# Patient Record
Sex: FEMALE | Race: WHITE
[De-identification: names, ages, dates, MRNs, and addresses within clinical notes are randomized per-mention and may not be internally consistent; named-entity substitution may affect disease eponyms.]

---

## 2018-06-18 LAB
BUN BLD-MCNC: 13 MG/DL (ref 6–20)
GLUCOSE SERPLBLD-MCNC: 112 MG/DL (ref 65–120)
HCT VFR BLD CALC: 34.1 % (ref 36–45)
LYMPHOCYTES # SPEC AUTO: 1.1 K/UL (ref 0.7–4.9)
MCH RBC QN AUTO: 29.6 PG (ref 27–35)
MCV RBC: 87.8 FL (ref 80–100)
PMV BLD: 7.5 FL (ref 7.6–11.3)
POTASSIUM SERPL-SCNC: 3.8 MEQ/L (ref 3.6–5)
RBC # BLD: 3.89 M/UL (ref 3.86–4.86)

## 2018-06-18 NOTE — EKG
Test Date:    2018-06-18               Test Time:    11:52:44

Technician:   KENAN                                     

                                                     

MEASUREMENT RESULTS:                                       

Intervals:                                           

Rate:         85                                     

NM:           140                                    

QRSD:         82                                     

QT:           386                                    

QTc:          459                                    

Axis:                                                

P:            -7                                     

NM:           140                                    

QRS:          99                                     

T:            49                                     

                                                     

INTERPRETIVE STATEMENTS:                                       

                                                     

Normal sinus rhythm

Rightward axis

Anterior infarct, age undetermined

Abnormal ECG

No previous ECG available for comparison



Electronically Signed On 06-18-18 12:06:56 CDT by Clement Mayo

## 2018-06-19 ENCOUNTER — HOSPITAL ENCOUNTER (OUTPATIENT)
Dept: HOSPITAL 97 - OR | Age: 83
Discharge: HOME | End: 2018-06-19
Attending: ORTHOPAEDIC SURGERY
Payer: COMMERCIAL

## 2018-06-19 VITALS — TEMPERATURE: 97.2 F | DIASTOLIC BLOOD PRESSURE: 57 MMHG | SYSTOLIC BLOOD PRESSURE: 133 MMHG

## 2018-06-19 VITALS — OXYGEN SATURATION: 100 %

## 2018-06-19 DIAGNOSIS — I10: ICD-10-CM

## 2018-06-19 DIAGNOSIS — J44.9: ICD-10-CM

## 2018-06-19 DIAGNOSIS — E07.9: ICD-10-CM

## 2018-06-19 DIAGNOSIS — Z87.891: ICD-10-CM

## 2018-06-19 DIAGNOSIS — Z88.6: ICD-10-CM

## 2018-06-19 DIAGNOSIS — I25.10: ICD-10-CM

## 2018-06-19 DIAGNOSIS — K21.9: ICD-10-CM

## 2018-06-19 DIAGNOSIS — S52.502A: Primary | ICD-10-CM

## 2018-06-19 DIAGNOSIS — Z85.118: ICD-10-CM

## 2018-06-19 DIAGNOSIS — J45.909: ICD-10-CM

## 2018-06-19 PROCEDURE — 85025 COMPLETE CBC W/AUTO DIFF WBC: CPT

## 2018-06-19 PROCEDURE — 80048 BASIC METABOLIC PNL TOTAL CA: CPT

## 2018-06-19 PROCEDURE — 0PSJ04Z REPOSITION LEFT RADIUS WITH INTERNAL FIXATION DEVICE, OPEN APPROACH: ICD-10-PCS

## 2018-06-19 PROCEDURE — 93005 ELECTROCARDIOGRAM TRACING: CPT

## 2018-06-19 PROCEDURE — 25607 OPTX DST RD XARTC FX/EPI SEP: CPT

## 2018-06-19 PROCEDURE — 36415 COLL VENOUS BLD VENIPUNCTURE: CPT

## 2018-06-19 PROCEDURE — 73100 X-RAY EXAM OF WRIST: CPT

## 2018-06-19 RX ADMIN — MORPHINE SULFATE ONE MG: 4 INJECTION, SOLUTION INTRAMUSCULAR; INTRAVENOUS at 09:18

## 2018-06-19 RX ADMIN — MORPHINE SULFATE ONE MG: 4 INJECTION, SOLUTION INTRAMUSCULAR; INTRAVENOUS at 09:31

## 2018-06-19 RX ADMIN — MORPHINE SULFATE ONE MG: 4 INJECTION, SOLUTION INTRAMUSCULAR; INTRAVENOUS at 09:38

## 2018-06-19 RX ADMIN — MORPHINE SULFATE ONE MG: 4 INJECTION, SOLUTION INTRAMUSCULAR; INTRAVENOUS at 09:24

## 2018-06-19 NOTE — RAD REPORT
EXAM DESCRIPTION:  RAD - Wrist Left 2 View - 6/19/2018 10:11 am

 

FINDINGS:  Left wrist fluoroscopy performed. Six images were submitted for evaluation. Total dose 2.8
2 mGy. Fluoro time 3.1 minutes

 

Multiple portable C-arm views were obtained during fluoroscopic assisted placement of fracture fixati
on hardware. No suspicious or unexpected findings.

## 2018-06-19 NOTE — P.BOP
Preoperative diagnosis: left distal radius fracture highly displaced


Postoperative diagnosis: same


Primary procedure: left distal radius orif


Estimated blood loss: 10 ccs


Anesthesia: General


Transferred to: Recovery Room


Condition: Good

## 2018-06-19 NOTE — OP
Date of Procedure:  06/19/2018



Surgeon:  Jose Raul Jay MD



Preoperative Diagnosis:  Left distal radius, which is comminuted and highly displaced and also 3 week
s old.



Postoperative Diagnosis:  Left distal radius, which is comminuted and highly displaced and also 3 wee
ks old.



Procedure:  Left distal radius open reduction and internal fixation using Acumed 2 volar plating syst
em.



Estimated Blood Loss:  10 cc.



Complications:  There were no complications.



Specimens:  No pathology specimen sent.



Indication For Operation:  Ms. Wagner is an 83-year-old female, who unfortunately has an ipsilateral p
roximal humerus fracture, which is nondisplaced, who presented to my office around 3 weeks after init
ially being injured.  She has been placed in a sugar-tong splint.  She had a reduction; however, x-ra
ys demonstrated the reduction did not hold it at all, her distal radius is completely dissociated fro
m her radial shaft.  All risks, benefits, and alternatives to operative intervention were discussed w
ith the patient and family.  They know that her results may not be ideal; however, goal will be to re
establish the hand onto the forearm.  They state they understand everything as presented and wished t
o proceed.



Description Of Procedure:  The patient was taken to the operating room and placed in supine position.
  General anesthesia was obtained by the staff.  Following this, she was gently positioned because of
 her shoulder and a well-padded tourniquet was placed on superior left arm.  Her left upper extremity
 was then prepped and draped in usual sterile fashion for the procedure.  C-arm was brought in and at
tempts were made to pursue any sort of closed reduction maneuver.  This does not cause any movement o
f the fracture fragment and any sort of a positive direction, it is completely dissociated essentiall
y banded and extremely short.  The appearance of the wrist itself is highly really deviated and short
.  We cannot establish neutral position of the wrist in this position.  Following this, the incision 
line was marked out for a volar approach of Nazario.  The arm was then elevated, but not exsanguinated.
  The tourniquet was raised.  A standard volar approach of Nazario was then taken down carefully throug
h skin and soft tissues.  Meticulous hemostasis was being maintained using bipolar electrocautery.  T
his leads to the flexor carpi radialis, which was retracted radialward.  The sheath of the flexor car
pi radialis was then retracted ulnarward along with the median nerve and flexor pollicis longus.  The
 proximal portion of the radius was then easily encountered.  It has been smoothed down with callus f
ormation.  The pronator quadratus was then removed from the shaft.  A combination of x-ray and carefu
l dissection was then used to establish the distal portion.  The distal portion was then brought up. 
 It still was in extreme radial deviation and with dorsal angulation, there was also radial translati
on.  Some dissection was made in the region of the radioulnar joint to help ulnarly translate this.  
Attempts were made to reduce this and hold in place while plating it; however, this was not going to 
be possible.  Therefore, the decision was made to get it as reduced as possible and then apply the di
stal portion of the plate first.  This was done in a non-usual way as the radius could not be reduced
 while applying the plate.  Following this and after all pegs were applied, the radius was then broug
ht out to length and ulnarly deviated and ulnarly only translated as much as possible.  There still d
oes appear to be some more ulnar translation that could be affected, but it is placed as well and red
uced as possible and the toggle screw is then placed to hold it.  After this has been rotated as much
 as possible on the probable screw, the other 2 screws were applied.  At the conclusion of the case, 
it should be stated that the plate does look a little bit radial.  The radius itself is translated a 
bit radially; however, I feel it is doing very well for the problems that we are faced with, but I th
ink this should give her a functional wrist in a much better result than she would have otherwise.  T
he wound was then irrigated and the skin was closed using interrupted nylon sutures.  The patient was
 then placed in a well-padded sterile 

dressing as well as a sugar-tong splint, awakened, and taken to recovery room in good condition.  The
re were no complications.





/CRISTAL

DD:  06/19/2018 09:14:59Voice ID:  [id number]

DT:  06/19/2018 16:48:20Report ID:  [id number]

## 2018-07-02 ENCOUNTER — HOSPITAL ENCOUNTER (INPATIENT)
Dept: HOSPITAL 97 - ER | Age: 83
LOS: 3 days | Discharge: NURSING, REHAB OR LONG-TERM CARE | DRG: 470 | End: 2018-07-05
Attending: FAMILY MEDICINE | Admitting: HOSPITALIST
Payer: COMMERCIAL

## 2018-07-02 VITALS — BODY MASS INDEX: 22.4 KG/M2

## 2018-07-02 DIAGNOSIS — Z92.21: ICD-10-CM

## 2018-07-02 DIAGNOSIS — Z90.2: ICD-10-CM

## 2018-07-02 DIAGNOSIS — S72.002A: Primary | ICD-10-CM
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
## 2018-07-02 DIAGNOSIS — Z85.118: ICD-10-CM

## 2018-07-02 DIAGNOSIS — Z88.5: ICD-10-CM

## 2018-07-02 DIAGNOSIS — I10: ICD-10-CM

## 2018-07-02 DIAGNOSIS — E03.9: ICD-10-CM

## 2018-07-02 DIAGNOSIS — Z87.891: ICD-10-CM

## 2018-07-02 DIAGNOSIS — J44.9: ICD-10-CM

## 2018-07-02 DIAGNOSIS — I48.2: ICD-10-CM

## 2018-07-02 DIAGNOSIS — E78.00: ICD-10-CM

## 2018-07-02 DIAGNOSIS — Z88.1: ICD-10-CM

## 2018-07-02 LAB
ALBUMIN SERPL BCP-MCNC: 2.5 G/DL (ref 3.4–5)
ALP SERPL-CCNC: 94 U/L (ref 45–117)
ALT SERPL W P-5'-P-CCNC: 14 U/L (ref 12–78)
AST SERPL W P-5'-P-CCNC: 20 U/L (ref 15–37)
BUN BLD-MCNC: 14 MG/DL (ref 7–18)
GLUCOSE SERPLBLD-MCNC: 90 MG/DL (ref 74–106)
HCT VFR BLD CALC: 31.8 % (ref 36–45)
LYMPHOCYTES # SPEC AUTO: 1.4 K/UL (ref 0.7–4.9)
MCH RBC QN AUTO: 29.3 PG (ref 27–35)
MCV RBC: 88.4 FL (ref 80–100)
PMV BLD: 7.4 FL (ref 7.6–11.3)
POTASSIUM SERPL-SCNC: 3.9 MMOL/L (ref 3.5–5.1)
RBC # BLD: 3.6 M/UL (ref 3.86–4.86)

## 2018-07-02 PROCEDURE — 85025 COMPLETE CBC W/AUTO DIFF WBC: CPT

## 2018-07-02 PROCEDURE — 99285 EMERGENCY DEPT VISIT HI MDM: CPT

## 2018-07-02 PROCEDURE — 36415 COLL VENOUS BLD VENIPUNCTURE: CPT

## 2018-07-02 PROCEDURE — 83735 ASSAY OF MAGNESIUM: CPT

## 2018-07-02 PROCEDURE — 80053 COMPREHEN METABOLIC PANEL: CPT

## 2018-07-02 PROCEDURE — 72192 CT PELVIS W/O DYE: CPT

## 2018-07-02 PROCEDURE — 81015 MICROSCOPIC EXAM OF URINE: CPT

## 2018-07-02 PROCEDURE — 93005 ELECTROCARDIOGRAM TRACING: CPT

## 2018-07-02 PROCEDURE — 81003 URINALYSIS AUTO W/O SCOPE: CPT

## 2018-07-02 PROCEDURE — 88305 TISSUE EXAM BY PATHOLOGIST: CPT

## 2018-07-02 PROCEDURE — 97163 PT EVAL HIGH COMPLEX 45 MIN: CPT

## 2018-07-02 PROCEDURE — 84100 ASSAY OF PHOSPHORUS: CPT

## 2018-07-02 PROCEDURE — 80048 BASIC METABOLIC PNL TOTAL CA: CPT

## 2018-07-02 PROCEDURE — 88311 DECALCIFY TISSUE: CPT

## 2018-07-02 PROCEDURE — 88304 TISSUE EXAM BY PATHOLOGIST: CPT

## 2018-07-02 NOTE — ER
Nurse's Notes                                                                                     

 [facility]                                                                

Name: Penny Wagner                                                                              

Age: 83 yrs                                                                                       

Sex: Female                                                                                       

: 1935                                                                                   

MRN: [id number]                                                                                   

Arrival Date: 2018                                                                          

Time: 19:30                                                                                       

Account#: [id number]                                                                            

Bed 17                                                                                            

Private MD:                                                                                       

Diagnosis: Displaced fracture of base of neck of left femur                                       

                                                                                                  

Presentation:                                                                                     

                                                                                             

19:35 Presenting complaint: Patient states: "I fell on  and I had to have surgery   bs1 

      on my left arm/wrist, my left hip has always been hurting me but I noticed it more the      

      past couple of days, [facility] took an X-ray of my hip and       

      found a hairline fracture to my left hip.".                                                 

19:35 Transition of care: [facility]. Onset of symptoms was . Risk Assessment: Do you want to hurt yourself or someone else? Patient            

      reports no desire to harm self or others. Initial Sepsis Screen: Does the patient meet      

      any 2 criteria? No. Patient's initial sepsis screen is negative. Does the patient have      

      a suspected source of infection? No. Patient's initial sepsis screen is negative. Care      

      prior to arrival: None.                                                                     

19:35 Method Of Arrival: Wheelchair                                                           bs1 

19:35 Acuity: LISA 3                                                                           bs1 

                                                                                                  

Historical:                                                                                       

- Allergies:                                                                                      

19:54 Codeine;                                                                                bs1 

19:54 Keflex;                                                                                 bs1 

- Home Meds:                                                                                      

19:54 amiodarone 200 mg Oral tab 1 tab once daily [Active]; bisoprolol fumarate 10 mg oral    bs1 

      tab 1 tab once daily [Active]; montelukast 10 mg oral tab 1 tab once daily [Active];        

      isosorbide mononitrate 30 mg Oral Tb24 1 tab once daily [Active]; levothyroxine 100 mcg     

      tab 1 tab once daily [Active]; doxepin 10 mg Oral cap 1 cap [Active]; sertraline 100 mg     

      oral tab 1 tab once daily [Active]; voltaren gel 1% daily top [Active]; Protonix 40 mg      

      Oral TbEC 1 tab once daily [Active]; Lidoderm 5 % Topical ptmd [Active]; nifedipine 30      

      mg Oral tr24 1 tab once daily [Active]; Norco  mg Oral tab 1 tab every 4-6 hours      

      [Active];                                                                                   

- PMHx:                                                                                           

20:04 High Cholesterol; Hypertension; COPD; Hypothyroidism; lung cancer;                      bs1 

- PSHx:                                                                                           

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 

                                                                                                  

- Immunization history:: Adult Immunizations up to date.                                          

- Social history:: Smoking status: Patient/guardian denies using tobacco.                         

- Ebola Screening: : Patient negative for fever greater than or equal to 101.5 degrees            

  Fahrenheit, and additional compatible Ebola Virus Disease symptoms Patient denies               

  exposure to infectious person.                                                                  

                                                                                                  

                                                                                                  

Screenin:05 Abuse screen: Denies threats or abuse. Denies injuries from another. Nutritional        bs1 

      screening: No deficits noted. Tuberculosis screening: No symptoms or risk factors           

      identified. Fall Risk None identified.                                                      

                                                                                                  

Assessment:                                                                                       

19:45 General: Appears in no apparent distress. uncomfortable, slender, Behavior is calm,     bs1 

      cooperative, appropriate for age. Pain: Complains of pain in left hip Pain does not         

      radiate. Neuro: Level of Consciousness is awake, alert, obeys commands, Oriented to         

      person, place, time, situation, Appropriate for age. Cardiovascular: Denies chest pain,     

      shortness of breath, Heart tones S1 S2 present Capillary refill < 3 seconds Patient's       

      skin is warm and dry. Respiratory: Airway is patent Trachea midline Respiratory effort      

      is even, unlabored, Breath sounds are clear bilaterally. GI: No signs and/or symptoms       

      were reported involving the gastrointestinal system. : No signs and/or symptoms were      

      reported regarding the genitourinary system. EENT: No signs and/or symptoms were            

      reported regarding the EENT system. Derm: Skin is intact, is fragile, Skin is pink,         

      warm \T\ dry. normal. Musculoskeletal: Circulation, motion, and sensation intact.           

      Capillary refill < 3 seconds, Range of motion: limited in left leg/hip Reports pain in      

      left hip neurovascular checks wnl bilaterally.                                              

21:00 Reassessment: Dr Hutton gave verbal order to give norco 5-325 po x1 if pain c/o pain.   bs1 

      Order received.                                                                             

22:00 Reassessment: Patient appears in no apparent distress at this time. No changes from     bs1 

      previously documented assessment. Patient is alert, oriented x 3, equal unlabored           

      respirations, skin warm/dry/pink.                                                           

23:00 Reassessment: Patient and/or family updated on plan of care and expected duration. Pain bs1 

      level reassessed. Patient is alert, oriented x 3, equal unlabored respirations, skin        

      warm/dry/pink. Pending room assignment.                                                     

23:45 Reassessment: Patient and/or family updated on plan of care and expected duration. Pain bs1 

      level reassessed. Patient is alert, oriented x 3, equal unlabored respirations, skin        

      warm/dry/pink.                                                                              

                                                                                                  

Vital Signs:                                                                                      

19:35  / 65; Pulse 63; Resp 16; Temp 98.1(O); Pulse Ox 93% on R/A; Weight 58.97 kg;     bs1 

      Height 5 ft. 3 in. (160.02 cm); Pain 8/10;                                                  

20:30  / 64; Pulse 64; Resp 16; Pulse Ox 94% on R/A; Pain 7/10;                         bs1 

21:30  / 61; Pulse 61; Resp 16 S; Pulse Ox 95% on R/A;                                  bs1 

22:30  / 67; Pulse 67; Resp 16; Pulse Ox 95% on R/A;                                    bs1 

23:20  / 78; Pulse 63; Resp 16; Temp 98(O); Pulse Ox 95% on R/A; Pain 5/10;             bs1 

19:35 Body Mass Index 23.03 (58.97 kg, 160.02 cm)                                             bs1 

                                                                                                  

ED Course:                                                                                        

19:30 Patient arrived in ED.                                                                  am2 

19:33 Jorge Luis Hutton MD is Attending Physician.                                            tw4 

19:33 Laura Marrero, RN is Primary Nurse.                                                 bs1 

19:48 Triage completed.                                                                       bs1 

20:05 Patient has correct armband on for positive identification. Bed in low position. Call   bs1 

      light in reach. Side rails up X 1. Pulse ox on. NIBP on.                                    

20:23 Patient moved to CT.                                                                    nj  

20:25 CT completed. Patient tolerated procedure well. Patient moved back from CT.             nj  

20:26 CT Pelvis wo Cont In Process Unspecified.                                               EDMS

20:34 Initial lab(s) drawn, by me, sent to lab. Inserted saline lock: 22 gauge in right       ks6 

      antecubital area, using aseptic technique. Blood collected.                                 

21:00 Arm band placed on right wrist.                                                         bs1 

21:42 Victor Manuel La MD is Hospitalizing Provider.                                           tw4 

23:54 No provider procedures requiring assistance completed. Patient admitted, IV remains in  bs1 

      place. intact.                                                                              

                                                                                                  

Administered Medications:                                                                         

22:55 Drug: Norco 5 mg-325 mg 1 tabs Route: PO;                                               bs1 

23:59 Follow up: Response: No adverse reaction                                                bs1 

                                                                                                  

                                                                                                  

Outcome:                                                                                          

21:44 Decision to Hospitalize by Provider.                                                    tw4 

23:55 Admitted to Tele accompanied by tech, via stretcher, room 410, with chart, Report       bs1 

      called to  BART Sesay                                                                        

23:55 Condition: stable                                                                           

23:55 Instructed on the need for admit, Demonstrated understanding of instructions.               

23:59 Patient left the ED.                                                                    bs1 

                                                                                                  

Signatures:                                                                                       

Dispatcher [employer]                           EDMS                                                 

Tomy Rosales Amanda                               amJessica Strong                            2                                                  

Laura Marrero RN                   RN   bs1                                                  

Jorge Luis Hutton MD MD   tw4                                                  

Bob Chatman                              ks6                                                  

                                                                                                  

Corrections: (The following items were deleted from the chart)                                    

20:03 20:01 Patient moved to [facility]                                                           2 

                                                                                                  

**************************************************************************************************

## 2018-07-02 NOTE — RAD REPORT
EXAM DESCRIPTION:  CT - Pelvis Wo Cont - 7/2/2018 8:26 pm

 

CLINICAL HISTORY:   Left hip pain status post fall several days ago.

 

COMPARISON:  None.

 

TECHNIQUE:  Computed axial tomography of the pelvis was obtained coronal reconstruction was performed


 

All CT scans are performed using dose optimization technique as appropriate and may include automated
 exposure control or mA/KV adjustment according to patient size.

 

FINDINGS:  An impacted mildly to moderately displaced fracture involves the the junction of the left 
femoral head/neck extending into the left femoral neck.

 

No dislocation is seen.

 

IMPRESSION:  An impacted mildly to moderately displaced fracture involves the the junction of the lef
t femoral head/neck extending into the left femoral neck.

## 2018-07-02 NOTE — EDPHYS
Physician Documentation                                                                           

 [facility]                                                                

Name: Penny Wagner                                                                              

Age: 83 yrs                                                                                       

Sex: Female                                                                                       

: 1935                                                                                   

MRN: [id number]                                                                                   

Arrival Date: 2018                                                                          

Time: 19:30                                                                                       

Account#: [id number]                                                                            

Bed 17                                                                                            

Private MD:                                                                                       

ED Physician Jorge Luis Hutton                                                                     

HPI:                                                                                              

                                                                                             

21:44 This 83 yrs old  Female presents to ER via Wheelchair with complaints of Hip   tw4 

      Pain.                                                                                       

21:44 The patient or guardian reports decreased range of motion, an injury, pain. that        tw4 

      occurred at an unknown site, sustained from a fall, The patient is not able to              

      ambulate. The patient is able to bear partial body weight. the patient was discovered       

      an unknown amount of time after the incident. The complaints affect the left upper          

      thigh. Onset: The symptoms/episode began/occurred at an unknown time. Modifying             

      factors: The symptoms are alleviated by nothing, the symptoms are aggravated by             

      nothing. Associated signs and symptoms: Loss of consciousness: the patient experienced      

      no loss of consciousness. The patient has not experienced similar symptoms in the past.     

                                                                                                  

Historical:                                                                                       

- Allergies:                                                                                      

19:54 Codeine;                                                                                bs1 

19:54 Keflex;                                                                                 bs1 

- Home Meds:                                                                                      

19:54 amiodarone 200 mg Oral tab 1 tab once daily [Active]; bisoprolol fumarate 10 mg oral    bs1 

      tab 1 tab once daily [Active]; montelukast 10 mg oral tab 1 tab once daily [Active];        

      isosorbide mononitrate 30 mg Oral Tb24 1 tab once daily [Active]; levothyroxine 100 mcg     

      tab 1 tab once daily [Active]; doxepin 10 mg Oral cap 1 cap [Active]; sertraline 100 mg     

      oral tab 1 tab once daily [Active]; voltaren gel 1% daily top [Active]; Protonix 40 mg      

      Oral TbEC 1 tab once daily [Active]; Lidoderm 5 % Topical ptmd [Active]; nifedipine 30      

      mg Oral tr24 1 tab once daily [Active]; Norco  mg Oral tab 1 tab every 4-6 hours      

      [Active];                                                                                   

- PMHx:                                                                                           

20:04 High Cholesterol; Hypertension; COPD; Hypothyroidism; lung cancer;                      bs1 

- PSHx:                                                                                           

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 

                                                                                                  

- Immunization history:: Adult Immunizations up to date.                                          

- Social history:: Smoking status: Patient/guardian denies using tobacco.                         

- Ebola Screening: : Patient negative for fever greater than or equal to 101.5 degrees            

  Fahrenheit, and additional compatible Ebola Virus Disease symptoms Patient denies               

  exposure to infectious person.                                                                  

                                                                                                  

                                                                                                  

ROS:                                                                                              

21:44 Constitutional: Negative for fever, chills, and weight loss, Cardiovascular: Negative   tw4 

      for chest pain, palpitations, and edema, Respiratory: Negative for shortness of breath,     

      cough, wheezing, and pleuritic chest pain, Abdomen/GI: Negative for abdominal pain,         

      nausea, vomiting, diarrhea, and constipation.                                               

21:44 Skin: Negative for injury, rash, and discoloration, Neuro: Negative for headache,           

      weakness, numbness, tingling, and seizure.                                                  

21:44 MS/extremity: Positive for injury or acute deformity, decreased range of motion,            

      deformity, tenderness, Negative for abrasion, bite, contusion, puncture, rash, swelling.    

                                                                                                  

Exam:                                                                                             

21:44 Constitutional:  This is a well developed, well nourished patient who is awake, alert,  tw4 

      and in no acute distress. Head/Face:  Normocephalic, atraumatic. Chest/axilla:  Normal      

      chest wall appearance and motion.  Nontender with no deformity.  No lesions are             

      appreciated. Cardiovascular:  Regular rate and rhythm with a normal S1 and S2.  No          

      gallops, murmurs, or rubs.  Normal PMI, no JVD.  No pulse deficits. Respiratory:  Lungs     

      have equal breath sounds bilaterally, clear to auscultation and percussion.  No rales,      

      rhonchi or wheezes noted.  No increased work of breathing, no retractions or nasal          

      flaring. Abdomen/GI:  Soft, non-tender, with normal bowel sounds.  No distension or         

      tympany.  No guarding or rebound.  No evidence of tenderness throughout.                    

21:44 Musculoskeletal/extremity: Extremities: noted in the left upper thigh: decreased ROM,       

      deformity, pain, ROM: limited active range of motion due to pain, in the left upper         

      thigh, limited passive range of motion due to pain, in the left upper thigh,                

      Circulation is intact in all extremities. Sensation intact. Compartment Syndrome exam       

      of affected extremity: is normal. Joints: the  left hip displays limited range of           

      motion, painful range of motion, swelling, tenderness, Weight bearing: is unable to         

      bear weight.                                                                                

                                                                                                  

Vital Signs:                                                                                      

19:35  / 65; Pulse 63; Resp 16; Temp 98.1(O); Pulse Ox 93% on R/A; Weight 58.97 kg;     bs1 

      Height 5 ft. 3 in. (160.02 cm); Pain 8/10;                                                  

20:30  / 64; Pulse 64; Resp 16; Pulse Ox 94% on R/A; Pain 7/10;                         bs1 

21:30  / 61; Pulse 61; Resp 16 S; Pulse Ox 95% on R/A;                                  bs1 

22:30  / 67; Pulse 67; Resp 16; Pulse Ox 95% on R/A;                                    bs1 

23:20  / 78; Pulse 63; Resp 16; Temp 98(O); Pulse Ox 95% on R/A; Pain 5/10;             bs1 

19:35 Body Mass Index 23.03 (58.97 kg, 160.02 cm)                                             bs1 

                                                                                                  

MDM:                                                                                              

19:43 Patient medically screened.                                                             tw4 

21:44 Differential diagnosis: hip fracture, intertrochanteric fracture, femoral neck          tw4 

      fracture, femoral shaft fracture. Data reviewed: vital signs, nurses notes. Counseling:     

      I had a detailed discussion with the patient and/or guardian regarding: the historical      

      points, exam findings, and any diagnostic results supporting the discharge/admit            

      diagnosis, lab results, radiology results. Physician consultation: Victor Manuel La MD was     

      called at 21:40, was contacted at 21:40, regarding admission, to the medical/surgical       

      unit. patient's condition, need to evaluate the patient as soon as possible, and will       

      see patient in inpatient room. Admission orders: after a detailed discussion of the         

      patient's condition and case, the admit orders are written by me. Other consultation:       

      Ortho Dr De Leontion \T\ 2030 will see pt in the am.                                           

                                                                                                  

                                                                                             

19:45 Order name: CBC with Diff                                                               tw4 

                                                                                             

19:45 Order name: CMP                                                                         tw4 

                                                                                             

19:45 Order name: CT Pelvis wo Cont; Complete Time: 20:55                                     tw4 

                                                                                             

20:56 Interpretation: Abnormal.                                                               tw4 

                                                                                             

19:46 Order name: CBC with Automated Diff; Complete Time: 20:55                               EDMS

                                                                                             

20:55 Interpretation: Normal except: RBC 3.60; HCT 31.8; HGB 10.6; RDW 17.3; MPV 7.4;         tw4 

      EOSINOPHIL % 6.5.                                                                           

                                                                                             

19:46 Order name: Comprehensive Metabolic Panel; Complete Time: 20:55                         EDMS

                                                                                             

20:56 Interpretation: Normal except: ; GFR 48; ALB 2.5; GLOB 3.9; A/G 0.6.              tw4 

                                                                                                  

Administered Medications:                                                                         

22:55 Drug: Norco 5 mg-325 mg 1 tabs Route: PO;                                               bs1 

23:59 Follow up: Response: No adverse reaction                                                bs1 

                                                                                                  

                                                                                                  

Disposition:                                                                                      

18 21:44 Hospitalization ordered by Victor Manuel La for Inpatient Admission. Preliminary     

  diagnosis is Displaced fracture of base of neck of left femur.                                  

- Bed requested for Telemetry/MedSurg (Inpatient).                                                

- Status is Inpatient Admission.                                                              bs1 

- Condition is Stable.                                                                            

- Problem is new.                                                                                 

- Symptoms are unchanged.                                                                         

UTI on Admission? No                                                                              

                                                                                                  

                                                                                                  

                                                                                                  

Signatures:                                                                                       

Dispatcher [employer]                           EDMS                                                 

Nicolasa Stanton mt, Brittany, RN RN   bs1                                                  

Jorge Luis Hutton MD MD   tw4                                                  

                                                                                                  

Corrections: (The following items were deleted from the chart)                                    

20:55 20:55 Normal except: ; GFR 48. tw4                                                tw4 

20:56 20:55 Normal except: ; GFR 48; ALB 2.5. tw4                                       tw4 

20:56 20:55 Normal except: ; GFR 48; ALB 2.5; GLOB 3.9. tw4                             tw4 

22:52 21:44 Hospitalization Ordered by Victor Manuel La MD for Inpatient Admission. Preliminary  mt  

      diagnosis is Displaced fracture of base of neck of left femur. Bed requested for            

      Telemetry/MedSurg (Inpatient). Status is Inpatient Admission. Condition is Stable.          

      Problem is new. Symptoms are unchanged. UTI on Admission? No. tw4                           

23:59 22:52 2018 21:44 Hospitalization Ordered by Victor Manuel La MD for Inpatient        bs1 

      Admission. Preliminary diagnosis is Displaced fracture of base of neck of left femur.       

      Bed requested for Telemetry/MedSurg (Inpatient). Status is Inpatient Admission.             

      Condition is Stable. Problem is new. Symptoms are unchanged. UTI on Admission? No. mt       

                                                                                                  

**************************************************************************************************

## 2018-07-03 LAB
ALBUMIN SERPL BCP-MCNC: 2.4 G/DL (ref 3.4–5)
ALP SERPL-CCNC: 86 U/L (ref 45–117)
ALT SERPL W P-5'-P-CCNC: 12 U/L (ref 12–78)
AST SERPL W P-5'-P-CCNC: 18 U/L (ref 15–37)
BUN BLD-MCNC: 10 MG/DL (ref 7–18)
GLUCOSE SERPLBLD-MCNC: 83 MG/DL (ref 74–106)
HCT VFR BLD CALC: 33.9 % (ref 36–45)
LYMPHOCYTES # SPEC AUTO: 1.6 K/UL (ref 0.7–4.9)
MAGNESIUM SERPL-MCNC: 1.8 MG/DL (ref 1.8–2.4)
MCH RBC QN AUTO: 29.4 PG (ref 27–35)
MCV RBC: 88.5 FL (ref 80–100)
PMV BLD: 7.6 FL (ref 7.6–11.3)
POTASSIUM SERPL-SCNC: 3.6 MMOL/L (ref 3.5–5.1)
RBC # BLD: 3.83 M/UL (ref 3.86–4.86)
UA COMPLETE W REFLEX CULTURE PNL UR: (no result)
UA DIPSTICK W REFLEX MICRO PNL UR: (no result)

## 2018-07-03 RX ADMIN — SERTRALINE HYDROCHLORIDE SCH MG: 100 TABLET ORAL at 09:16

## 2018-07-03 RX ADMIN — Medication SCH: at 09:00

## 2018-07-03 RX ADMIN — Medication SCH: at 20:19

## 2018-07-03 RX ADMIN — MORPHINE SULFATE PRN MG: 2 INJECTION, SOLUTION INTRAMUSCULAR; INTRAVENOUS at 20:16

## 2018-07-03 RX ADMIN — AMIODARONE HYDROCHLORIDE SCH MG: 200 TABLET ORAL at 09:16

## 2018-07-03 RX ADMIN — SODIUM CHLORIDE SCH MLS: 0.9 INJECTION, SOLUTION INTRAVENOUS at 12:50

## 2018-07-03 RX ADMIN — SODIUM CHLORIDE SCH MLS: 0.9 INJECTION, SOLUTION INTRAVENOUS at 00:26

## 2018-07-03 RX ADMIN — MORPHINE SULFATE PRN MG: 2 INJECTION, SOLUTION INTRAMUSCULAR; INTRAVENOUS at 09:26

## 2018-07-03 RX ADMIN — MORPHINE SULFATE PRN MG: 2 INJECTION, SOLUTION INTRAMUSCULAR; INTRAVENOUS at 13:18

## 2018-07-03 RX ADMIN — ACETAMINOPHEN PRN MG: 500 TABLET, FILM COATED ORAL at 16:20

## 2018-07-03 RX ADMIN — NIFEDIPINE SCH MG: 30 TABLET, FILM COATED, EXTENDED RELEASE ORAL at 20:17

## 2018-07-03 RX ADMIN — ACETAMINOPHEN PRN MG: 500 TABLET, FILM COATED ORAL at 01:35

## 2018-07-03 NOTE — P.PN
Subjective


Date of Service: 07/03/18


Chief Complaint: Left hip pain/left hip fracture


Pt seen and examined today. Chart reviewed. Case DW with ortho. Pt is scheduled 

for procedure elder AM. Yong john C/o overnight and this AM. 





Review of Systems


General: As per HPI





Physical Examination





- Vital Signs


Temperature: 98.5 F


Blood Pressure: 153/68


Pulse: 67


Respirations: 16


Pulse Ox (%): 93





- Physical Exam


General: Alert, In no apparent distress


HEENT: Atraumatic, PERRLA, EOMI


Neck: Supple, JVD not distended


Respiratory: Clear to auscultation bilaterally, Normal air movement


Cardiovascular: Regular rate/rhythm, Normal S1 S2


Gastrointestinal: Normal bowel sounds, No tenderness


Musculoskeletal: No tenderness, Other (left shoulder in the sling. Mild 

tenderness to touch on the hip area. )


Integumentary: No rashes


Neurological: Normal speech, Normal tone, Normal affect


Lymphatics: No axilla or inguinal lymphadenopathy





- Studies


Laboratory Data (last 24 hrs)





07/02/18 20:00: Sodium 134 L, Potassium 3.9, BUN 14, Creatinine 1.10, Glucose 90

, Total Bilirubin 0.2, AST 20, ALT 14, Alkaline Phosphatase 94


07/02/18 20:00: WBC 6.5, Hgb 10.6 L, Hct 31.8 L, Plt Count 358





Medications List Reviewed: Yes





Assessment & Plan





- Problems (Diagnosis)


(1) Fracture of femoral neck, left


Onset Date: 07/03/18   Current Visit: Yes   Status: Acute   


Plan: 


Displaced fracture of the femoral neck. 


-Ortho consulted. Reccs appreciated. 


-OR elder AM 


-IV fluids and pain medication for now. 





Qualifiers: 


   Encounter type: initial encounter   Fracture type: closed   Qualified Code(s)

: S72.002A - Fracture of unspecified part of neck of left femur, initial 

encounter for closed fracture   





(2) Atrial fibrillation


Current Visit: Yes   Status: Acute   


Plan: 


Chronic stable for now 





Qualifiers: 


   Atrial fibrillation type: chronic   Qualified Code(s): I48.2 - Chronic 

atrial fibrillation   


Discharge Plan: Nursing Home


Plan to discharge in: 48 Hours





- Code Status/Comfort Care


Code Status Assessed: Yes


Critical Care: No

## 2018-07-03 NOTE — EKG
Test Date:    2018-07-03               Test Time:    08:24:10

Technician:   ANANT                                     

                                                     

MEASUREMENT RESULTS:                                       

Intervals:                                           

Rate:         68                                     

NC:           152                                    

QRSD:         90                                     

QT:           438                                    

QTc:          465                                    

Axis:                                                

P:            6                                      

NC:           152                                    

QRS:          67                                     

T:            41                                     

                                                     

INTERPRETIVE STATEMENTS:                                       

                                                     

Sinus rhythm with premature atrial complexes

Minimal voltage criteria for LVH, may be normal variant

Anterior infarct, age undetermined

Abnormal ECG

Compared to ECG 06/18/2018 11:52:44

Atrial premature complex(es) now present

Left ventricular hypertrophy now present

Right-axis deviation no longer present

Myocardial infarct finding still present



Electronically Signed On 07-03-18 15:33:53 CDT by Frantz Trejo

## 2018-07-03 NOTE — RAD REPORT
EXAM DESCRIPTION:  RAD - Hip Left 2 View - 7/3/2018 11:26 am

 

CLINICAL HISTORY:  r/o fx

Trauma, fall

 

COMPARISON:  Pelvis Wo Cont dated 7/2/2018

 

FINDINGS:  Mildly impacted subcapital fracture of the proximal left femur is seen. No dislocation is 
seen.

## 2018-07-03 NOTE — RAD REPORT
EXAM DESCRIPTION:  RAD - Wrist Left 2 View - 7/3/2018 11:25 am

 

CLINICAL HISTORY:  r/o fx

Pain

 

COMPARISON:  Wrist Left 2 View dated 6/19/2018

 

FINDINGS:  Hardware plate with multiple screws is seen within the distal radius. Distal radial fractu
re lucency persists. The bone detail is limited by cast material.  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

## 2018-07-03 NOTE — RAD REPORT
EXAM DESCRIPTION:  RAD - Shoulder  Left 2 View - 7/3/2018 11:26 am

 

CLINICAL HISTORY:  r/o fx

Fall, trauma, pain

 

COMPARISON:  No comparisons

 

FINDINGS:  Comminuted and impacted fracture proximal left humerus is seen with callus formation evide
nt. AC joint and glenohumeral joint arthritic changes are noted. No dislocation is seen.

## 2018-07-03 NOTE — P.HP
Certification for Inpatient


Patient admitted to: Inpatient


With expected LOS: >2 Midnights


Patient will require the following post-hospital care: Rehabilitation


Practitioner: I am a practitioner with admitting privileges, knowledge of 

patient current condition, hospital course, and medical plan of care.


Services: Services provided to patient in accordance with Admission 

requirements found in Title 42 Section 412.3 of the Code of Federal Regulations





Patient History


Date of Service: 07/02/18


Reason for admission: Left hip pain/left hip fracture


History of Present Illness: 


Patient is an 83-year-old female who came to the hospital with pain in the left 

hip.  Patient's pain started over a week ago.  Patient had apparently fallen on 

June 22nd, and she required surgery on her left arm and left wrist.  Patient 

states she has been having pain in her left hip since falling.  She had been 

transferred to [facility] facility in [place], [place].  It was there were 

they took an x-ray of her left hip been found of hairline fracture.  The center 

out to our facility to get further evaluated.  In the emergency room, a CT scan 

revealed a femoral neck fracture of the left hip.  Orthopedic was notified and 

patient was admitted to the hospital for further evaluation.





Patient does cardiac and lung disease. Patient apparently had a lobectomy for 

lung cancer.  Also, patient is currently on amiodarone for atrial fibrillation.

  Patient's heart rate is controlled.  Patient denies any chest pain or recent 

shortness of breath episodes.  Patient has been per to spreading with therapy 

fairly well.  Patient has not been asking for any pain medication today.  

Patient is low risk for cardiopulmonary complications in the perioperative 

period.  However, benefits outweigh the risk.


Allergies





codeine Allergy (Unknown, Verified 07/03/18 01:08)


 Nausea/Vomiting


cephalexin [From Keflex] Allergy (Verified 07/03/18 01:08)


 Unknown





Home Medications: 








Amiodarone HCl [Cordarone Tab] 200 mg PO DAILY 06/18/18 


Bisoprolol Fumarate [Zebeta] 5 mg PO DAILY 06/18/18 


Doxepin HCl [Sinequan] 10 mg PO DAILY 06/18/18 


Isosorbide Mononitrate [Isosorbide Mononitrate ER] 30 mg PO DAILY 06/18/18 


Levothyroxine Sodium [Synthroid] 100 mcg PO DAILY 06/18/18 


Montelukast Sodium [Singulair] 10 mg PO DAILY 06/18/18 


Sertraline [Zoloft] 100 mg PO DAILY 06/18/18 


Diclofenac Sodium [Voltaren] 2 gm TOP QID 07/03/18 


Hydrocodone 10/APAP 325 [Norco 10/325*] 1 tab PO DAILY 07/03/18 


Lidocaine 5% Patch [Lidoderm 5% Patch*] 1 patch TD DAILY 07/03/18 


Nifedipine [Nifedipine ER] 30 mg PO BEDTIME 07/03/18 


Pantoprazole [Protonix Tab*] 40 mg PO DAILY 07/03/18 








- Past Medical/Surgical History


Has patient received pneumonia vaccine in the past: Yes


Diabetic: No


-: High Cholesterol


-: HTN


-: COPD


-: Hypothyroidism


-: Lung Cancer


-: Atrial fibrillation


-: Partial lung removed


-: Chemo


-: Hysterectomy


-: Appy


-: Left Wrist surgery





- Family History


  ** Father


Medical History: Heart disease





- Social History


Smoking Status: Former smoker


Alcohol use: No


CD- Drugs: No


Caffeine use: Yes


Place of Residence: Home





Review of Systems


10-point ROS is otherwise unremarkable





Physical Examination





- Vital Signs


Temperature: 98.2 F


Blood Pressure: 146/90


Pulse: 66


Respirations: 18


Pulse Ox (%): 93





- Physical Exam


General: Alert, In no apparent distress, Oriented x3


HEENT: Atraumatic, PERRLA, Mucous membr. moist/pink, EOMI, Sclerae nonicteric


Neck: Supple, 2+ carotid pulse no bruit, No LAD, Without JVD or thyroid 

abnormality


Respiratory: Clear to auscultation bilaterally, Normal air movement


Cardiovascular: Regular rate/rhythm, Normal S1 S2, No murmurs


Gastrointestinal: Normal bowel sounds, Soft and benign, Non-distended, No 

tenderness


Musculoskeletal: No clubbing, No swelling, No tenderness


Integumentary: No rashes


Neurological: Normal gait, Normal speech, Normal strength at 5/5 x4 extr, 

Normal tone, Sensation intact, Cranial nerves 3-12 intact, Normal affect


Lymphatics: No axilla or inguinal lymphadenopathy





- Studies


Laboratory Data (last 24 hrs)





07/02/18 20:00: Sodium 134 L, Potassium 3.9, BUN 14, Creatinine 1.10, Glucose 90

, Total Bilirubin 0.2, AST 20, ALT 14, Alkaline Phosphatase 94


07/02/18 20:00: WBC 6.5, Hgb 10.6 L, Hct 31.8 L, Plt Count 358








Assessment & Plan





- Problems (Diagnosis)


(1) Fracture of femoral neck, left


Onset Date: 07/03/18   Current Visit: Yes   Status: Acute   





(2) History of lobectomy of lung


Current Visit: Yes   Status: Acute   





(3) Atrial fibrillation


Current Visit: Yes   Status: Acute   





- Plan





Plan:


1. Repeat EKG


2. Orthopedic consultation


3. Pain control


4. Resume cardiac meds


5. GI and DVT prophylaxis


Discharge Plan: Home


Plan to discharge in: Greater than 2 days





- Advance Directives


Does patient have a Living Will: No


Does patient have a Durable POA for Healthcare: No





- Code Status/Comfort Care


Code Status Assessed: Yes


Code Status: Full Code


Critical Care: No


Time Spent Managing PTS Care (In Minutes): 50

## 2018-07-04 LAB
BUN BLD-MCNC: 6 MG/DL (ref 7–18)
GLUCOSE SERPLBLD-MCNC: 88 MG/DL (ref 74–106)
MAGNESIUM SERPL-MCNC: 1.9 MG/DL (ref 1.8–2.4)
POTASSIUM SERPL-SCNC: 3.7 MMOL/L (ref 3.5–5.1)

## 2018-07-04 PROCEDURE — 0SRS0JZ REPLACEMENT OF LEFT HIP JOINT, FEMORAL SURFACE WITH SYNTHETIC SUBSTITUTE, OPEN APPROACH: ICD-10-PCS

## 2018-07-04 RX ADMIN — SODIUM CHLORIDE SCH MLS: 0.9 INJECTION, SOLUTION INTRAVENOUS at 21:26

## 2018-07-04 RX ADMIN — SODIUM CHLORIDE SCH MLS: 9 INJECTION, SOLUTION INTRAVENOUS at 18:08

## 2018-07-04 RX ADMIN — NIFEDIPINE SCH MG: 30 TABLET, FILM COATED, EXTENDED RELEASE ORAL at 21:25

## 2018-07-04 RX ADMIN — MEPERIDINE HYDROCHLORIDE ONE MG: 25 INJECTION, SOLUTION INTRAMUSCULAR; INTRAVENOUS; SUBCUTANEOUS at 11:02

## 2018-07-04 RX ADMIN — SODIUM CHLORIDE SCH MLS: 0.9 INJECTION, SOLUTION INTRAVENOUS at 01:23

## 2018-07-04 RX ADMIN — DOXEPIN HYDROCHLORIDE SCH: 10 CAPSULE ORAL at 09:00

## 2018-07-04 RX ADMIN — SODIUM CHLORIDE SCH: 0.9 INJECTION, SOLUTION INTRAVENOUS at 15:00

## 2018-07-04 RX ADMIN — MEPERIDINE HYDROCHLORIDE ONE MG: 25 INJECTION, SOLUTION INTRAMUSCULAR; INTRAVENOUS; SUBCUTANEOUS at 11:40

## 2018-07-04 RX ADMIN — MEPERIDINE HYDROCHLORIDE ONE MG: 25 INJECTION, SOLUTION INTRAMUSCULAR; INTRAVENOUS; SUBCUTANEOUS at 11:07

## 2018-07-04 RX ADMIN — Medication SCH: at 09:00

## 2018-07-04 RX ADMIN — MORPHINE SULFATE ONE MG: 4 INJECTION, SOLUTION INTRAMUSCULAR; INTRAVENOUS at 11:20

## 2018-07-04 RX ADMIN — Medication SCH ML: at 21:25

## 2018-07-04 RX ADMIN — MONTELUKAST SODIUM SCH: 10 TABLET, FILM COATED ORAL at 09:00

## 2018-07-04 RX ADMIN — MORPHINE SULFATE ONE MG: 4 INJECTION, SOLUTION INTRAMUSCULAR; INTRAVENOUS at 11:15

## 2018-07-04 RX ADMIN — SERTRALINE HYDROCHLORIDE SCH: 100 TABLET ORAL at 09:00

## 2018-07-04 RX ADMIN — MORPHINE SULFATE ONE MG: 4 INJECTION, SOLUTION INTRAMUSCULAR; INTRAVENOUS at 11:30

## 2018-07-04 RX ADMIN — LEVOTHYROXINE SODIUM SCH: 0.1 TABLET ORAL at 05:37

## 2018-07-04 RX ADMIN — AMIODARONE HYDROCHLORIDE SCH: 200 TABLET ORAL at 09:00

## 2018-07-04 RX ADMIN — MEPERIDINE HYDROCHLORIDE ONE MG: 25 INJECTION, SOLUTION INTRAMUSCULAR; INTRAVENOUS; SUBCUTANEOUS at 11:46

## 2018-07-04 RX ADMIN — ISOSORBIDE MONONITRATE SCH: 30 TABLET, EXTENDED RELEASE ORAL at 09:00

## 2018-07-04 RX ADMIN — BISOPROLOL FUMARATE SCH: 5 TABLET, COATED ORAL at 09:00

## 2018-07-04 RX ADMIN — MORPHINE SULFATE ONE MG: 4 INJECTION, SOLUTION INTRAMUSCULAR; INTRAVENOUS at 11:35

## 2018-07-04 RX ADMIN — PANTOPRAZOLE SODIUM SCH: 40 TABLET, DELAYED RELEASE ORAL at 05:38

## 2018-07-04 NOTE — OP
Date of Procedure:  07/04/2018



Surgeon:  Jose Raul Jay MD



Preoperative Diagnosis:  Left femoral neck fracture with displacement.



Postoperative Diagnosis:  Left femoral neck fracture with displacement.



Procedure:  Left femoral neck bipolar hemiarthroplasty using Biomet Echo system.



Estimated Blood Loss:  100 cc.



Complications:  There were no complications.



Specimens:  No pathology specimens sent.



Indication For Operation:  Ms. Wagner is an 83-year-old, female who unfortunately fell some weeks ago 
injuring her left shoulder as well as her left wrist.  She has previously undergone an open reduction
 and internal fixation of her left wrist by me as well as closed treatment of the left shoulder.  Unf
ortunately, she started complaining of pain in a rehabilitation facility and because of this, her hip
 was again assessed and does demonstrate a displaced femoral neck fracture.  All risks, benefits, and
 alternatives have been discussed with the patient's family with regard to treatment of this.  They s
bonner they understand things as presented and wished to proceed.



Description Of Procedure:  The patient was taken to the operating room and placed in supine position.
  General anesthesia was obtained by staff.  Following this, she was then rolled right side down and 
properly positioned using hip positioners as well as axillary roll.  Following this, the left lower e
xtremity was then prepped and draped in usual sterile fashion for the procedure.  A standard posterol
ateral incision was then made with meticulous hemostasis being maintained using Bovie electrocautery.
  This was taken down carefully until fascia was encountered.  A small stab wound was made in the fas
lana.  The gluteal tendon was palpated to ensure we were in correct position.  This was then brought u
p to near the tip of the greater trochanter until it was then curved gently backward and the gluteus 
hleen muscle spread using finger pressure.  The sciatic nerve was palpated and protected.  It shoul
d be noted that the sciatic nerve appears to lie superficial to the external rotators, which is a sarah
iant; however, it was continually protected throughout the case.  The external rotators and capsule w
ere then taken down carefully as a unit and tagged for later repair.  The femoral neck itself was not
 healed and with movement of internal and external rotation, the femur was found to rock on the femor
al neck.  A standard neck cut was then performed.  The head was then sized using ring gauges.  The ca
nal was prepared using the  as well as the canal-finding reamer.  This was followed by cyli
ndrical reamers followed by broaching.  It was broached up to a size 11.  It was felt the size 9 woul
d be most appropriate.  There was a possibility that could have been a larger broach in; however, giv
en the patient's osteopenia, I felt that this would be most appropriate.  Following this, any soft ti
ssue was removed from the acetabulum and the canal was then irrigated until the irrigant runs clean a
nd is clear.  Following this, the bone plug was placed.  The canal was again irrigated until it was c
lear.  Two Ray-Tecs were placed in the acetabulum and third generation cementation technique were the
n used to place the Biomet Echo Fracture Stem.  This has been held in place in proper version until t
he cement hardened.  Once this was done, the hip was then trialed with an appropriate sized femoral b
all and +3 head.  She was able to come to extension.  She does have fairly wide hips also.  I think t
hat her hip has been shortened for quite sometime.  Therefore, decision was made to continue with the
 high offset stem and I believe the +3 was appropriate.  It is found to be stable to full hip flexion
, full adduction and internal rotation to near 45 degrees.  There is no squeaking.  The trial ball wa
s then removed.  The wound was again irrigated.  The final ball was then tapped into place.  It was t
hen relocated, again stable in all the above areas.  The wound was again irrigated and the external r
otators and capsule were repaired back to the greater trochanter via bone tunnels.  Following this, t
he fascia was closed in a watertight fashion using heavy Vicryl sutures.  It was again irrigated.  Th
e skin was closed using interrupted 2-0 Vicryl sutures followed by staples.  The patient was then aixa
taye supine where we will remove some sutures from her wrist, and she was placed into a 

wrist brace.  She is then awakened and taken to recovery room in good condition.  There were no compl
ications.





SE/MODL

DD:  07/04/2018 10:39:53Voice ID:  [id number]

DT:  07/04/2018 11:18:45Report ID:  [id number]

## 2018-07-04 NOTE — P.PN
Subjective


Date of Service: 07/04/18


Chief Complaint: Left hip pain/left hip fracture


Pt seen and examined today. Chart reviewed. Case DW with ortho. Currently No 

complains to offer. Getting ready to go to OR now. 





Review of Systems


General: As per HPI





Physical Examination





- Vital Signs


Temperature: 97.4 F


Blood Pressure: 112/42


Pulse: 81


Respirations: 18


Pulse Ox (%): 97





- Physical Exam


General: Alert, In no apparent distress


HEENT: Atraumatic, PERRLA, EOMI


Neck: Supple, JVD not distended


Respiratory: Clear to auscultation bilaterally, Normal air movement


Cardiovascular: Regular rate/rhythm, Normal S1 S2


Gastrointestinal: Normal bowel sounds, No tenderness


Musculoskeletal: Tenderness


Integumentary: No rashes


Neurological: Normal speech, Normal tone, Normal affect


Lymphatics: No axilla or inguinal lymphadenopathy





- Studies


Medications List Reviewed: Yes





Assessment & Plan





- Problems (Diagnosis)


(1) Fracture of femoral neck, left


Onset Date: 07/03/18   Current Visit: Yes   Status: Acute   


Plan: 


Displaced fracture of the femoral neck. 


-Ortho consulted. Reccs appreciated. 


-OR today for ORIF


-IV fluids and pain medication for now. 


-PT/OT consulted. 


Qualifiers: 


   Encounter type: initial encounter   Fracture type: closed   Qualified Code(s)

: S72.002A - Fracture of unspecified part of neck of left femur, initial 

encounter for closed fracture   





(2) Atrial fibrillation


Current Visit: Yes   Status: Acute   


Plan: 


Chronic stable for now 





Qualifiers: 


   Atrial fibrillation type: chronic   Qualified Code(s): I48.2 - Chronic 

atrial fibrillation   


Discharge Plan: Nursing Home


Plan to discharge in: 48 Hours





- Code Status/Comfort Care


Code Status Assessed: Yes


Critical Care: No

## 2018-07-04 NOTE — P.BOP
Preoperative diagnosis: left hip femoral neck fracture


Postoperative diagnosis: same


Primary procedure: left hip bipolar hemiarthoplasty


Estimated blood loss: 100ccs


Anesthesia: General


Complications: None


Transferred to: Recovery Room


Condition: Good

## 2018-07-04 NOTE — CON
Date of Consultation:  07/03/2018



I have seen Ms. Wagner in the past, treating her for her left wrist, which underwent open reduction an
d internal fixation approximately 2 weeks ago.  Also seeing her for the left shoulder, which was lita gardner nonoperatively for a proximal humerus fracture.  Unfortunately, while she was in Rehabilitation, 
she started complaining of hip pain.  She was sent to the emergency department, where x-rays were kaila
en, which demonstrated a displaced impacted left femoral neck fracture.  There does not appear to be 
a significant amount of surrounding hematoma and it is presumed that this is old.  This goes back to 
whenever I saw her in my office, where she had been treated elsewhere for a fall and treated in a spl
int for her wrist as well as a sling for her shoulder.  I discussed with her at that time whether or 
not she was having any hip pain, she says she really was not.  I discussed with the family if she was
 walking, they said that she was not, but probably because she just did not want to and it was diffic
ult for her, at no time did she really say that she had much hip pain until this occurs today.  Celso rodas said that they spoke with her and said that her arm and her hand were hurting so much that she real
ly did not mention her hip.  It should be noted that she has been walking on it, although not very we
ll since the time of her initial fall.  There is a possibility that she did injure it later; however,
 there is no evidence of this on the x-ray necessarily.  Otherwise, x-rays were taken of her wrist, w
ambroseh appears to be in the same position as it was at the time of operation and presumed interval heal
ing.  It is slightly less than completely anatomic, but much improved over a previous position than b
efore the operation.  With regard to her left shoulder, it appears that she has fairly abundant and n
umerous callus formation and it may even be radiographically united, it is now approaching 5 weeks af
ter the initial injury.  With regard to her left hip, CT scan and x-ray supports a displaced impacted
 femoral neck fracture on the left.  In speaking with her, she says that she really does not have kit
t much pain in her hip with range of motion; however, she has been consistently complaining of it whe
never she is trying to use it.  I discussed with her that her hip is broken and given her options, slyvia torres said that she would like whichever option she would do particularly the best with.  She is told kit
t this is difficult for me to determine.  She was given the option of further nonoperative management
, possibility of a screw fixation or bipolar hemiarthroplasty.  I spoke with her son as well, who hel
ps with her decision making and he again reiterates that she would do whatever I feel is best; tessa blandon, given his options, we will move forward with a bipolar hemiarthroplasty on the left.  He and the p
atient are both given all risks, benefits, and alternatives.  They state they understand things as pr
esented.  They know that the complications from this problem could be fairly significant, but agreed 
to proceed.  All of her questions were otherwise answered and we will plan on completing this most zulma fangy at 8 a.m. tomorrow and all other questions have been answered.





NATALIE

DD:  07/03/2018 18:11:45Voice ID:  [id number]

DT:  07/04/2018 03:12:01Report ID:  [id number]

## 2018-07-05 ENCOUNTER — HOSPITAL ENCOUNTER (INPATIENT)
Dept: HOSPITAL 97 - 5TH | Age: 83
LOS: 14 days | Discharge: NURSING, REHAB OR LONG-TERM CARE | DRG: 561 | End: 2018-07-19
Attending: PSYCHIATRY & NEUROLOGY | Admitting: PSYCHIATRY & NEUROLOGY
Payer: COMMERCIAL

## 2018-07-05 VITALS — OXYGEN SATURATION: 94 %

## 2018-07-05 VITALS — TEMPERATURE: 97.9 F | SYSTOLIC BLOOD PRESSURE: 107 MMHG | DIASTOLIC BLOOD PRESSURE: 51 MMHG

## 2018-07-05 DIAGNOSIS — I48.91: ICD-10-CM

## 2018-07-05 DIAGNOSIS — S72.302D: Primary | ICD-10-CM

## 2018-07-05 DIAGNOSIS — E03.9: ICD-10-CM

## 2018-07-05 DIAGNOSIS — I10: ICD-10-CM

## 2018-07-05 DIAGNOSIS — J44.9: ICD-10-CM

## 2018-07-05 DIAGNOSIS — Z23: ICD-10-CM

## 2018-07-05 DIAGNOSIS — E78.00: ICD-10-CM

## 2018-07-05 LAB
HCT VFR BLD CALC: 30.1 % (ref 36–45)
LYMPHOCYTES # SPEC AUTO: 1.3 K/UL (ref 0.7–4.9)
MCH RBC QN AUTO: 29.6 PG (ref 27–35)
MCV RBC: 90.3 FL (ref 80–100)
PMV BLD: 7.9 FL (ref 7.6–11.3)
RBC # BLD: 3.33 M/UL (ref 3.86–4.86)
UA COMPLETE W REFLEX CULTURE PNL UR: (no result)

## 2018-07-05 PROCEDURE — 87086 URINE CULTURE/COLONY COUNT: CPT

## 2018-07-05 PROCEDURE — 70450 CT HEAD/BRAIN W/O DYE: CPT

## 2018-07-05 PROCEDURE — 71250 CT THORAX DX C-: CPT

## 2018-07-05 PROCEDURE — 84134 ASSAY OF PREALBUMIN: CPT

## 2018-07-05 PROCEDURE — 80048 BASIC METABOLIC PNL TOTAL CA: CPT

## 2018-07-05 PROCEDURE — 87088 URINE BACTERIA CULTURE: CPT

## 2018-07-05 PROCEDURE — 82040 ASSAY OF SERUM ALBUMIN: CPT

## 2018-07-05 PROCEDURE — 83735 ASSAY OF MAGNESIUM: CPT

## 2018-07-05 PROCEDURE — 74176 CT ABD & PELVIS W/O CONTRAST: CPT

## 2018-07-05 PROCEDURE — 71110 X-RAY EXAM RIBS BIL 3 VIEWS: CPT

## 2018-07-05 PROCEDURE — 81001 URINALYSIS AUTO W/SCOPE: CPT

## 2018-07-05 PROCEDURE — 36415 COLL VENOUS BLD VENIPUNCTURE: CPT

## 2018-07-05 PROCEDURE — 87077 CULTURE AEROBIC IDENTIFY: CPT

## 2018-07-05 PROCEDURE — 87186 SC STD MICRODIL/AGAR DIL: CPT

## 2018-07-05 PROCEDURE — 85025 COMPLETE CBC W/AUTO DIFF WBC: CPT

## 2018-07-05 RX ADMIN — HYDROCODONE BITARTRATE AND ACETAMINOPHEN PRN TAB: 10; 325 TABLET ORAL at 21:34

## 2018-07-05 RX ADMIN — SERTRALINE HYDROCHLORIDE SCH MG: 100 TABLET ORAL at 10:26

## 2018-07-05 RX ADMIN — PANTOPRAZOLE SODIUM SCH MG: 40 TABLET, DELAYED RELEASE ORAL at 03:54

## 2018-07-05 RX ADMIN — SENNOSIDES AND DOCUSATE SODIUM PRN TAB: 8.6; 5 TABLET ORAL at 19:20

## 2018-07-05 RX ADMIN — ENOXAPARIN SODIUM SCH MG: 30 INJECTION SUBCUTANEOUS at 17:45

## 2018-07-05 RX ADMIN — Medication SCH ML: at 09:00

## 2018-07-05 RX ADMIN — LEVOTHYROXINE SODIUM SCH MG: 0.1 TABLET ORAL at 03:53

## 2018-07-05 RX ADMIN — BISOPROLOL FUMARATE SCH MG: 5 TABLET, COATED ORAL at 10:26

## 2018-07-05 RX ADMIN — DOXEPIN HYDROCHLORIDE SCH MG: 10 CAPSULE ORAL at 10:27

## 2018-07-05 RX ADMIN — MONTELUKAST SODIUM SCH MG: 10 TABLET, FILM COATED ORAL at 10:27

## 2018-07-05 RX ADMIN — Medication SCH ML: at 19:20

## 2018-07-05 RX ADMIN — AMIODARONE HYDROCHLORIDE SCH MG: 200 TABLET ORAL at 10:26

## 2018-07-05 RX ADMIN — SODIUM CHLORIDE SCH MLS: 9 INJECTION, SOLUTION INTRAVENOUS at 01:53

## 2018-07-05 RX ADMIN — ISOSORBIDE MONONITRATE SCH MG: 30 TABLET, EXTENDED RELEASE ORAL at 10:27

## 2018-07-05 NOTE — P.DS
Admission Date: 07/02/18


Discharge Date: 07/05/18


Disposition: TRANSFER TO INPATIENT REHAB


Discharge Condition: GOOD


Reason for Admission: Left hip pain/left hip fracture


Consultations: 





Ortho





Procedures: 





ORIF of the Hip Fracture on the left side 








- Problems


(1) Fracture of femoral neck, left


Onset Date: 07/03/18   Current Visit: Yes   Status: Acute   


Qualifiers: 


   Encounter type: initial encounter   Fracture type: closed   Qualified Code(s)

: S72.002A - Fracture of unspecified part of neck of left femur, initial 

encounter for closed fracture   





(2) Atrial fibrillation


Current Visit: Yes   Status: Acute   


Qualifiers: 


   Atrial fibrillation type: chronic   Qualified Code(s): I48.2 - Chronic 

atrial fibrillation   


Brief History of Present Illness: 





Patient is an 83-year-old female who came to the hospital with pain in the left 

hip.  Patient's pain started over a week ago.  Patient had apparently fallen on 

June 22nd, and she required surgery on her left arm and left wrist.  Patient 

states she has been having pain in her left hip since falling.  She had been 

transferred to [facility] facility in [place], [place].  It was there were 

they took an x-ray of her left hip been found of hairline fracture.  The center 

out to our facility to get further evaluated.  In the emergency room, a CT scan 

revealed a femoral neck fracture of the left hip.  Orthopedic was notified and 

patient was admitted to the hospital for further evaluation.





Patient does cardiac and lung disease. Patient apparently had a lobectomy for 

lung cancer.  Also, patient is currently on amiodarone for atrial fibrillation.

  Patient's heart rate is controlled.  Patient denies any chest pain or recent 

shortness of breath episodes.  Patient has been per to spreading with therapy 

fairly well.  Patient has not been asking for any pain medication today.  

Patient is low risk for cardiopulmonary complications in the perioperative 

period.  However, benefits outweigh the risk.


Hospital Course: 





Overall during the hospital stay patient remained stable





The patient was initially admitted to the hospital for left femoral neck 

fracture.  Ortho was consulted.  Patient was taken to the OR for open reduction 

internal fixation.  Patient had no complication during surgery and did well 

postprocedure as well.  Patient had physical therapy consulted.  Patient did 

well and ambulated around 75 feet with physical therapy.  Patient was referred 

for inpatient rehab and thus was transferred to inpatient rehab for further 

care.  Patient was to start taking the Lovenox from tomorrow morning and 

continue on Protonix b.i.d. for now.  No other complications noted while here 

in the hospital.


Vital Signs/Physical Exam: 














Temp Pulse Resp BP Pulse Ox


 


 97.8 F   75   16   122/53 L  91 


 


 07/05/18 08:00  07/05/18 08:00  07/05/18 08:00  07/05/18 08:00  07/05/18 08:00








General: Alert, In no apparent distress


HEENT: Atraumatic, PERRLA, EOMI


Neck: Supple, JVD not distended


Respiratory: Clear to auscultation bilaterally, Normal air movement


Cardiovascular: Regular rate/rhythm, Normal S1 S2


Gastrointestinal: Normal bowel sounds, No tenderness


Musculoskeletal: No tenderness


Integumentary: No rashes


Neurological: Normal speech, Normal tone, Normal affect


Lymphatics: No axilla or inguinal lymphadenopathy


Laboratory Data at Discharge: 














WBC  9.4 K/uL (4.3-10.9)  D 07/05/18  04:36    


 


Hgb  9.9 g/dL (12.0-15.0)  L  07/05/18  04:36    


 


Hct  30.1 % (36.0-45.0)  L  07/05/18  04:36    


 


Plt Count  300 K/uL (152-406)   07/05/18  04:36    


 


Sodium  138 mmol/L (136-145)   07/04/18  04:06    


 


Potassium  3.7 mmol/L (3.5-5.1)   07/04/18  04:06    


 


BUN  6 mg/dL (7-18)  L  07/04/18  04:06    


 


Creatinine  0.80 mg/dL (0.55-1.3)   07/04/18  04:06    


 


Glucose  88 mg/dL ()   07/04/18  04:06    


 


Phosphorus  3.2 mg/dL (2.5-4.9)   07/03/18  03:47    


 


Magnesium  1.9 mg/dL (1.8-2.4)   07/04/18  04:06    


 


Total Bilirubin  0.2 mg/dL (0.2-1.0)   07/03/18  03:47    


 


AST  18 U/L (15-37)   07/03/18  03:47    


 


ALT  12 U/L (12-78)   07/03/18  03:47    


 


Alkaline Phosphatase  86 U/L ()   07/03/18  03:47    








Home Medications: 








Amiodarone HCl [Cordarone*] 200 mg PO DAILY 06/18/18 


Bisoprolol Fumarate [Zebeta*] 5 mg PO DAILY 06/18/18 


Doxepin HCl [Sinequan*] 10 mg PO DAILY 06/18/18 


Isosorbide Mononitrate [Isosorbide Mononitrate ER] 30 mg PO DAILY 06/18/18 


Levothyroxine Sodium [Synthroid] 100 mcg PO DAILY 06/18/18 


Montelukast Sodium [Singulair] 10 mg PO DAILY 06/18/18 


Sertraline [Zoloft*] 100 mg PO DAILY 06/18/18 


Diclofenac Sodium [Voltaren] 2 gm TOP QID 07/03/18 


Hydrocodone 10/APAP 325 [Norco 10/325*] 1 tab PO DAILY 07/03/18 


Lidocaine 5% Patch [Lidoderm 5% Patch*] 1 patch TD DAILY 07/03/18 


Nifedipine [Nifedipine ER] 30 mg PO BEDTIME 07/03/18 


Pantoprazole [Protonix Tab*] 40 mg PO DAILY 07/03/18 


Enoxaparin Sodium [Lovenox 30 MG INJ] 30 mg SQ DAILY #14 syr 07/05/18 


traMADol HCL [Ultram*] 50 mg PO Q8H PRN #24 tab 07/05/18 





New Medications: 


Enoxaparin Sodium [Lovenox 30 MG INJ] 30 mg SQ DAILY #14 syr


traMADol HCL [Ultram*] 50 mg PO Q8H PRN #24 tab


 PRN Reason: Pain


Patient Discharge Instructions: Please f.u with PCP in 1 to 2 week post 

discharge.  New medication.  Protonix 40mg BID.  Start lovenox 30mg Daily from 7 /6/18. CHeck Hgb q3d


Diet: Regular


Activity: Ad eva


Followup: 


Jose Raul Jay MD [ACTIVE - CAN ADMIT] - 1 Week (call to schedule 

appointment)

## 2018-07-05 NOTE — R.PREADM
SCREENING DATE AND TIME



2018 11:41 (CDT) 



ANTICIPATED REHAB ADMISSION DATE



2018 



REFERRING FACILITY



[facility] 



REFERRAL DATE AND TIME



2018 11:41 (CDT) 



REFERRAL ROOM#



229 



ACUTE ADMIT DATE



2018 





Previous Rehabilitation(s):





No.



REFERRING PHYSICIAN



Tona Gill 



REHAB FACILITY



[facility] 



CLINICAL LIAISON



Loretta Talamantes 



PHYSICIAN REVIEWER



Dr. Moris Gonzalez M.D. 



MR#



[id number] 



[facility]



[id number] 



NAME



ESPERANZA CONLEY 



ADDRESS



[address] [facility] 



PHONE



[phone] 



[facility]



[id number] 



DATE OF BIRTH



1935 



AGE



83 



SSN#



 



GENDER



female 



MARITAL STATUS



 



RACE



white 



ADMIT FROM



01 - Home (private home/apt. board/care, assisted living, group home, transitional living) 



PRE-HOSPITAL LIVING SETTING



02 - [facility] 



PRE-HOSPITAL LIVING WITH



Family/Relatives 



FAMILY SUPPORT



Yes 



PRIMARY FAMILY CONTACT NAME



Karen Hanley 



PRIMARY FAMILY CONTACT PHONE



[phone] 



PRIMARY FAMILY CONTACT RELATIONSHIP



Son 



PHONE PRIMARY FAMILY CONTACT ON ADM.?



no 



IS PRIMARY FAMILY CONTACT AUTH. REP.?



no 



1ST EMERGENCY CONTACT



Karen Hanley 



1ST CONTACT PHONE



[phone] 



1ST CONTACT RELATIONSHIP



Son 



PHONE 1ST CONTACT ON ADM.



no 



IS 1ST CONTACT AUTH. REP.?



no 



PHONE 2ND CONTACT ON ADM.?



no 



PATIENT EMPLOYMENT STATUS



Retired (for age) 



PATIENT EMPLOYER



No Employer 



PAYOR INFORMATION:



1ST PAYOR NAME



[employer] 



1ST PAYOR PHONE



[phone] 



1ST PAYOR POLICY ID



[id number] 



INJURY/ILLNESS DUE TO ACCIDENT?



No 



ANOTHER PARTY RESPONSIBLE?



No 



PRIMARY REHAB/ACUTE DIAGNOSIS:



Left Femoral Neck Fracture



ONSET DATE



2018



REHAB IMPAIRMENT CATEGORY (DAVID):



07 Fracture of LE (FracLE) does NOT meet 60% rule



AFFECTED EXTREMITIES:



LLE



PRIMARY DIAGNOSIS-RELATED SURGERIES:



Left Femoral Neck Bipolar Hemiarthropplasty  on 2018



COMORBID REHAB/ACUTE DIAGNOSES:



- N/A

HTN

COPD

HIGH CHOLESTEROL

LUNG CANCER

ATRIAL FIBRILLATION

HYPOTHYROIDISM



INTERVENTIONS:



- COPD

02 sats

Medications

Nebulizers

Oxygen

Resp. therapy

X-rays

- Atrial Fibrillation

Anticoagulation

Medications

VS



RISK FOR COMPLICATIONS:



- COPD

Acute Resp failure

Pneumonia

Resp. Arrest

- Atrial Fibrillation

CVA

Heart failure

Limb embolus



SUMMARY OF ACUTE HOSPITALIZATION:



Pt. is a 82 yo Right-handed white female.

Her impairment category is Orthopaedic Disorders 08 -  Femur (Shaft) Fracture (08.2).

Pre-morbidly, Pt. was independent/mod-I in Social Cognition, Self-Care, Locomotion, Sphincter Control
, Transfers Control, and Communication; and she had good Sphincter Control.

Currently, she has deficits of Balance, Self-Care, Locomotion, Endurance, Safety Awareness, and Trans
fers Control.

Pt. is now referred to [facility] for acute in-patient rehabilitation in order
 to maximize patient's functional independence in activities of daily living, strength, ROM, and mobi
lity.

Patient has realistic goal of being discharged at assistance level 6-Brian to reside at Home with  Fam
russell/Relatives.



PAST MEDICAL HISTORY



ATRIAL FIBRILLATION

COPD

HIGH CHOLESTEROL

HTN

HYPOTHYROIDISM

LUNG CANCER



PAST SURGICAL HISTORY:



Partial lung removal

Hysterectomy

Left wrist surgery



MEDICATION ALLERGIES:



CODEINE

CEPHALEXIN



ENVIRONMENTAL ALLERGIES:





None Known

- Substance Allergies

None Known

- Other Allergies

None Known



CODE STATUS:



Full code



WEIGHT/HEIGHT/BMI:



WEIGHT



126 lbs 



HEIGHT



5' 3" 



BMI



22.3 



DIET:



- Diet Type

Regular

- Diet - Solid Texture

Regular

- Diet - Liquid Texture

Regular

- Tube Feed

N/A



SKIN DIAGRAM:







Incision on Left upper leg; extent - small; stage - NS(Not Stageable). Treatment - Per Physician's Or
ders.



REVIEW OF SYSTEMS:



- Gen

Alert and awake

Lying in bed

No apparent distress

Oriented to: person, time, and place

- Vital Signs

Temperature: 97.8 F

SBP/DBP: 122/53

Pulse: 75

Resp: 16

Vital signs stable, afebrile

- CVS

RRR



VITAL SIGNS



Temperature: 97.8 F

SBP/DBP: 122/53

Pulse: 75

Resp: 16

Vital signs stable, afebrile



CURRENT SPHINCTER CONTROL:



Pre-hospital bladder status: continent

# of bladder accidents in the last 7 days prior to screenin

Pre-hospital bowel status: continent

# of bowel accidents in the last 7 days prior to screenin

Last Bowel Movement Date: 2018



DETAILED CURRENT FUNCTIONAL STATUS:



- Bladder

accident frequency: Ind - No accidents in the past 7 days

- Bowel

accident frequency: Ind - No accidents in the past 7 days

- Walking

score based on distance walked: 0(N/A)

- Wheelchair

score based on distance traveled: 0(N/A)



FUNCTIONAL STATUS:



- Self-Care

A. Eating  Ind  sup   

B. Grooming  Ind  sup   

C. Bathing  Ind  sup   

D. Dressing - Upper   Ind  Ben   

E. Dressing - Lower   Ind  Ben   

F. Toileting  Ind  Ben   

- Sphincter Control

G: Bladder control   Ind  Ind   

H: Bowel control   Ind  Ind   

- Transfers Control

I. Bed/Chair/Wheelchair   Ind  maxA   

J. Toilet  Ind  maxA   

K. Tub/Shower  Ind  ADNO   

- Locomotion

L. Walk/Wheelchair (C)   Ind  maxA   

L. Walk/Wheelchair (W)   Ind  maxA   

M. Stairs  Ind  ADNO   

- Communication

N. Comprehension (B)   Ind  Ind   

O. Expression (B)   Ind  Ind   

- Social Cognition

P. Social Interaction  Ind  Ind   

Q. Problem Solving  Ind  Ind   

R. Memory  Ind  Ind   

- Endurance

Fair

- Balance

Fair

- Safety Awareness

Fair



CURRENT FUNC. DEFICITS:



Balance, Self-Care, Locomotion, Endurance, Safety Awareness, and Transfers Control



THERAPY NOTES FROM ACUTE CARE:



Attached.



SPECIAL NEEDS:



- Safety Concerns

Skin breakdown precautions needed due to skin breakdown risk



PRECAUTIONS:



- Weight Bearing Precaution

WBAT left LE

NWB Left UE

- Fall Precaution

Bed and chair alarm



PATIENT NEEDS ACTIVE AND ONGOING THERAPEUTIC INTERVENTION OF MULTIPLE THERAPY DISCIPLINES, INCLUDING:




- Occupational Therapy

Evaluate and Treat. 



- Physical Therapy

Evaluate and Treat. 



PATIENT NEEDS CLOSE MEDICAL SUPERVISION BY A REHABILITATION PHYSICIAN FOR:



Bowel and Bladder Management

Coordination of Treatment Team

Medical and Co-Morbidity Management

Wound Care

DVT Management

Pain Management

Post-Op Complications



PATIENT REQUIRES 24X7 REHAB NURSING FOR MEDICAL AND FUNCTIONAL MGT. OF THE FOLLOWING DEFICITS:



ADL's

Ambulation

Bowel and Bladder Management

Communication

Disease Management

Medication Management

Patient/Family Education

Providing Safe Environment

Skin Integrity

Transfers

Pain Management



PATIENT REQUIRES INTENSIVE, COORDINATED INTERDISCIPLINARY APPROACH TO REHAB:



Arranging Home Equipment/Services

Discharge Planning

Family Intervention/Training

/Case Management



PATIENT REHAB POTENTIAL:



Expected level of measurable improvement will be of a practical value to patient's functional capacit
y or adaptations to impairments

Has a viable Discharge Plan

Medically appropriate; condition is sufficiently stable to participate in intensive rehab program

Patient is able and expected to receive 3 hours of individualized therapy daily on at least 5 of ever
y 7 days

Patient's prognosis for significant practical improvement within a reasonable period of time appears 
Good



DISCHARGE PLAN:



- Estimated Length of Stay (days)

14. 



- Consensus on plan

Discharge plan has been discussed with primary caregiver. Patient/Family is in agreement with the aixa
n. Primary caregiver is in agreement with the plan. 



- Patient/Family Goals

Return home with assistance. 



- Planned Living Setting Upon Discharge

Home, to live with Family/Relatives. 



RECOMMENDED CARE LEVEL:



IRF



RECOMMENDATION DETAILS:



Recommended Admission to Comprehensive Rehabilitation Program to Increase Functional [place]



SCREENER'S COMPLETENESS CONFIRMATION:



- Screening Confirmation

The patient data collection on this preadmission screening form is finished



PHYSICIANS REVIEW AND ADMISSION DETERMINATION



Admit - Based on my review of the Pre-Admission Screening results, in my medical judgment and experie
nce, I concur with the findings and recommend admission to [facility], as this
 patient requires an IRF level of care.



SIGNATURE PANEL:



Clinical Liaison - [electronically] signed by Loretta Talamantes on 2018 at 12:05 (CDT)

Physician Reviewer - [electronically] signed by Dr. Moris Gonzalez M.D. on 2018 at 13:42 (CDT
)

## 2018-07-06 LAB
ALBUMIN SERPL BCP-MCNC: 2.2 G/DL (ref 3.4–5)
BUN BLD-MCNC: 11 MG/DL (ref 7–18)
GLUCOSE SERPLBLD-MCNC: 88 MG/DL (ref 74–106)
HCT VFR BLD CALC: 29.6 % (ref 36–45)
LYMPHOCYTES # SPEC AUTO: 1.8 K/UL (ref 0.7–4.9)
MAGNESIUM SERPL-MCNC: 1.8 MG/DL (ref 1.8–2.4)
MCH RBC QN AUTO: 29.6 PG (ref 27–35)
MCV RBC: 89.5 FL (ref 80–100)
PMV BLD: 7.3 FL (ref 7.6–11.3)
POTASSIUM SERPL-SCNC: 4.2 MMOL/L (ref 3.5–5.1)
PREALB SERPL-MCNC: 14.8 MG/DL (ref 20–40)
RBC # BLD: 3.31 M/UL (ref 3.86–4.86)

## 2018-07-06 RX ADMIN — NIFEDIPINE SCH MG: 30 TABLET, FILM COATED, EXTENDED RELEASE ORAL at 20:36

## 2018-07-06 RX ADMIN — SERTRALINE HYDROCHLORIDE SCH MG: 100 TABLET ORAL at 08:28

## 2018-07-06 RX ADMIN — HYDROCODONE BITARTRATE AND ACETAMINOPHEN PRN TAB: 10; 325 TABLET ORAL at 06:44

## 2018-07-06 RX ADMIN — Medication SCH TAB: at 08:28

## 2018-07-06 RX ADMIN — GABAPENTIN SCH MG: 300 CAPSULE ORAL at 13:05

## 2018-07-06 RX ADMIN — ISOSORBIDE MONONITRATE SCH MG: 30 TABLET, EXTENDED RELEASE ORAL at 08:28

## 2018-07-06 RX ADMIN — PANTOPRAZOLE SODIUM SCH MG: 40 TABLET, DELAYED RELEASE ORAL at 08:29

## 2018-07-06 RX ADMIN — AMIODARONE HYDROCHLORIDE SCH MG: 200 TABLET ORAL at 08:29

## 2018-07-06 RX ADMIN — Medication SCH: at 20:00

## 2018-07-06 RX ADMIN — MELATONIN PRN MG: 3 TAB ORAL at 20:40

## 2018-07-06 RX ADMIN — BISOPROLOL FUMARATE SCH MG: 5 TABLET, COATED ORAL at 08:29

## 2018-07-06 RX ADMIN — Medication SCH PATCH: at 08:28

## 2018-07-06 RX ADMIN — GABAPENTIN SCH MG: 300 CAPSULE ORAL at 20:35

## 2018-07-06 RX ADMIN — DOXEPIN HYDROCHLORIDE SCH MG: 10 CAPSULE ORAL at 08:29

## 2018-07-06 RX ADMIN — ENOXAPARIN SODIUM SCH MG: 30 INJECTION SUBCUTANEOUS at 16:10

## 2018-07-06 RX ADMIN — MONTELUKAST SODIUM SCH MG: 10 TABLET, FILM COATED ORAL at 08:29

## 2018-07-06 RX ADMIN — Medication SCH ML: at 08:30

## 2018-07-06 RX ADMIN — IRON SUPPLEMENT SCH MG: 325 TABLET ORAL at 08:28

## 2018-07-06 RX ADMIN — HYDROCODONE BITARTRATE AND ACETAMINOPHEN PRN TAB: 10; 325 TABLET ORAL at 12:16

## 2018-07-06 RX ADMIN — LEVOTHYROXINE SODIUM SCH MG: 0.1 TABLET ORAL at 05:06

## 2018-07-06 NOTE — PAPE
PATIENT:



[facility] 



MR#



[id number] 



ACCT#



[id number] 



REFERRING DOCTOR



kristyn Gill



EVALUATION DATE AND TIME



07/06/2018 16:25 (CDT) 



NAME



ESPERANZA CONLEY 



DATE OF BIRTH



1935 



AGE



83 



PHONE



[phone] 



N#



 



GENDER



female 



EVALUATING PHYSICIAN



Dr. Moris Gonzalez M.D. 



ADMISSION DIAGNOSIS:



Left Femoral Neck Fracture



ONSET DATE



07/02/2018



SECONDARY/COMORBID DIAGNOSES TIERED:



- N/A

HTN

COPD

HIGH CHOLESTEROL

LUNG CANCER

ATRIAL FIBRILLATION

HYPOTHYROIDISM



POST-ADMISSION FUNCTIONAL/MEDICAL STATUS:



- Bladder

Same accident frequency: Ind - No accidents in the past 7 days

- Bowel

Same accident frequency: Ind - No accidents in the past 7 days

- Walking

Same score based on distance walked: 0(N/A)

- Wheelchair

Same score based on distance traveled: 0(N/A)



STATUS CHANGE EVALUATION:



No change in Functional or Medical Status is identified compared with Pre-Admission screening.



PATIENT NEEDS CLOSE MEDICAL SUPERVISION BY A REHABILITATION PHYSICIAN FOR:



Bowel and Bladder Management

Coordination of Treatment Team

Medical and Co-Morbidity Management

Wound Care

DVT Management

Pain Management

Post-Op Complications



PATIENT REQUIRES 24X7 REHAB NURSING FOR MEDICAL AND FUNCTIONAL MGT. OF THE FOLLOWING DEFICITS:



ADL's

Ambulation

Bowel and Bladder Management

Communication

Disease Management

Medication Management

Patient/Family Education

Providing Safe Environment

Skin Integrity

Transfers

Pain Management



PATIENT REQUIRES INTENSIVE, COORDINATED INTERDISCIPLINARY APPROACH TO REHAB:



Arranging Home Equipment/Services

Discharge Planning

Family Intervention/Training

/Case Management



LIST OF IDENTIFIED AND POTENTIAL PROBLEMS:



Alteration in leisure activities

Bladder, Incontinence

Bowel, Incontinence

Infection, Actual or Potential

Mobility Impaired

Pain, Alteration in Comfort

Self Care Deficit

Skin Integrity, Actual or Potential

Urinary Tract Infection (UTI), Actual or Potential



RISK FOR COMPLICATIONS



- COPD

Acute Resp failure. Pneumonia. Resp. Arrest. 



- Atrial Fibrillation

CVA. Heart failure. Limb embolus. 



INTERVENTIONS



- COPD

02 sats. Medications. Nebulizers. Oxygen. Resp. therapy. X-rays. 



- Atrial Fibrillation

Anticoagulation. Medications. VS. 



PATIENT COULD BE AT RISK FOR COMPLICATIONS FROM ADVERSE MEDICAL CONDITIONS DUE TO HIS/HER COMORBIDITI
ES AND THE RIGORS OF THE INTENSIVE REHABILLITATION PROGRAM. METHODS OR INTERVENTIONS TO AVOID COMPLIC
ATIONS INCLUDE:



- Deep Vein Thrombosis (DVT)

Prophylaxis therapy for prevention _________________________. Sequential Compression Device (SCD). TE
D Hose. 



- Bleeding

Assess lab values and manage abnormalities. Nursing to teach precautions for anti-coagulation therapy
. Wound to be assessed every shift. 



- Infection

Clinical staff to assess and manage the signs and symptoms of infection including fever, redness, war
mth, etc. 



- Urinary Tract Infection





- Falls

Patient will be evaluated for Fall Precautions and will be placed on Fall Precautions as indicated pe
r protocol. 



- Skin Breakdown

Nursing will assess skin daily using assessment tool and will place on Skin Breakdown Precautions as 
indicated per protocol. 



- Pain

Clinical staff may employ non-medication methods such as massage, distraction, decrease stimulus, etc
. as needed. Clinical staff will assess patient's pain level every shift per protocol to assess and e
nsure pain management effectiveness. Medications will be given and the pain level re-assessed. 



PRELIMINARY PLAN OF CARE:



- Physical Therapy

Patient needs Physical Therapy for a daily minimum of 1.5 hours at least 5 out of 7 days, to improve:
 Mobility, Strengthening, Transfers, Stretching, ROM, Endurance, Ability to manage stairs, Gait, and 
Balance. 



- Rehabilitation Nursing

Patient requires 24x7 Rehabilitation Nursing for: Pain Issues, Identifying and preventing risk factor
s, Monitoring and reporting current medical conditions, Assisting with ambulation and transfer, Zane
ting with all ADL-s, Teaching patients about disease process and medications, Family teaching, Provid
ing safe environment, Bowel and Bladder Issues, Skin Integrity, and Medication Management. 





Patient needs  and/or Case Management for: Discharge Planning, Arranging Home Equipmen
t or Services, and Family Interventions. 



- Dietary and Nutrition Services

Patient needs Dietary and Nutrition Services for: Adequate Nutrition, Nutritional Supplements, and Nu
tritional Education. 



- Occupational Therapy

Patient needs Occupational Therapy for a daily minimum of 1.5 hours at least 5 out of 7 days, to impr
ove Activities of Daily Living, including: Eating, Grooming, Bathing, Dressing, Toileting, Toilet Tra
nsfers, Community Reintegration, Higher functional activities, Adaptive Equipment, Splinting, Househo
ld Tasks, and Other activities as determined. 



POTENTIAL FUNCTIONAL GOALS FOR PATIENT TO ACHIEVE BY DISCHARGE:



- Safety Precaution

Patient will remain free from falls or injury at time of discharge. 



- Bed Mobility

Patient will perform bed mobility at 4-Ben level of assistance. 



- Transfers

Patient will complete transfers from bed to chair at 4-Ben level of assistance. 



- Mobility

Patient will ambulate 150 ft with 4-Ben level of assistance with RW. 



PATIENT REHAB POTENTIAL



Expected level of measurable improvement will be of a practical value to patient's functional capacit
y or adaptations to impairments

Has a viable Discharge Plan

Medically appropriate; condition is sufficiently stable to participate in intensive rehab program

Patient is able and expected to receive 3 hours of individualized therapy daily on at least 5 of ever
y 7 days

Patient's prognosis for significant practical improvement within a reasonable period of time appears 
Good



DISCHARGE PLAN:



- Estimated Length of Stay (days)

14. 



- Consensus on plan

Discharge plan has been discussed with primary caregiver. Patient/Family is in agreement with the aixa
n. Primary caregiver is in agreement with the plan. 



- Patient/Family Goals

Return home with assistance. 



- Planned Living Setting Upon Discharge

Home, to live with Family/Relatives. 



CONCLUSION ON REHABILITATION NECESSITY:



I have evaluated patient's pre-admission functional status and, comparing it to the patient's post-ad
mission functional status now, I conclude that the pre-admission assessment was accurate. Patient's c
ondition on admission supports the medical necessity of admission to IRF. It is safe to proceed with 
patient's therapy program.



SIGNATURE PANEL:



Electronically signed by Dr. Moris Gonzalez M.D. on 07/06/2018 at 16:26 (CDT)

## 2018-07-06 NOTE — P.RH.PN
Estimated Length of Stay: 14


Expected Discharge Date: 07/18/18


Discharge Disposition Plan: Home


Family Support: Yes


Long Term Goal: Mobility, Transfers, Self Care


Vital Signs: 


 Last Vital Signs











Temp  97.4 F   07/06/18 07:00


 


Pulse  77   07/06/18 08:29


 


Resp  16   07/06/18 07:00


 


BP  149/64 H  07/06/18 08:29


 


Pulse Ox  95   07/06/18 07:00











Laboratory: 


 Laboratory Last Values











WBC  7.0 K/uL (4.3-10.9)  D 07/06/18  05:35    


 


RBC  3.31 M/uL (3.86-4.86)  L  07/06/18  05:35    


 


Hgb  9.8 g/dL (12.0-15.0)  L  07/06/18  05:35    


 


Hct  29.6 % (36.0-45.0)  L  07/06/18  05:35    


 


MCV  89.5 fL ()   07/06/18  05:35    


 


MCH  29.6 pg (27.0-35.0)   07/06/18  05:35    


 


MCHC  33.0 g/dL (32.0-36.0)   07/06/18  05:35    


 


RDW  17.3 % (12.1-15.2)  H  07/06/18  05:35    


 


Plt Count  302 K/uL (152-406)   07/06/18  05:35    


 


MPV  7.3 fL (7.6-11.3)  L  07/06/18  05:35    


 


Neutrophils %  58.2 % (41.7-73.7)   07/06/18  05:35    


 


Lymphocytes %  25.8 % (15.3-44.8)   07/06/18  05:35    


 


Monocytes %  12.0 % (3.3-12.3)   07/06/18  05:35    


 


Eosinophils %  3.1 % (0-4.4)   07/06/18  05:35    


 


Basophils %  0.9 % (0-1.3)   07/06/18  05:35    


 


Absolute Neutrophils  4.1 K/uL (1.8-8.0)   07/06/18  05:35    


 


Absolute Lymphocytes  1.8 K/uL (0.7-4.9)   07/06/18  05:35    


 


Absolute Monocytes  0.8 K/uL (0.1-1.3)   07/06/18  05:35    


 


Absolute Eosinophils  0.2 K/uL (0-0.5)   07/06/18  05:35    


 


Absolute Basophils  0.1 K/uL (0-0.5)   07/06/18  05:35    


 


Sodium  138 mmol/L (136-145)   07/06/18  05:35    


 


Potassium  4.2 mmol/L (3.5-5.1)   07/06/18  05:35    


 


Chloride  107 mmol/L ()   07/06/18  05:35    


 


Carbon Dioxide  26 mmol/L (21-32)   07/06/18  05:35    


 


BUN  11 mg/dL (7-18)   07/06/18  05:35    


 


Creatinine  0.90 mg/dL (0.55-1.3)   07/06/18  05:35    


 


Estimated GFR  60 mL/min (=/>90)  L  07/06/18  05:35    


 


Glucose  88 mg/dL ()   07/06/18  05:35    


 


Calcium  8.2 mg/dL (8.5-10.1)  L  07/06/18  05:35    


 


Magnesium  1.8 mg/dL (1.8-2.4)   07/06/18  05:35    


 


Albumin  2.2 g/dL (3.4-5.0)  L  07/06/18  05:35    


 


Prealbumin  14.8 mg/dL (20-40)  L  07/06/18  05:35    


 


Urine Color  Yellow   07/05/18  21:03    


 


Urine Appearance  Clear   07/05/18  21:03    


 


Urine pH  6.5  (5.0-7.0)   07/05/18  21:03    


 


Ur Specific Gravity  <=1.005  (1.005-1.030)   07/05/18  21:03    


 


Urine Ketones  Negative  (NEG)   07/05/18  21:03    


 


Urine Blood  Negative  (NEG)   07/05/18  21:03    


 


Urine Nitrite  Negative  (NEG)   07/05/18  21:03    


 


Urine Bilirubin  Negative  (NEG)   07/05/18  21:03    


 


Urine Urobilinogen  0.2 mg/dL (0.2-1.0)   07/05/18  21:03    


 


Ur Leukocyte Esterase  Negative  (NEG)   07/05/18  21:03    


 


Urine RBC  <5 /HPF (NONE SEEN)   07/05/18  21:03    


 


Urine WBC  <5 /HPF (<5)   07/05/18  21:03    


 


Ur Squamous Epith Cells  <5 /HPF (NONE SEEN)   07/05/18  21:03    


 


Urine Bacteria  <20 /HPF (<20)   07/05/18  21:03    


 


Urine Culture Reflexed  Not needed   07/05/18  21:03    


 


Urine Glucose  Negative  (NEG)   07/05/18  21:03    


 


Urine Total Protein  Negative  (NEG)   07/05/18  21:03    











Weight: 119 lb 6.4 oz


Closed Surgical Incision Present: Yes


Negative Pressure Wound Therapy Present: No


Physician Update: She has severe pain at the fracture sites. Will increase 

Norco and Tramadol frequency and add Gabapentin 300 mg twice daily. She is 

making fair progress overall. She has a mildly low Hgb and prealbumin.  


Summary: Patient's care plan and long term goals have been reviewed and revised 

as necessary. Please see the Rehabilitation Signature page for all necessary 

signatures.

## 2018-07-06 NOTE — FAST
ENCOUNTER DATE AND TIME:



07/06/2018 08:00 (CDT)



NAME



ESPERANZA CONLEY



YOB: 1935



DATE OF ADMISSION:



07/05/2018 15:57 (CDT)



PHONE:



[phone]



AGE:



83



N#







GENDER:



Female



ENCOUNTER PHYSICIAN:



Dr. Moris Gonzalez M.D.



ADMISSION DIAGNOSIS:



- Orthopaedic Disorders 08 -  Femur (Shaft) Fracture (08.2)

Left Femoral Neck Fracture. 



EATING:



Activity did not occur on this shift



EATING - SCORE:



0-UNK  



GROOMING:



Activity did not occur on this shift



GROOMING - SCORE:



0-UNK  



BATHING:



Activity did not occur on this shift



BATHING - SCORE:



0-UNK  



DRESSING - UPPER BODY:



Activity did not occur on this shift

Patient is not dressing in public clothing



ARTICLES SCORE



Total number of steps: 0



DRESSING - UPPER BODY - SCORE:



0-UNK  



DRESSING - LOWER BODY:



Activity did not occur on this shift

Patient is not dressing in public clothing



ARTICLES SCORE



Total number of steps: 0



DRESSING - LOWER BODY - SCORE:



0-UNK  



TOILETING:



Activity did not occur on this shift



TOILETING - SCORE:



0-UNK  



BLADDER MANAGEMENT:



Activity did not occur on this shift



BLADDER MANAGEMENT - SCORE:



7-IND  



BOWEL MANAGEMENT:



Activity did not occur on this shift



BOWEL MANAGEMENT - SCORE:



7-IND  



TRANSFERS: BED, CHAIR, WHEELCHAIR:



TRANSFERS: BED, CHAIR, WHEELCHAIR - STEP 1:



Does the patient require assistance with bed, chair, or wheelchair transfers? Yes.



TRANSFERS: BED, CHAIR, WHEELCHAIR - STEP 2:



Does the patient require the assistance of a helper? Yes.



TRANSFERS: BED, CHAIR, WHEELCHAIR - STEP 3:



How much assistance does the patient require from the helper? Lifting of the patient



TRANSFERS: BED, CHAIR, WHEELCHAIR - STEP 4:



Does the helper lift the patient ONLY up? ONLY down? Up AND Down? Up AND Down.



TRANSFERS: BED, CHAIR, WHEELCHAIR - SCORE:



2-MAX  



TRANSFERS: TOILET:



TRANSFERS: TOILET - STEP 1:



Does the patient require assistance with toilet transfers? Yes.



TRANSFERS: TOILET - STEP 2:



Does the patient require the assistance of a helper? Yes.



TRANSFERS: TOILET - STEP 3:



How much assistance does the patient require from the helper? Patient performs less than half of the 
transferring tasks



TRANSFERS: TOILET - STEP 4:



Does the patient require total assistance for the toilet transfer such as the helper doing basically 
all the lifting? No.



TRANSFERS: TOILET - SCORE:



2-MAX  



TRANSFERS: SHOWER:



Activity did not occur on this shift



TRANSFERS: SHOWER - SCORE:



0-UNK  



TRANSFERS: TUB:



Activity did not occur on this shift



TRANSFERS: TUB - SCORE:



0-UNK  



LOCOMOTION: WALK:



Patient requires the assistance of more than one helper

Patient walks less than 50 feet



LOCOMOTION: WALK - SCORE:



1-DEP  



LOCOMOTION: WHEELCHAIR:



Patient propels wheelchair less than 50 ft



LOCOMOTION: WHEELCHAIR - SCORE:



1-DEP  



LOCOMOTION: STAIRS:



Activity did not occur on this shift



LOCOMOTION: STAIRS - SCORE:



0-UNK  



COMPREHENSION:



COMPREHENSION - SCORE:



0-UNK  



EXPRESSION



EXPRESSION - SCORE:



0-UNK  



SOCIAL INTERACTION:



SOCIAL INTERACTION - SCORE:



0-UNK  



PROBLEM SOLVING:



PROBLEM SOLVING - SCORE:



0-UNK  



MEMORY:



MEMORY - SCORE:



0-UNK  



SIGNATURE PANEL:



The following modified sections: Transfers: Bed, Chair, Wheelchair - Score, Transfers: Toilet - Score
, Locomotion: Walk - Score, Locomotion: Wheelchair - Score, Locomotion: Stairs - Score were [electron
ically] signed by Adriano Taylor PT on Fri Jul 06 2018 15:41:03 T-0500 (Central Daylight Time)

## 2018-07-06 NOTE — FAST
SHIFT START DATE/TIME:



07/05/2018 19:00 (CDT)



SHIFT END DATE/TIME:



07/06/2018 07:00 (CDT)



NAME



ESPERANZA CONLEY



YOB: 1935



DATE OF ADMISSION:



07/05/2018 15:57 (CDT)



PHONE:



[phone]



AGE:



83



N#







GENDER:



Female



ENCOUNTER PHYSICIAN:



Dr. Mrois Gonzalez M.D.



ADMISSION DIAGNOSIS:



- Orthopaedic Disorders 08 -  Femur (Shaft) Fracture (08.2)

Left Femoral Neck Fracture. 



EATING:



Activity did not occur on this shift



EATING - SCORE:



0-UNK  



GROOMING:



Activity did not occur on this shift



GROOMING - SCORE:



0-UNK  



BATHING:



Activity did not occur on this shift



BATHING - SCORE:



0-UNK  



DRESSING - UPPER BODY:



Activity did not occur on this shift



ARTICLES SCORE



Total number of steps: 0



DRESSING - UPPER BODY - SCORE:



0-UNK  



DRESSING - LOWER BODY:



Activity did not occur on this shift



ARTICLES SCORE



Total number of steps: 0



DRESSING - LOWER BODY - SCORE:



0-UNK  



TOILETING:



TOILETING - STEP 1:



Does the patient require assistance with toileting? Yes.



TOILETING - STEP 2:



Does the patient require the assistance of a helper? Yes.



TOILETING - STEP 3:



How much assistance does the patient require from the helper? Hands-on assistance from the helper



TOILETING - STEP 4:



Of the 3 tasks: 1) Adjusting clothing prior to use, 2) Cleansing of perineal area,  3) Adjusting clot
mary after use; How many tasks does the patient perform WITHOUT assistance of the helper? No tasks; h
elper performs all three tasks



TOILETING - SCORE:



1-DEP  



BLADDER MANAGEMENT:



Patient depends entirely on [place] when using bedpan [helper rolls patient onto side / side-lying pos
ition; positions bedpan; assists patient to roll onto bedpan; holds bedpan in place; assists patient 
off bedpan].



BLADDER MANAGEMENT - SCORE:



1-DEP  



BOWEL MANAGEMENT:



Activity did not occur on this shift



BOWEL MANAGEMENT - SCORE:



7-IND  



TRANSFERS: BED, CHAIR, WHEELCHAIR:



Patient requires more than one helper and/or the use of a mechanical lift is utilized



TRANSFERS: BED, CHAIR, WHEELCHAIR - SCORE:



1-DEP  



TRANSFERS: TOILET:



Patient requires more than one helper and/or the use of a mechanical lift is utilized



TRANSFERS: TOILET - SCORE:



1-DEP  



TRANSFERS: SHOWER:



Activity did not occur on this shift



TRANSFERS: SHOWER - SCORE:



0-UNK  



TRANSFERS: TUB:



Activity did not occur on this shift



TRANSFERS: TUB - SCORE:



0-UNK  



LOCOMOTION: WALK:



Activity did not occur on this shift



LOCOMOTION: WALK - SCORE:



0-UNK  



LOCOMOTION: WHEELCHAIR:



Activity did not occur on this shift



LOCOMOTION: WHEELCHAIR - SCORE:



0-UNK  



COMPREHENSION:



COMPREHENSION - STEP 1:



Does the patient require help to understand complex and abstract ideas (such as current events, finan
nora, discharge planning, medical issues, relationships, etc)? No.



COMPREHENSION - STEP 2:



Does the patient need extra time, require an assistive device (such as glasses, hearing aids, or an a
ugmentative communication system), OR does s/he have mild difficulty expressing complex and abstract 
ideas (including mild dysarthria or mild word-finding problems)? Yes.



COMPREHENSION - SCORE:



6-ANUJA  



EXPRESSION



EXPRESSION - STEP 1:



Does the patient require help expressing complex and abstract ideas (such as current events, finances
, discharge planning, medical issues, relationships, etc)? No.



EXPRESSION - STEP 2:



Does the patient need extra time, require an assistive device (such as augmentive communication syste
m or a communication board), OR does s/he have mild difficulty expressing complex and abstract ideas 
(including mild dysarthria or mild word-find problems)? Yes.



EXPRESSION - SCORE:



6-ANUJA  



SOCIAL INTERACTION:



SOCIAL INTERACTION - STEP 1:



Does the patient require a helper to interact with others in social and therapeutic situations? No.



SOCIAL INTERACTION - STEP 2:



Does the patient need extra time in social situations, OR does s/he interact with staff, other patien
ts, and family members ONLY in structured environments, OR does s/he require medication for social in
teraction? Yes, patient needs extra time



SOCIAL INTERACTION - SCORE:



6-ANUJA  



PROBLEM SOLVING:



PROBLEM SOLVING - STEP 1:



Does the patient need help to solve complex problems such as managing a checking account or confronti
ng interpersonal problems? No.



PROBLEM SOLVING - STEP 2:



Does the patient require extra time to make decisions or solve problems, OR does s/he have slight dif
ficulty reading, initiating, or self-correcting in unfamiliar situations? Yes, patient needs extra ti
me.



PROBLEM SOLVING - SCORE:



6-ANUJA  



MEMORY:



MEMORY - STEP 1:



Does the patient need help to remember frequently encountered people, daily routines, and executing r
equests? Yes.



MEMORY - STEP 2:



How often does the patient need help to remember frequently encountered people, daily routines, and e
xecuting requests? 10% - 24% of the time



MEMORY - SCORE:



4-MIN  



SIGNATURE PANEL:



The following modified sections: Eating - Score, Grooming - Score, Bathing - Score, Dressing - Upper 
Body - Score, Dressing - Lower Body - Score, Toileting - Score, Bladder Management - Score, Bowel Man
agement - Score, Transfers: Bed, Chair, Wheelchair - Score, Transfers: Toilet - Score, Transfers: Fabi
wer - Score, Transfers: Tub - Score, Locomotion: Walk - Score, Locomotion: Wheelchair - Score, Compre
hension - Score, Expression - Score, Social Interaction - Score, Problem Solving - Score, Memory - Sc
ore were [electronically] signed by Celia Obrien RN on Fri Jul 06 2018 01:32:07 T-0500 (Central Tina
ght Time)

## 2018-07-06 NOTE — FAST
SHIFT START DATE/TIME:



07/06/2018 07:00 (CDT)



SHIFT END DATE/TIME:



07/06/2018 19:00 (CDT)



NAME



ESPERANZA CONLEY



YOB: 1935



DATE OF ADMISSION:



07/05/2018 15:57 (CDT)



PHONE:



[phone]



AGE:



83



N#







GENDER:



Female



ENCOUNTER PHYSICIAN:



Dr. Moris Gonzalez M.D.



ADMISSION DIAGNOSIS:



- Orthopaedic Disorders 08 -  Femur (Shaft) Fracture (08.2)

Left Femoral Neck Fracture. 



EATING:



EATING - STEP 1:



Does the patient require assistance when eating? No.



EATING - SCORE:



7-IND  



GROOMING:



Activity did not occur on this shift



GROOMING - SCORE:



0-UNK  



BATHING:



Activity did not occur on this shift



BATHING - SCORE:



0-UNK  



DRESSING - UPPER BODY:



Activity did not occur on this shift



ARTICLES SCORE



Total number of steps: 0



DRESSING - UPPER BODY - SCORE:



0-UNK  



DRESSING - LOWER BODY:



Activity did not occur on this shift



ARTICLES SCORE



Total number of steps: 0



DRESSING - LOWER BODY - SCORE:



0-UNK  



TOILETING:



TOILETING - STEP 1:



Does the patient require assistance with toileting? Yes.



TOILETING - STEP 2:



Does the patient require the assistance of a helper? Yes.



TOILETING - STEP 3:



How much assistance does the patient require from the helper? Hands-on assistance from the helper



TOILETING - STEP 4:



Of the 3 tasks: 1) Adjusting clothing prior to use, 2) Cleansing of perineal area,  3) Adjusting clot
mary after use; How many tasks does the patient perform WITHOUT assistance of the helper? One task



TOILETING - SCORE:



2-MAX  



BLADDER MANAGEMENT:



Patient depends entirely on [place] when using bedpan [helper rolls patient onto side / side-lying pos
ition; positions bedpan; assists patient to roll onto bedpan; holds bedpan in place; assists patient 
off bedpan].



BLADDER MANAGEMENT - SCORE:



1-DEP  



BLADDER MANAGEMENT - FREQUENCY OF ACCIDENTS:



BLADDER MANAGEMENT(FA) - STEP 1:



How many accidents has the patient had during the current shift? 0



BOWEL MANAGEMENT:



BOWEL MANAGEMENT - STEP 1:



Does the patient control bowels completely and intentionally without equipment devices or medications
 AND is always continent? Yes.



BOWEL MANAGEMENT - SCORE:



7-IND  



BOWEL MANAGEMENT - FREQUENCY OF ACCIDENTS:



BOWEL MANAGEMENT(FA) - STEP 1:



How many accidents has the patient had during the current shift? 0



TRANSFERS: BED, CHAIR, WHEELCHAIR:



TRANSFERS: BED, CHAIR, WHEELCHAIR - STEP 1:



Does the patient require assistance with bed, chair, or wheelchair transfers? Yes.



TRANSFERS: BED, CHAIR, WHEELCHAIR - STEP 2:



Does the patient require the assistance of a helper? Yes.



TRANSFERS: BED, CHAIR, WHEELCHAIR - STEP 3:



How much assistance does the patient require from the helper? Lifting of the patient



TRANSFERS: BED, CHAIR, WHEELCHAIR - STEP 4:



Does the helper lift the patient ONLY up? ONLY down? Up AND Down? ONLY up.



TRANSFERS: BED, CHAIR, WHEELCHAIR - SCORE:



3-MOD  



TRANSFERS: TOILET:



Activity did not occur on this shift



TRANSFERS: TOILET - SCORE:



0-UNK  



TRANSFERS: SHOWER:



Activity did not occur on this shift



TRANSFERS: SHOWER - SCORE:



0-UNK  



TRANSFERS: TUB:



Activity did not occur on this shift



TRANSFERS: TUB - SCORE:



0-UNK  



LOCOMOTION: WALK:



Activity did not occur on this shift



LOCOMOTION: WALK - SCORE:



0-UNK  



LOCOMOTION: WHEELCHAIR:



Activity did not occur on this shift



LOCOMOTION: WHEELCHAIR - SCORE:



0-UNK  



COMPREHENSION:



COMPREHENSION - SCORE:



0-UNK  



EXPRESSION



EXPRESSION - SCORE:



0-UNK  



SOCIAL INTERACTION:



SOCIAL INTERACTION - SCORE:



0-UNK  



PROBLEM SOLVING:



PROBLEM SOLVING - SCORE:



0-UNK  



MEMORY:



MEMORY - SCORE:



0-UNK  



SIGNATURE PANEL:



The following modified sections: Eating - Score, Bathing - Score, Grooming - Score, Dressing - Upper 
Body - Score, Dressing - Lower Body - Score, Toileting - Score, Bladder Management - Score, Bowel Man
agement - Score, Transfers: Bed, Chair, Wheelchair - Score, Transfers: Toilet - Score, Transfers: Fabi
wer - Score, Transfers: Tub - Score, Locomotion: Walk - Score, Locomotion: Wheelchair - Score, Compre
hension - Score, Expression - Score, Social Interaction - Score, Problem Solving - Score, Memory - Sc
ore were [electronically] signed by Leidy Meza CNA on Fri Jul 06 2018 09:42:59 T-0500 (Centra
l Daylight Time)

## 2018-07-06 NOTE — FAST
ENCOUNTER DATE AND TIME:



07/06/2018 08:00 (CDT)



NAME



ESPERANZA CONLEY



YOB: 1935



DATE OF ADMISSION:



07/05/2018 15:57 (CDT)



PHONE:



[phone]



AGE:



83



N#







GENDER:



Female



ENCOUNTER PHYSICIAN:



Dr. Moris Gonzalez M.D.



ADMISSION DIAGNOSIS:



- Orthopaedic Disorders 08 -  Femur (Shaft) Fracture (08.2)

Left Femoral Neck Fracture. 



EATING:



Activity did not occur on this shift



EATING - SCORE:



0-UNK  



GROOMING:



Activity did not occur on this shift



GROOMING - SCORE:



0-UNK  



BATHING:



Activity did not occur on this shift



BATHING - SCORE:



0-UNK  



DRESSING - UPPER BODY:



Activity did not occur on this shift

Patient is not dressing in public clothing



ARTICLES SCORE



Total number of steps: 0



DRESSING - UPPER BODY - SCORE:



0-UNK  



DRESSING - LOWER BODY:



Activity did not occur on this shift

Patient is not dressing in public clothing



ARTICLES SCORE



Total number of steps: 0



DRESSING - LOWER BODY - SCORE:



0-UNK  



TOILETING:



Activity did not occur on this shift



TOILETING - SCORE:



0-UNK  



BLADDER MANAGEMENT:



Activity did not occur on this shift



BLADDER MANAGEMENT - SCORE:



7-IND  



BOWEL MANAGEMENT:



Activity did not occur on this shift



BOWEL MANAGEMENT - SCORE:



7-IND  



TRANSFERS: BED, CHAIR, WHEELCHAIR:



Activity did not occur on this shift



TRANSFERS: BED, CHAIR, WHEELCHAIR - SCORE:



0-UNK  



TRANSFERS: TOILET:



Activity did not occur on this shift



TRANSFERS: TOILET - SCORE:



0-UNK  



TRANSFERS: SHOWER:



Activity did not occur on this shift



TRANSFERS: SHOWER - SCORE:



0-UNK  



TRANSFERS: TUB:



Activity did not occur on this shift



TRANSFERS: TUB - SCORE:



0-UNK  



LOCOMOTION: WALK:



Activity did not occur on this shift



LOCOMOTION: WALK - SCORE:



0-UNK  



LOCOMOTION: WHEELCHAIR:



Activity did not occur on this shift



LOCOMOTION: WHEELCHAIR - SCORE:



0-UNK  



LOCOMOTION: STAIRS:



Activity did not occur on this shift



LOCOMOTION: STAIRS - SCORE:



0-UNK  



COMPREHENSION:



COMPREHENSION - STEP 1:



Does the patient require help to understand complex and abstract ideas (such as current events, finan
nora, discharge planning, medical issues, relationships, etc)? Yes.



COMPREHENSION - STEP 2:



Does the patient require help to understand questions or statements about basic needs or ideas (such 
as hunger, thirst, sleep, safety, daily schedule, room location, or discomfort) half or more of the t
saleem? No.



COMPREHENSION - STEP 3:



How often does the patient need help to understand directions and conversation about basic needs? 10%
 - 24% of the time



COMPREHENSION - SCORE:



4-MIN  



EXPRESSION



EXPRESSION - STEP 1:



Does the patient require help expressing complex and abstract ideas (such as current events, finances
, discharge planning, medical issues, relationships, etc)? No.



EXPRESSION - STEP 2:



Does the patient need extra time, require an assistive device (such as augmentive communication syste
m or a communication board), OR does s/he have mild difficulty expressing complex and abstract ideas 
(including mild dysarthria or mild word-find problems)? Yes.



EXPRESSION - SCORE:



6-ANUJA  



SOCIAL INTERACTION:



SOCIAL INTERACTION - STEP 1:



Does the patient require a helper to interact with others in social and therapeutic situations? No.



SOCIAL INTERACTION - STEP 2:



Does the patient need extra time in social situations, OR does s/he interact with staff, other patien
ts, and family members ONLY in structured environments, OR does s/he require medication for social in
teraction? Yes, patient needs extra time



SOCIAL INTERACTION - SCORE:



6-ANUJA  



PROBLEM SOLVING:



PROBLEM SOLVING - STEP 1:



Does the patient need help to solve complex problems such as managing a checking account or confronti
ng interpersonal problems? Yes.



PROBLEM SOLVING - STEP 2:



Does the patient solve basic routine problems half or more of the time? Yes.



PROBLEM SOLVING - STEP 3:



How often does the patient need help to solve basic routine problems? 25%-49% of the time



PROBLEM SOLVING - SCORE:



3-MOD  



MEMORY:



MEMORY - STEP 1:



Does the patient need help to remember frequently encountered people, daily routines, and executing r
equests? Yes.



MEMORY - STEP 2:



How often does the patient need help to remember frequently encountered people, daily routines, and e
xecuting requests? 25% - 49% of the time



MEMORY - SCORE:



3-MOD  



SIGNATURE PANEL:



The following modified sections: Comprehension - Score, Expression - Score, Social Interaction - Scor
e, Problem Solving - Score, Memory - Score were [electronically] signed by ST Jj on Fri Ju
l 06 2018 15:00:12 GMT-0500 (Central Daylight Time)

## 2018-07-06 NOTE — R.HP
FACILITY:



[facility]



ENCOUNTER DATE AND TIME:



07/06/2018 16:18 (CDT)



MR#:



[id number]



ACCT#:



[id number]



NAME



ESPERANZA CONLEY



ADDRESS:



[facility]



CITY:



[place]



ZIP



[address]



PHONE:



[phone]



YOB: 1935



AGE:



83



SSN#







GENDER:



Female



DEXTERITY



Right-handed



MARITAL STATUS







RACE



White



PRE-HOSPITAL LIVING SETTING



02 - [facility] 



PRE-HOSPITAL LIVING WITH



Family/Relatives 



ENCOUNTER PHYSICIAN:



Dr. Moris Gonzalez M.D.



REFERRING DOCTOR:



kristyn Gill



DATE OF ADMISSION:



07/05/2018 15:57 (CDT)



REFERRING FACILITY



[facility] 



ADMISSION DIAGNOSIS:



Left Femoral Neck Fracture



ONSET DATE:



07/02/2018



PRIMARY DIAGNOSIS-RELATED SURGERIES:



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



SECONDARY/COMORBID DIAGNOSES (TIERED):



- N/A

HTN

COPD

HIGH CHOLESTEROL

LUNG CANCER

ATRIAL FIBRILLATION

HYPOTHYROIDISM



HISTORY OF PRESENT ILLNESS (HPI):



Pt. is a 84 yo Right-handed white female.

Her impairment category is Orthopaedic Disorders 08 -  Femur (Shaft) Fracture (08.2).

Pre-morbidly, Pt. was independent/mod-I in Social Cognition, Self-Care, Locomotion, Sphincter Control
, Transfers Control, and Communication; and she had good Sphincter Control.

Currently, she has deficits of Balance, Self-Care, Locomotion, Endurance, Safety Awareness, and Trans
fers Control.

Pt. is now referred to [facility] for acute in-patient rehabilitation in order
 to maximize patient's functional independence in activities of daily living, strength, ROM, and mobi
lity.

Patient has realistic goal of being discharged at assistance level 6-Brian to reside at Home with  Fam
urssell/Relatives.



MEDICATION ALLERGIES:



CODEINE

CEPHALEXIN



ENVIRONMENTAL ALLERGIES:





None Known

- Substance Allergies

None Known

- Other Allergies

None Known



PAST MEDICAL HISTORY:



ATRIAL FIBRILLATION

COPD

HIGH CHOLESTEROL

HTN

HYPOTHYROIDISM

LUNG CANCER



PAST SURGICAL HISTORY:



Partial lung removal

Hysterectomy

Left wrist surgery



FAMILY HISTORY:



Family history is not contributory.



SOCIAL HISTORY:



- Home Living

Family/Relatives



REVIEW OF SYSTEMS:



- Gen

No Chills

No Fatigue

No Fever

- Eyes

No Double Vision

No itchiness

- ENMT

No Difficulty Swallowing

- CVS

No Chest Discomfort

No Chest Pain

No Fatigue

No Weight Gain

- Resp

No Cough

No Shortness of Breath

- GI

Continent

No Abdominal Pain

No Constipation

No Diarrhea

- 

Continent

No Kidney Pain

No Painful Urination

No Urinary Urgency

- MSK

Joint Pain

Muscle Cramps

Stiffness

- Skin

No Itching

No Rash

No Suspicious Lesions

- Neuro

Coordination Difficulty

No Difficulty with Concentration

No Memory Loss

No Seizures

Weakness

- Psych

No Anxiety

No Depression



No HIV Exposure

No Persistent Infections

No Seasonal Allergies

- Endo

No Cold/Heat Intolerance

No Excessive Hunger

No Excessive Thirst

No Excessive Urination



PHYSICAL EXAM



- Gen

Alert and awake

Lying in bed

No apparent distress

Oriented to: person, time, and place

- Skin

No skin breakdown.



Normacephalic

- Eyes

No abnormalities

- ENMT

No abnormalities

- Neck

No abnormalities

- CVS

RRR

- Chest

Clear

- Abd

Soft

- GI

Non distended



Deferred

- 

No abnormalities

- Ext

Mild postoperative edema in the left lower extremity.

- MSK

4+/5 weakness in left lower extremity

- Neuro

4/5 strength left lower extremities.

- Psych

No abnormalities



VITAL SIGNS



Temperature: 97.8 F

SBP/DBP: 149/64

Pulse: 77

Resp: 15



NURSING:



- Shower

allowing shower

- Skin

care per protocol



PRECAUTIONS:



- Weight Bearing Precaution

WBAT left LE

NWB Left UE

- Fall Precaution

Bed and chair alarm



ACTIVITIES



OOB only with supervision



FUNCTIONAL STATUS:



- Self-Care

A. Eating  Ind  sup   

B. Grooming  Ind  sup   

C. Bathing  Ind  sup   

D. Dressing - Upper   Ind  Ben   

E. Dressing - Lower   Ind  Ben   

F. Toileting  Ind  Ben   

- Sphincter Control

G: Bladder control   Ind  Ind   

H: Bowel control   Ind  Ind   

- Transfers Control

I. Bed/Chair/Wheelchair   Ind  maxA   

J. Toilet  Ind  maxA   

K. Tub/Shower  Ind  ADNO   

- Locomotion

L. Walk/Wheelchair (C)   Ind  maxA   

L. Walk/Wheelchair (W)   Ind  maxA   

M. Stairs  Ind  ADNO   

- Communication

N. Comprehension (B)   Ind  Ind   

O. Expression (B)   Ind  Ind   

- Social Cognition

P. Social Interaction  Ind  Ind   

Q. Problem Solving  Ind  Ind   

R. Memory  Ind  Ind   

- Endurance

Fair

- Balance

Fair

- Safety Awareness

Fair



CURRENT FUNC. DEFICITS:



Balance, Self-Care, Locomotion, Endurance, Safety Awareness, and Transfers Control



ASSESSMENT:



Pt. is a 84 yo Right-handed white female.Her impairment category is Orthopaedic Disorders 08 -  Femur
 (Shaft) Fracture (08.2).Pre-morbidly, Pt. was independent/mod-I in Social Cognition, Self-Care, Loco
motion, Sphincter Control, Transfers Control, and Communication; and she had good Sphincter Control.C
urrently, she has deficits of Balance, Self-Care, Locomotion, Endurance, Safety Awareness, and Transf
ers Control.Pt. is now referred to [facility] for acute in-patient rehabilitat
ion in order to maximize patient's functional independence in activities of daily living, strength, R
OM, and mobility.- Rehab Goal

Patient has realistic goal of being discharged at assistance level 6-Brian to reside at Home with  Fam
russell/Relatives.

REHAB PLAN:



- Physical Therapy

Decreased range of motion - to improve, our physical therapists will perform initial evaluation of pt
's status upon admission and devise an individualized program for increasing patient's Range of Motio
n.

Gait dysfunction - to improve, our physical therapists will perform initial evaluation of pt's status
 upon admission and devise an individualized program for Gait Training, and Wheel Chair mobility

Inability to transfer - to improve, our physical therapists will perform initial evaluation of pt's s
tatus upon admission and devise an individualized program for Bed mobility

Need for home safety evaluation - to improve, our physical therapists will perform initial evaluation
 of pt's status upon admission and devise an individualized program for Home Evaluation

Need in caregiver upon discharge - to improve, our physical therapists will perform initial evaluatio
n of pt's status upon admission and devise an individualized program for Caregiver Training

New precaution - to improve, our physical therapists will perform initial evaluation of pt's status u
rhona admission and devise an individualized program for Patient precaution education

 Edema - to improve, our physical therapists will perform initial evaluation of pt's status upon admi
ssion and devise an individualized program for Elevation Training, and Lymphedema Therapy

Poor balance - to improve, our physical therapists will perform initial evaluation of pt's status upo
n admission and devise an individualized program for Balance Training

Poor endurance - to improve, our physical therapists will perform initial evaluation of pt's status u
rhona admission and devise an individualized program for Endurance Training

Weakness - to improve, our physical therapists will perform initial evaluation of pt's status upon ad
mission and devise an individualized program for Aquatic Therapy, Neuromuscular Reeducation, and Stre
ngthening

 Achieving independence - to improve, our physical therapists will perform initial evaluation of pt's
 status upon admission and devise an individualized program for Community Reintegration Activities

- Occupational Therapy

ADL deficits - to improve, our occupation therapists will perform initial evaluation of pt's status u
rhona admission and devise an individualized program for Bathing, Bed mobility, Community Reintegration
, Cooking, Dressing, Eating, Fine Motor Skills, Grooming, Homemaking, Kitchen Mobility, Laundry, Cristina
ent Education, Safety Awareness, Splinting - Positioning, Transfers(Toilet, Tub, Shower), and Wheel C
hair Management

Need for care giver - to improve, our occupation therapists will perform initial evaluation of pt's s
tatus upon admission and devise an individualized program for Caregiver Training

Weakness - to improve, our occupation therapists will perform initial evaluation of pt's status upon 
admission and devise an individualized program for Aquatic Therapy, Balance, Endurance, UE ROM, and U
E strengthening



MEDICAL PLAN:



- Diet Type

Start Regular

- Diet - Liquid Texture

Start Regular

- Tube Feed

Start N/A

- Weight Bearing Precaution

 WBAT left LE

 NWB Left UE

- Fall Precaution

 Bed and chair alarm

- Skin

care per protocol

- Diet - Solid Texture

Regular

- Shower

 shower



DISCHARGE PLAN:



- Estimated Length of Stay (days)

14. 



- Consensus on plan

Discharge plan has been discussed with primary caregiver. Patient/Family is in agreement with the aixa
n. Primary caregiver is in agreement with the plan. 



- Patient/Family Goals

Return home with assistance. 



- Planned Living Setting Upon Discharge

Home, to live with Family/Relatives. 



SIGNATURE PANEL:



Electronically signed by Dr. Moris Gonzalez M.D. on 07/06/2018 at 16:25 (CDT)

## 2018-07-07 RX ADMIN — Medication SCH ML: at 20:06

## 2018-07-07 RX ADMIN — Medication SCH TAB: at 08:00

## 2018-07-07 RX ADMIN — LEVOTHYROXINE SODIUM SCH MG: 0.1 TABLET ORAL at 05:23

## 2018-07-07 RX ADMIN — HYDROCODONE BITARTRATE AND ACETAMINOPHEN PRN TAB: 10; 325 TABLET ORAL at 13:43

## 2018-07-07 RX ADMIN — IRON SUPPLEMENT SCH MG: 325 TABLET ORAL at 08:00

## 2018-07-07 RX ADMIN — HYDROCODONE BITARTRATE AND ACETAMINOPHEN PRN TAB: 10; 325 TABLET ORAL at 07:03

## 2018-07-07 RX ADMIN — HYDROCODONE BITARTRATE AND ACETAMINOPHEN PRN TAB: 10; 325 TABLET ORAL at 20:04

## 2018-07-07 RX ADMIN — SENNOSIDES AND DOCUSATE SODIUM PRN TAB: 8.6; 5 TABLET ORAL at 20:05

## 2018-07-07 RX ADMIN — BISOPROLOL FUMARATE SCH MG: 5 TABLET, COATED ORAL at 08:00

## 2018-07-07 RX ADMIN — ENOXAPARIN SODIUM SCH MG: 30 INJECTION SUBCUTANEOUS at 16:21

## 2018-07-07 RX ADMIN — SERTRALINE HYDROCHLORIDE SCH MG: 100 TABLET ORAL at 08:00

## 2018-07-07 RX ADMIN — DOXEPIN HYDROCHLORIDE SCH MG: 10 CAPSULE ORAL at 08:00

## 2018-07-07 RX ADMIN — MONTELUKAST SODIUM SCH MG: 10 TABLET, FILM COATED ORAL at 08:00

## 2018-07-07 RX ADMIN — GABAPENTIN SCH MG: 300 CAPSULE ORAL at 20:04

## 2018-07-07 RX ADMIN — ISOSORBIDE MONONITRATE SCH MG: 30 TABLET, EXTENDED RELEASE ORAL at 08:00

## 2018-07-07 RX ADMIN — Medication SCH PATCH: at 07:59

## 2018-07-07 RX ADMIN — AMIODARONE HYDROCHLORIDE SCH MG: 200 TABLET ORAL at 08:00

## 2018-07-07 RX ADMIN — GABAPENTIN SCH MG: 300 CAPSULE ORAL at 08:00

## 2018-07-07 RX ADMIN — NIFEDIPINE SCH MG: 30 TABLET, FILM COATED, EXTENDED RELEASE ORAL at 20:07

## 2018-07-07 RX ADMIN — PANTOPRAZOLE SODIUM SCH MG: 40 TABLET, DELAYED RELEASE ORAL at 08:00

## 2018-07-07 RX ADMIN — MELATONIN PRN MG: 3 TAB ORAL at 22:33

## 2018-07-07 RX ADMIN — Medication SCH: at 08:00

## 2018-07-07 NOTE — FAST
ENCOUNTER DATE AND TIME:



07/06/2018 08:00 (CDT)



NAME



ESPERANZA CONLEY



YOB: 1935



DATE OF ADMISSION:



07/05/2018 15:57 (CDT)



PHONE:



[phone]



AGE:



83



N#







GENDER:



Female



ENCOUNTER PHYSICIAN:



Dr. Moris Gonzalez M.D.



ADMISSION DIAGNOSIS:



- Orthopaedic Disorders 08 -  Femur (Shaft) Fracture (08.2)

Left Femoral Neck Fracture. 



EATING:



EATING - STEP 1:



Does the patient require assistance when eating? No.



EATING - SCORE:



7-IND  



GROOMING:



Comb/brush hair

Oral care

Wash, rinse, and dry face

Wash, rinse, and dry hands



GROOMING - STEP 1:



Does the patient require assistance when grooming? No.



GROOMING - SCORE:



7-IND  



BATHING:



Abdomen

Buttocks

Chest

Left arm

Left lower leg and foot

Left upper leg

Perineal area

Right arm

Right lower leg and foot

Right upper leg



BATHING - STEP 1:



Does the patient require assistance when bathing? Yes.



BATHING - STEP 2:



Does the patient require the assistance of a helper? Yes.



BATHING - STEP 3:



How much assistance does the patient require from the helper? More than just incidental help



BATHING - STEP 4:



What percent of the body parts did the patient bathe WITHOUT the helper? Half or more of the body par
ts



BATHING - SCORE:



3-MOD  



DRESSING - UPPER BODY:



ARTICLES SCORE



Total number of steps: 0



DRESSING - UPPER BODY - STEP 1:



Does the patient require help when dressing above the waist? Yes.



DRESSING - UPPER BODY - STEP 2:



Does the patient require the assistance of a helper? Yes.



DRESSING - UPPER BODY - STEP 3:



Does the helper touch the patient while dressing? No.



DRESSING - UPPER BODY - SCORE:



5-SUP  



DRESSING - LOWER BODY:



Elastic waist pants (three steps)  

Slip-on shoe - Left foot (one step)  

Slip-on shoe - Right foot (one step)  

Underwear (three steps)  



ARTICLES SCORE



Total number of steps: 8



DRESSING - LOWER BODY - STEP 1:



Does the patient require help when dressing below the waist? Yes.



DRESSING - LOWER BODY - STEP 2:



Does the patient require the assistance of a helper? Yes.



DRESSING - LOWER BODY - STEP 3:



Does the helper touch the patient while dressing? Yes.



DRESSING - LOWER BODY - STEP 4:



How many of the total steps does the patient complete on his/her own? 0



DRESSING - LOWER BODY - STEP 5:



Does patient require total assistance for dressing below the waist such as the helper holding clothin
g and performing basically all the activities? Yes.



DRESSING - LOWER BODY - SCORE:



1-DEP  



TOILETING:



Activity did not occur on this shift



TOILETING - SCORE:



0-UNK  



BLADDER MANAGEMENT:



Activity did not occur on this shift



BLADDER MANAGEMENT - SCORE:



7-IND  



BOWEL MANAGEMENT:



Activity did not occur on this shift



BOWEL MANAGEMENT - SCORE:



7-IND  



TRANSFERS: BED, CHAIR, WHEELCHAIR:



Activity did not occur on this shift



TRANSFERS: BED, CHAIR, WHEELCHAIR - SCORE:



0-UNK  



TRANSFERS: TOILET:



Activity did not occur on this shift



TRANSFERS: TOILET - SCORE:



0-UNK  



TRANSFERS: SHOWER:



TRANSFERS: SHOWER - STEP 1:



Does the patient require assistance with shower transfers? Yes.



TRANSFERS: SHOWER - STEP 2:



Does the patient require the assistance of a helper? Yes.



TRANSFERS: SHOWER - STEP 3:



How much assistance does the patient require from the helper? More than incidental help



TRANSFERS: SHOWER - STEP 4:



How much more help does the patient require from the helper? Lifting the patient up AND down from the
 wheelchair onto the shower chair



TRANSFERS: SHOWER - SCORE:



2-MAX  



TRANSFERS: TUB:



Activity did not occur on this shift



TRANSFERS: TUB - SCORE:



0-UNK  



LOCOMOTION: WALK:



Activity did not occur on this shift



LOCOMOTION: WALK - SCORE:



0-UNK  



LOCOMOTION: WHEELCHAIR:



Activity did not occur on this shift



LOCOMOTION: WHEELCHAIR - SCORE:



0-UNK  



LOCOMOTION: STAIRS:



Activity did not occur on this shift



LOCOMOTION: STAIRS - SCORE:



0-UNK  



COMPREHENSION:



COMPREHENSION: TYPE:



Both



COMPREHENSION - STEP 1:



Does the patient require help to understand complex and abstract ideas (such as current events, finan
nora, discharge planning, medical issues, relationships, etc)? No.



COMPREHENSION - STEP 2:



Does the patient need extra time, require an assistive device (such as glasses, hearing aids, or an a
ugmentative communication system), OR does s/he have mild difficulty expressing complex and abstract 
ideas (including mild dysarthria or mild word-finding problems)? Yes.



COMPREHENSION - SCORE:



6-ANUJA  



EXPRESSION



EXPRESSION: TYPE:



Both



EXPRESSION - STEP 1:



Does the patient require help expressing complex and abstract ideas (such as current events, finances
, discharge planning, medical issues, relationships, etc)? No.



EXPRESSION - STEP 2:



Does the patient need extra time, require an assistive device (such as augmentive communication syste
m or a communication board), OR does s/he have mild difficulty expressing complex and abstract ideas 
(including mild dysarthria or mild word-find problems)? Yes.



EXPRESSION - SCORE:



6-ANUJA  



SOCIAL INTERACTION:



SOCIAL INTERACTION - STEP 1:



Does the patient require a helper to interact with others in social and therapeutic situations? No.



SOCIAL INTERACTION - STEP 2:



Does the patient need extra time in social situations, OR does s/he interact with staff, other patien
ts, and family members ONLY in structured environments, OR does s/he require medication for social in
teraction? Yes, patient needs extra time



SOCIAL INTERACTION - SCORE:



6-ANUJA  



PROBLEM SOLVING:



PROBLEM SOLVING - STEP 1:



Does the patient need help to solve complex problems such as managing a checking account or confronti
ng interpersonal problems? Yes.



PROBLEM SOLVING - STEP 2:



Does the patient solve basic routine problems half or more of the time? Yes.



PROBLEM SOLVING - STEP 3:



How often does the patient need help to solve basic routine problems? 10%-24% of the time



PROBLEM SOLVING - SCORE:



4-MIN  



MEMORY:



MEMORY - STEP 1:



Does the patient need help to remember frequently encountered people, daily routines, and executing r
equests? Yes.



MEMORY - STEP 2:



How often does the patient need help to remember frequently encountered people, daily routines, and e
xecuting requests? 10% - 24% of the time



MEMORY - SCORE:



4-MIN  



SIGNATURE PANEL:



The following modified sections: Memory - Score, Problem Solving - Score, Social Interaction - Score,
 Expression - Score, Comprehension - Score, Transfers: Bed, Chair, Wheelchair - Score, Transfers: Brant
let - Score, Transfers: Tub - Score, Transfers: Shower - Score, Eating - Score, Grooming - Score, Bat
mary - Score, Dressing - Upper Body - Score, Dressing - Lower Body - Score, Toileting - Score were [e
lectronically] signed by Mily Raymond OT on Sat Jul 07 2018 09:42:44 T-0500 ([facility] T
saleem)

## 2018-07-07 NOTE — FAST
ENCOUNTER DATE AND TIME:



07/07/2018 08:00 (CDT)



NAME



ESPERANZA CONLEY



YOB: 1935



DATE OF ADMISSION:



07/05/2018 15:57 (CDT)



PHONE:



[phone]



AGE:



83



N#







GENDER:



Female



ENCOUNTER PHYSICIAN:



Dr. Moris Gonzalez M.D.



ADMISSION DIAGNOSIS:



- Orthopaedic Disorders 08 -  Femur (Shaft) Fracture (08.2)

Left Femoral Neck Fracture. 



EATING:



Activity did not occur on this shift



EATING - SCORE:



0-UNK  



GROOMING:



Activity did not occur on this shift



GROOMING - SCORE:



0-UNK  



BATHING:



Activity did not occur on this shift



BATHING - SCORE:



0-UNK  



DRESSING - UPPER BODY:



Activity did not occur on this shift

Patient is not dressing in public clothing



ARTICLES SCORE



Total number of steps: 0



DRESSING - UPPER BODY - SCORE:



0-UNK  



DRESSING - LOWER BODY:



Activity did not occur on this shift

Patient is not dressing in public clothing



ARTICLES SCORE



Total number of steps: 0



DRESSING - LOWER BODY - SCORE:



0-UNK  



TOILETING:



Activity did not occur on this shift



TOILETING - SCORE:



0-UNK  



BLADDER MANAGEMENT:



Activity did not occur on this shift



BLADDER MANAGEMENT - SCORE:



7-IND  



BOWEL MANAGEMENT:



Activity did not occur on this shift



BOWEL MANAGEMENT - SCORE:



7-IND  



TRANSFERS: BED, CHAIR, WHEELCHAIR:



TRANSFERS: BED, CHAIR, WHEELCHAIR - STEP 1:



Does the patient require assistance with bed, chair, or wheelchair transfers? Yes.



TRANSFERS: BED, CHAIR, WHEELCHAIR - STEP 2:



Does the patient require the assistance of a helper? Yes.



TRANSFERS: BED, CHAIR, WHEELCHAIR - STEP 3:



How much assistance does the patient require from the helper? Lifting of the legs



TRANSFERS: BED, CHAIR, WHEELCHAIR - STEP 4:



How many legs does the patient require the helper to lift? both legs



TRANSFERS: BED, CHAIR, WHEELCHAIR - SCORE:



3-MOD  



TRANSFERS: TOILET:



Activity did not occur on this shift



TRANSFERS: TOILET - SCORE:



0-UNK  



TRANSFERS: SHOWER:



Activity did not occur on this shift



TRANSFERS: SHOWER - SCORE:



0-UNK  



TRANSFERS: TUB:



Activity did not occur on this shift



TRANSFERS: TUB - SCORE:



0-UNK  



LOCOMOTION: WALK:



LOCOMOTION: WALK - STEP 1:



Does the patient need help to walk 150 feet? Yes.



LOCOMOTION: WALK - STEP 2:



How much assistance does the patient require to walk a minimum of 150 feet? Patient walks less than 1
50 feet - but more than 50 feet - with the assistance of only one helper



LOCOMOTION: WALK - SCORE:



2-MAX  



LOCOMOTION: WHEELCHAIR:



Activity did not occur on this shift



LOCOMOTION: WHEELCHAIR - SCORE:



0-UNK  



LOCOMOTION: STAIRS:



Activity did not occur on this shift



LOCOMOTION: STAIRS - SCORE:



0-UNK  



COMPREHENSION:



COMPREHENSION - SCORE:



0-UNK  



EXPRESSION



EXPRESSION - SCORE:



0-UNK  



SOCIAL INTERACTION:



SOCIAL INTERACTION - SCORE:



0-UNK  



PROBLEM SOLVING:



PROBLEM SOLVING - SCORE:



0-UNK  



MEMORY:



MEMORY - SCORE:



0-UNK  



SIGNATURE PANEL:



The following modified sections: Transfers: Bed, Chair, Wheelchair - Score, Transfers: Toilet - Score
, Locomotion: Walk - Score, Locomotion: Wheelchair - Score, Locomotion: Stairs - Score were [electron
ically] signed by Forest Roberts PTA on Sat Jul 07 2018 11:15:19 GMT-0500 (Central Daylight Time)

## 2018-07-07 NOTE — FAST
ENCOUNTER PHYSICIAN:



Dr. Moris Gonzalez M.D.



EATING:



EATING - STEP 1:



Does the patient require assistance when eating? No.



EATING - SCORE:



7-IND  



DRESSING - LOWER BODY:



DRESSING - LOWER BODY - STEP 1:



Does the patient require help when dressing below the waist? Yes.



DRESSING - LOWER BODY - STEP 2:



Does the patient require the assistance of a helper? Yes.



DRESSING - LOWER BODY - STEP 3:



Does the helper touch the patient while dressing? Yes.



DRESSING - LOWER BODY - STEP 4:



How many of the total steps does the patient complete on his/her own? 0



DRESSING - LOWER BODY - STEP 5:



Does patient require total assistance for dressing below the waist such as the helper holding clothin
g and performing basically all the activities? No.



DRESSING - LOWER BODY - SCORE:



2-MAX  



COMPREHENSION:



COMPREHENSION: TYPE:



Both



COMPREHENSION - STEP 1:



Does the patient require help to understand complex and abstract ideas (such as current events, finan
nora, discharge planning, medical issues, relationships, etc)? Yes.



COMPREHENSION - STEP 2:



Does the patient require help to understand questions or statements about basic needs or ideas (such 
as hunger, thirst, sleep, safety, daily schedule, room location, or discomfort) half or more of the t
saleem? No.



COMPREHENSION - STEP 3:



How often does the patient need help to understand directions and conversation about basic needs? Les
s than 10% of the time



COMPREHENSION - SCORE:



5-SUP  



EXPRESSION



EXPRESSION: TYPE:



Vocal



EXPRESSION - STEP 1:



Does the patient require help expressing complex and abstract ideas (such as current events, finances
, discharge planning, medical issues, relationships, etc)? Yes.



EXPRESSION - STEP 2:



Does the patient require help to express basic necessities or ideas (such as hunger, thirst, sleep, s
afety, daily schedule, room location, or discomfort) half or more of the time? No.



EXPRESSION - STEP 3:



How often does the patient need help to express directions and conversation about basic needs? Less t
mercedes 10% of the time



EXPRESSION - SCORE:



5-SUP  



SOCIAL INTERACTION:



SOCIAL INTERACTION - STEP 1:



Does the patient require a helper to interact with others in social and therapeutic situations? No.



SOCIAL INTERACTION - STEP 2:



Does the patient need extra time in social situations, OR does s/he interact with staff, other patien
ts, and family members ONLY in structured environments, OR does s/he require medication for social in
teraction? No.



SOCIAL INTERACTION - SCORE:



7-IND  



PROBLEM SOLVING:



PROBLEM SOLVING - STEP 1:



Does the patient need help to solve complex problems such as managing a checking account or confronti
ng interpersonal problems? Yes.



PROBLEM SOLVING - STEP 2:



Does the patient solve basic routine problems half or more of the time? Yes.



PROBLEM SOLVING - STEP 3:



How often does the patient need help to solve basic routine problems? 25%-49% of the time



PROBLEM SOLVING - SCORE:



3-MOD  



MEMORY:



MEMORY - STEP 1:



Does the patient need help to remember frequently encountered people, daily routines, and executing r
equests? Yes.



MEMORY - STEP 2:



How often does the patient need help to remember frequently encountered people, daily routines, and e
xecuting requests? 25% - 49% of the time



MEMORY - SCORE:



3-MOD  



SIGNATURE PANEL:



The following modified sections: Eating - Score, Dressing - Lower Body - Score, Comprehension - Score
, Expression - Score, Social Interaction - Score, Problem Solving - Score, Memory - Score were [elect
ronically] signed by Samantha Olivares OT on Sat Jul 07 2018 16:45:08 GMT-0500 ([facility] Daylight Time)

## 2018-07-07 NOTE — FAST
SHIFT START DATE/TIME:



07/07/2018 07:00 (CDT)



SHIFT END DATE/TIME:



07/07/2018 19:00 (CDT)



NAME



ESPERANZA CONLEY



YOB: 1935



DATE OF ADMISSION:



07/05/2018 15:57 (CDT)



PHONE:



[phone]



AGE:



83



N#







GENDER:



Female



ENCOUNTER PHYSICIAN:



Dr. Moris Gonzalez M.D.



ADMISSION DIAGNOSIS:



- Orthopaedic Disorders 08 -  Femur (Shaft) Fracture (08.2)

Left Femoral Neck Fracture. 



EATING:



EATING - STEP 1:



Does the patient require assistance when eating? Yes.



EATING - STEP 2:



Does the patient require the assistance of a helper? Yes.



EATING - STEP 3:



Does the patient perform half or more of the eating tasks? Yes.



EATING - STEP 4:



Does the patient need only supervision, cuing, coaxing OR help to apply an orthosis OR help to cut fo
od, open containers, pour liquids, or butter bread? Yes.



EATING - SCORE:



5-SUP  



GROOMING:



Comb/brush hair

Oral care

Wash, rinse, and dry face

Wash, rinse, and dry hands



GROOMING - STEP 1:



Does the patient require assistance when grooming? Yes.



GROOMING - STEP 2:



Does the patient require the assistance of a helper? No. The patient only requires an assistive devic
e, OR takes more than reasonable time to groom, OR there is a concern for safety as the patient groom
s



GROOMING - SCORE:



6-ANUJA  



BATHING:



Activity did not occur on this shift



BATHING - SCORE:



0-UNK  



DRESSING - UPPER BODY:



Activity did not occur on this shift



ARTICLES SCORE



Total number of steps: 0



DRESSING - UPPER BODY - SCORE:



0-UNK  



DRESSING - LOWER BODY:



Activity did not occur on this shift



ARTICLES SCORE



Total number of steps: 0



DRESSING - LOWER BODY - SCORE:



0-UNK  



TOILETING:



TOILETING - STEP 1:



Does the patient require assistance with toileting? Yes.



TOILETING - STEP 2:



Does the patient require the assistance of a helper? Yes.



TOILETING - STEP 3:



How much assistance does the patient require from the helper? Only supervision



TOILETING - SCORE:



5-SUP  



BLADDER MANAGEMENT:



BLADDER MANAGEMENT - STEP 1:



Does the patient control the bladder completely and intentionally without equipment or devices or med
ications, and is always continent? No.



BLADDER MANAGEMENT - STEP 2:



Does the patient require the assistance of a helper? Yes.



BLADDER MANAGEMENT - STEP 3:



How much assistance does the patient require from the helper? Only supervision, stand-by, cuing, or c
oaxing



BLADDER MANAGEMENT - SCORE:



5-SUP  



BLADDER MANAGEMENT - FREQUENCY OF ACCIDENTS:



BLADDER MANAGEMENT(FA) - STEP 1:



How many accidents has the patient had during the current shift? 0



BOWEL MANAGEMENT:



Activity did not occur on this shift



BOWEL MANAGEMENT - SCORE:



7-IND  



BOWEL MANAGEMENT - FREQUENCY OF ACCIDENTS:



BOWEL MANAGEMENT(FA) - STEP 1:



How many accidents has the patient had during the current shift? 0



TRANSFERS: BED, CHAIR, WHEELCHAIR:



TRANSFERS: BED, CHAIR, WHEELCHAIR - STEP 1:



Does the patient require assistance with bed, chair, or wheelchair transfers? Yes.



TRANSFERS: BED, CHAIR, WHEELCHAIR - STEP 2:



Does the patient require the assistance of a helper? Yes.



TRANSFERS: BED, CHAIR, WHEELCHAIR - STEP 3:



How much assistance does the patient require from the helper? Steadying/guiding assistance



TRANSFERS: BED, CHAIR, WHEELCHAIR - SCORE:



4-MIN  



TRANSFERS: TOILET:



TRANSFERS: TOILET - STEP 1:



Does the patient require assistance with toilet transfers? Yes.



TRANSFERS: TOILET - STEP 2:



Does the patient require the assistance of a helper? Yes.



TRANSFERS: TOILET - STEP 3:



How much assistance does the patient require from the helper? Patient performs half or more of the tr
ansferring tasks



TRANSFERS: TOILET - STEP 4:



Does the patient need only incidental help such as contact guard or steadying during toilet transfer?
 Yes.



TRANSFERS: TOILET - SCORE:



4-MIN  



TRANSFERS: SHOWER:



Activity did not occur on this shift



TRANSFERS: SHOWER - SCORE:



0-UNK  



TRANSFERS: TUB:



Activity did not occur on this shift



TRANSFERS: TUB - SCORE:



0-UNK  



LOCOMOTION: WALK:



Activity did not occur on this shift



LOCOMOTION: WALK - SCORE:



0-UNK  



LOCOMOTION: WHEELCHAIR:



LOCOMOTION: WHEELCHAIR - STEP 1:



Does the patient need help to go 150 feet in a wheelchair? Yes.



LOCOMOTION: WHEELCHAIR - STEP 2:



How much assistance does the patient need from the helper? Only supervision, cuing, or coaxing



LOCOMOTION: WHEELCHAIR - SCORE:



5-SUP  



COMPREHENSION:



COMPREHENSION - STEP 1:



Does the patient require help to understand complex and abstract ideas (such as current events, finan
nora, discharge planning, medical issues, relationships, etc)? No.



COMPREHENSION - STEP 2:



Does the patient need extra time, require an assistive device (such as glasses, hearing aids, or an a
ugmentative communication system), OR does s/he have mild difficulty expressing complex and abstract 
ideas (including mild dysarthria or mild word-finding problems)? Yes.



COMPREHENSION - SCORE:



6-ANUJA  



EXPRESSION



EXPRESSION: TYPE:



Both



EXPRESSION - STEP 1:



Does the patient require help expressing complex and abstract ideas (such as current events, finances
, discharge planning, medical issues, relationships, etc)? No.



EXPRESSION - STEP 2:



Does the patient need extra time, require an assistive device (such as augmentive communication syste
m or a communication board), OR does s/he have mild difficulty expressing complex and abstract ideas 
(including mild dysarthria or mild word-find problems)? Yes.



EXPRESSION - SCORE:



6-ANUJA  



SOCIAL INTERACTION:



SOCIAL INTERACTION - STEP 1:



Does the patient require a helper to interact with others in social and therapeutic situations? No.



SOCIAL INTERACTION - STEP 2:



Does the patient need extra time in social situations, OR does s/he interact with staff, other patien
ts, and family members ONLY in structured environments, OR does s/he require medication for social in
teraction? Yes, patient needs extra time



SOCIAL INTERACTION - SCORE:



6-ANUJA  



PROBLEM SOLVING:



PROBLEM SOLVING - STEP 1:



Does the patient need help to solve complex problems such as managing a checking account or confronti
ng interpersonal problems? Yes.



PROBLEM SOLVING - STEP 2:



Does the patient solve basic routine problems half or more of the time? Yes.



PROBLEM SOLVING - STEP 3:



How often does the patient need help to solve basic routine problems? Less than 10% of the time



PROBLEM SOLVING - SCORE:



5-SUP  



MEMORY:



MEMORY - STEP 1:



Does the patient need help to remember frequently encountered people, daily routines, and executing r
equests? No.



MEMORY - STEP 2:



Does the patient have slight difficulty recognizing frequently encountered people, daily routines, or
 executing requests without the need for repetition or using self-initiated or environmental cues to 
remember? Yes.



MEMORY - SCORE:



6-ANUJA  



SIGNATURE PANEL:



The following modified sections: Eating - Score, Grooming - Score, Bathing - Score, Dressing - Upper 
Body - Score, Dressing - Lower Body - Score, Toileting - Score, Bladder Management - Score, Bowel Man
agement - Score, Transfers: Bed, Chair, Wheelchair - Score, Transfers: Toilet - Score, Transfers: Fabi
wer - Score, Transfers: Tub - Score, Locomotion: Walk - Score, Locomotion: Wheelchair - Score, Compre
hension - Score, Expression - Score, Social Interaction - Score, Problem Solving - Score, Memory - Sc
ore were [electronically] signed by VERO [email or web address] on Sat Jul 07 2018 11:15:01 GMT-0500 ([facility] Daylight Time)

## 2018-07-08 RX ADMIN — PANTOPRAZOLE SODIUM SCH MG: 40 TABLET, DELAYED RELEASE ORAL at 08:17

## 2018-07-08 RX ADMIN — GABAPENTIN SCH MG: 300 CAPSULE ORAL at 08:17

## 2018-07-08 RX ADMIN — LEVOTHYROXINE SODIUM SCH MG: 0.1 TABLET ORAL at 05:17

## 2018-07-08 RX ADMIN — ISOSORBIDE MONONITRATE SCH MG: 30 TABLET, EXTENDED RELEASE ORAL at 08:17

## 2018-07-08 RX ADMIN — SERTRALINE HYDROCHLORIDE SCH MG: 100 TABLET ORAL at 08:17

## 2018-07-08 RX ADMIN — Medication SCH PATCH: at 08:17

## 2018-07-08 RX ADMIN — MONTELUKAST SODIUM SCH MG: 10 TABLET, FILM COATED ORAL at 08:17

## 2018-07-08 RX ADMIN — IRON SUPPLEMENT SCH MG: 325 TABLET ORAL at 08:17

## 2018-07-08 RX ADMIN — Medication SCH ML: at 08:20

## 2018-07-08 RX ADMIN — BISOPROLOL FUMARATE SCH MG: 5 TABLET, COATED ORAL at 08:17

## 2018-07-08 RX ADMIN — TRAMADOL HYDROCHLORIDE PRN MG: 50 TABLET, COATED ORAL at 16:26

## 2018-07-08 RX ADMIN — Medication SCH TAB: at 08:17

## 2018-07-08 RX ADMIN — AMIODARONE HYDROCHLORIDE SCH MG: 200 TABLET ORAL at 08:17

## 2018-07-08 RX ADMIN — NIFEDIPINE SCH MG: 30 TABLET, FILM COATED, EXTENDED RELEASE ORAL at 20:04

## 2018-07-08 RX ADMIN — DOXEPIN HYDROCHLORIDE SCH MG: 10 CAPSULE ORAL at 08:17

## 2018-07-08 RX ADMIN — Medication SCH ML: at 20:04

## 2018-07-08 RX ADMIN — TRAMADOL HYDROCHLORIDE PRN MG: 50 TABLET, COATED ORAL at 08:18

## 2018-07-08 RX ADMIN — ENOXAPARIN SODIUM SCH MG: 30 INJECTION SUBCUTANEOUS at 16:24

## 2018-07-08 RX ADMIN — GABAPENTIN SCH MG: 300 CAPSULE ORAL at 20:04

## 2018-07-09 RX ADMIN — ENOXAPARIN SODIUM SCH MG: 30 INJECTION SUBCUTANEOUS at 17:25

## 2018-07-09 RX ADMIN — MONTELUKAST SODIUM SCH MG: 10 TABLET, FILM COATED ORAL at 08:00

## 2018-07-09 RX ADMIN — LEVOTHYROXINE SODIUM SCH MG: 0.1 TABLET ORAL at 05:11

## 2018-07-09 RX ADMIN — GABAPENTIN SCH MG: 300 CAPSULE ORAL at 08:00

## 2018-07-09 RX ADMIN — AMIODARONE HYDROCHLORIDE SCH MG: 200 TABLET ORAL at 08:00

## 2018-07-09 RX ADMIN — IRON SUPPLEMENT SCH MG: 325 TABLET ORAL at 08:00

## 2018-07-09 RX ADMIN — TRAMADOL HYDROCHLORIDE PRN MG: 50 TABLET, COATED ORAL at 14:40

## 2018-07-09 RX ADMIN — Medication SCH PATCH: at 07:59

## 2018-07-09 RX ADMIN — ISOSORBIDE MONONITRATE SCH MG: 30 TABLET, EXTENDED RELEASE ORAL at 07:59

## 2018-07-09 RX ADMIN — MELATONIN PRN MG: 3 TAB ORAL at 21:21

## 2018-07-09 RX ADMIN — GABAPENTIN SCH MG: 300 CAPSULE ORAL at 21:18

## 2018-07-09 RX ADMIN — Medication SCH ML: at 21:18

## 2018-07-09 RX ADMIN — PANTOPRAZOLE SODIUM SCH MG: 40 TABLET, DELAYED RELEASE ORAL at 07:59

## 2018-07-09 RX ADMIN — Medication SCH ML: at 08:00

## 2018-07-09 RX ADMIN — TRAMADOL HYDROCHLORIDE PRN MG: 50 TABLET, COATED ORAL at 08:00

## 2018-07-09 RX ADMIN — DOXEPIN HYDROCHLORIDE SCH MG: 10 CAPSULE ORAL at 07:59

## 2018-07-09 RX ADMIN — SERTRALINE HYDROCHLORIDE SCH MG: 100 TABLET ORAL at 08:00

## 2018-07-09 RX ADMIN — Medication SCH TAB: at 08:00

## 2018-07-09 RX ADMIN — NIFEDIPINE SCH MG: 30 TABLET, FILM COATED, EXTENDED RELEASE ORAL at 21:17

## 2018-07-09 RX ADMIN — TRAMADOL HYDROCHLORIDE PRN MG: 50 TABLET, COATED ORAL at 01:28

## 2018-07-09 RX ADMIN — MELATONIN PRN MG: 3 TAB ORAL at 00:06

## 2018-07-09 RX ADMIN — TRAMADOL HYDROCHLORIDE PRN MG: 50 TABLET, COATED ORAL at 21:21

## 2018-07-09 RX ADMIN — BISOPROLOL FUMARATE SCH MG: 5 TABLET, COATED ORAL at 08:00

## 2018-07-09 NOTE — FAST
SHIFT START DATE/TIME:



07/09/2018 07:00 (CDT)



SHIFT END DATE/TIME:



07/09/2018 19:00 (CDT)



NAME



ESPERANZA CONLEY



YOB: 1935



DATE OF ADMISSION:



07/05/2018 15:57 (CDT)



PHONE:



[phone]



AGE:



83



[facility]#







GENDER:



Female



ENCOUNTER PHYSICIAN:



Dr. Moris Gonzalez M.D.



ADMISSION DIAGNOSIS:



- Orthopaedic Disorders 08 -  Femur (Shaft) Fracture (08.2)

Left Femoral Neck Fracture. 



EATING:



EATING - STEP 1:



Does the patient require assistance when eating? Yes.



EATING - STEP 2:



Does the patient require the assistance of a helper? No, patient only requires an assistive device, O
R s/he takes more than reasonable time to eat, OR there is a safety concern, OR s/he requires modifie
d food consistency



EATING - SCORE:



6-ANUJA  



GROOMING:



Comb/brush hair

Oral care



GROOMING - STEP 1:



Does the patient require assistance when grooming? Yes.



GROOMING - STEP 2:



Does the patient require the assistance of a helper? No. The patient only requires an assistive devic
e, OR takes more than reasonable time to groom, OR there is a concern for safety as the patient groom
s



GROOMING - SCORE:



6-ANUJA  



BATHING:



Activity did not occur on this shift



BATHING - SCORE:



0-UNK  



DRESSING - UPPER BODY:



Activity did not occur on this shift



ARTICLES SCORE



Total number of steps: 0



DRESSING - UPPER BODY - SCORE:



0-UNK  



DRESSING - LOWER BODY:



Activity did not occur on this shift



ARTICLES SCORE



Total number of steps: 0



DRESSING - LOWER BODY - SCORE:



0-UNK  



TOILETING:



TOILETING - STEP 1:



Does the patient require assistance with toileting? Yes.



TOILETING - STEP 2:



Does the patient require the assistance of a helper? Yes.



TOILETING - STEP 3:



How much assistance does the patient require from the helper? Hands-on assistance from the helper



TOILETING - STEP 4:



Of the 3 tasks: 1) Adjusting clothing prior to use, 2) Cleansing of perineal area,  3) Adjusting clot
mary after use; How many tasks does the patient perform WITHOUT assistance of the helper? Three tasks
 with steadying assistance from the helper



TOILETING - SCORE:



4-MIN  



BLADDER MANAGEMENT:



BLADDER MANAGEMENT - STEP 1:



Does the patient control the bladder completely and intentionally without equipment or devices or med
ications, and is always continent? No.



BLADDER MANAGEMENT - STEP 2:



Does the patient require the assistance of a helper? No, patient requires and independently uses an a
ssistive device, such as a urinal, bedpan, bedside commode, catheter, absorbent pad, or collecting de
vice



BLADDER MANAGEMENT - SCORE:



6-ANUJA  



BOWEL MANAGEMENT:



Activity did not occur on this shift



BOWEL MANAGEMENT - SCORE:



7-IND  



TRANSFERS: BED, CHAIR, WHEELCHAIR:



TRANSFERS: BED, CHAIR, WHEELCHAIR - STEP 1:



Does the patient require assistance with bed, chair, or wheelchair transfers? Yes.



TRANSFERS: BED, CHAIR, WHEELCHAIR - STEP 2:



Does the patient require the assistance of a helper? Yes.



TRANSFERS: BED, CHAIR, WHEELCHAIR - STEP 3:



How much assistance does the patient require from the helper? Steadying/guiding assistance



TRANSFERS: BED, CHAIR, WHEELCHAIR - SCORE:



4-MIN  



TRANSFERS: TOILET:



TRANSFERS: TOILET - STEP 1:



Does the patient require assistance with toilet transfers? Yes.



TRANSFERS: TOILET - STEP 2:



Does the patient require the assistance of a helper? Yes.



TRANSFERS: TOILET - STEP 3:



How much assistance does the patient require from the helper? Patient performs half or more of the tr
ansferring tasks



TRANSFERS: TOILET - STEP 4:



Does the patient need only incidental help such as contact guard or steadying during toilet transfer?
 No. Patient needs more than incidental help



TRANSFERS: TOILET - SCORE:



3-MOD  



TRANSFERS: SHOWER:



Activity did not occur on this shift



TRANSFERS: SHOWER - SCORE:



0-UNK  



TRANSFERS: TUB:



Activity did not occur on this shift



TRANSFERS: TUB - SCORE:



0-UNK  



LOCOMOTION: WALK:



Activity did not occur on this shift



LOCOMOTION: WALK - SCORE:



0-UNK  



LOCOMOTION: WHEELCHAIR:



Activity did not occur on this shift



LOCOMOTION: WHEELCHAIR - SCORE:



0-UNK  



COMPREHENSION:



COMPREHENSION - SCORE:



0-UNK  



EXPRESSION



EXPRESSION - SCORE:



0-UNK  



SOCIAL INTERACTION:



SOCIAL INTERACTION - SCORE:



0-UNK  



PROBLEM SOLVING:



PROBLEM SOLVING - SCORE:



0-UNK  



MEMORY:



MEMORY - SCORE:



0-UNK  



SIGNATURE PANEL:



The following modified sections: Eating - Score, Grooming - Score, Bathing - Score, Dressing - Upper 
Body - Score, Dressing - Lower Body - Score, Toileting - Score, Bladder Management - Score, Bowel Man
agement - Score, Transfers: Bed, Chair, Wheelchair - Score, Transfers: Toilet - Score, Transfers: Fabi
wer - Score, Transfers: Tub - Score, Locomotion: Walk - Score, Locomotion: Wheelchair - Score, Compre
hension - Score, Expression - Score, Social Interaction - Score, Problem Solving - Score, Memory - Sc
ore were [electronically] signed by Cirilo Austin on Mon Jul 09 2018 15:25:47 GMT-0500 (Central Daylight
 Time)

## 2018-07-09 NOTE — R.PN
ENCOUNTER DATE AND TIME:



07/09/2018 17:37 (CDT)



NAME



ESPERANZA CONLEY



YOB: 1935



DATE OF ADMISSION:



07/05/2018 15:57 (CDT)



Left Femoral Neck FractureCHIEF COMPLAINT:



Left hip fracture.



SUBJECTIVE:



Pt denied any Shortness of Breath.

Pt denied any depression.

Ambulated 38' using a hemiwalker with minimum assistance.



VITAL SIGNS



Temperature: 97.5 F

SBP/DBP: 130/55

Pulse: 79

Resp: 14



MEDICATION ALLERGIES:



CODEINE

CEPHALEXIN



ENVIRONMENTAL ALLERGIES:





None Known

- Substance Allergies

None Known

- Other Allergies

None Known



NURSING:



- Shower

allowing shower

- Skin

care per protocol



PRECAUTIONS:



- Weight Bearing Precaution

WBAT left LE

NWB Left UE

- Fall Precaution

Bed and chair alarm



ACTIVITIES



OOB only with supervision



THERAPIES:



- Occupational Therapy

Evaluate and Treat. 



- Physical Therapy

Evaluate and Treat. 



PHYSICAL EXAM



- Gen

Alert and awake

Lying in bed

No apparent distress

Oriented to: person, time, and place

- Skin

No skin breakdown.



Normacephalic

- Eyes

No abnormalities

- ENMT

No abnormalities

- Neck

No abnormalities

- CVS

RRR

- Chest

Clear

- Abd

Soft

- GI

Non distended



Deferred

- 

No abnormalities

- Ext

Mild postoperative edema in the left lower extremity.

- MSK

4+/5 weakness in left lower extremity

- Neuro

4/5 strength left lower extremities.

- Psych

No abnormalities



ASSESSMENT:



Pt. is a 84 yo Right-handed white female.Her impairment category is Orthopaedic Disorders 08 -  Femur
 (Shaft) Fracture (08.2).Pre-morbidly, Pt. was independent/mod-I in Social Cognition, Self-Care, Loco
motion, Sphincter Control, Transfers Control, and Communication; and she had good Sphincter Control.C
urrently, she has deficits of Balance, Self-Care, Locomotion, Endurance, Safety Awareness, and Transf
ers Control.Pt. is now referred to [facility] for acute in-patient rehabilitat
ion in order to maximize patient's functional independence in activities of daily living, strength, R
OM, and mobility.- Rehab Goal

Patient has realistic goal of being discharged at assistance level 6-Brian to reside at Home with  Fam
russell/Relatives.

MDM/PLAN:



- Diet Type

Continue Regular

- Physical Therapy

 Decreased range of motion - to improve, our physical therapists will perform initial evaluation of p
t's status upon admission and devise an individualized program for increasing patient's Range of Miguel
on.

 Gait dysfunction - to improve, our physical therapists will perform initial evaluation of pt's statu
s upon admission and devise an individualized program for Gait Training, and Wheel Chair mobility

 Inability to transfer - to improve, our physical therapists will perform initial evaluation of pt's 
status upon admission and devise an individualized program for Bed mobility

 Need for home safety evaluation - to improve, our physical therapists will perform initial evaluatio
n of pt's status upon admission and devise an individualized program for Home Evaluation

 Need in caregiver upon discharge - to improve, our physical therapists will perform initial evaluati
on of pt's status upon admission and devise an individualized program for Caregiver Training

Edema - to improve, our physical therapists will perform initial evaluation of pt's status upon admis
mary and devise an individualized program for Elevation Training, and Lymphedema Therapy

 New precaution - to improve, our physical therapists will perform initial evaluation of pt's status 
upon admission and devise an individualized program for Patient precaution education

 Poor balance - to improve, our physical therapists will perform initial evaluation of pt's status up
on admission and devise an individualized program for Balance Training

 Poor endurance - to improve, our physical therapists will perform initial evaluation of pt's status 
upon admission and devise an individualized program for Endurance Training

 Weakness - to improve, our physical therapists will perform initial evaluation of pt's status upon a
dmission and devise an individualized program for Aquatic Therapy, Neuromuscular Reeducation, and Str
engthening

Achieving independence - to improve, our physical therapists will perform initial evaluation of pt's 
status upon admission and devise an individualized program for Community Reintegration Activities

- Diet - Liquid Texture

Continue Regular

- Tube Feed

Continue N/A

- Weight Bearing Precaution

WBAT left LE

NWB Left UE

- Fall Precaution

Bed and chair alarm

- Skin

 care per protocol

- Diet - Solid Texture

Continue Regular

- Shower

 allowing shower

- Occupational Therapy

 ADL deficits - to improve, our occupation therapists will perform initial evaluation of pt's status 
upon admission and devise an individualized program for Bathing, Bed mobility, Community Reintegratio
n, Cooking, Dressing, Eating, Fine Motor Skills, Grooming, Homemaking, Kitchen Mobility, Laundry, Pat
ient Education, Safety Awareness, Splinting - Positioning, Transfers(Toilet, Tub, Shower), and Wheel 
Chair Management

 Need for care giver - to improve, our occupation therapists will perform initial evaluation of pt's 
status upon admission and devise an individualized program for Caregiver Training

 Weakness - to improve, our occupation therapists will perform initial evaluation of pt's status upon
 admission and devise an individualized program for Aquatic Therapy, Balance, Endurance, UE ROM, and 
UE strengthening



FUNCTIONAL STATUS: UPDATED AT WEEKLY TEAM CONFERENCE



- Bladder

Same accident frequency: 7-Ind - No accidents in the past 7 days

- Bowel

Same accident frequency: 7-Ind - No accidents in the past 7 days

- Walking

Same score based on distance walked: 0(N/A)

- Wheelchair

Same score based on distance traveled: 0(N/A)



FUNCTIONAL STATUS:



- Self-Care

A. Eating    sup   

B. Grooming    sup   

C. Bathing    sup   

D. Dressing - Upper     Ben   

E. Dressing - Lower     Ben   

F. Toileting    Ben   

- Sphincter Control

G: Bladder control     Ind   

H: Bowel control     Ind   

- Transfers Control

I. Bed/Chair/Wheelchair     maxA   

J. Toilet    maxA   

K. Tub/Shower    ADNO   

- Locomotion

L. Walk/Wheelchair (C)     maxA   

L. Walk/Wheelchair (W)     maxA   

M. Stairs    ADNO   

- Communication

N. Comprehension (B)     Ind   

O. Expression (B)     Ind   

- Social Cognition

P. Social Interaction    Ind   

Q. Problem Solving    Ind   

R. Memory    Ind   

- Endurance

Fair

- Balance

Fair

- Safety Awareness

Fair



CURRENT FUNC. DEFICITS:



Balance, Self-Care, Locomotion, Endurance, Safety Awareness, and Transfers Control



SIGNATURE PANEL:



Electronically signed by Dr. Moris Gonzalez M.D. on 07/09/2018 at 17:40 (CDT)

## 2018-07-09 NOTE — FAST
SHIFT START DATE/TIME:



07/08/2018 19:00 (CDT)



SHIFT END DATE/TIME:



07/09/2018 07:00 (CDT)



NAME



ESPERANZA CONLEY



YOB: 1935



DATE OF ADMISSION:



07/05/2018 15:57 (CDT)



PHONE:



[phone]



AGE:



83



N#







GENDER:



Female



ENCOUNTER PHYSICIAN:



Dr. Moris Gonzalez M.D.



ADMISSION DIAGNOSIS:



- Orthopaedic Disorders 08 -  Femur (Shaft) Fracture (08.2)

Left Femoral Neck Fracture. 



EATING:



Activity did not occur on this shift



EATING - SCORE:



0-UNK  



GROOMING:



Activity did not occur on this shift



GROOMING - SCORE:



0-UNK  



BATHING:



Activity did not occur on this shift



BATHING - SCORE:



0-UNK  



DRESSING - UPPER BODY:



Activity did not occur on this shift



ARTICLES SCORE



Total number of steps: 0



DRESSING - UPPER BODY - SCORE:



0-UNK  



DRESSING - LOWER BODY:



Activity did not occur on this shift



ARTICLES SCORE



Total number of steps: 0



DRESSING - LOWER BODY - SCORE:



0-UNK  



TOILETING:



TOILETING - STEP 1:



Does the patient require assistance with toileting? Yes.



TOILETING - STEP 2:



Does the patient require the assistance of a helper? Yes.



TOILETING - STEP 3:



How much assistance does the patient require from the helper? Hands-on assistance from the helper



TOILETING - STEP 4:



Of the 3 tasks: 1) Adjusting clothing prior to use, 2) Cleansing of perineal area,  3) Adjusting clot
mary after use; How many tasks does the patient perform WITHOUT assistance of the helper? One task



TOILETING - SCORE:



2-MAX  



BLADDER MANAGEMENT:



BLADDER MANAGEMENT - STEP 1:



Does the patient control the bladder completely and intentionally without equipment or devices or med
ications, and is always continent? No.



BLADDER MANAGEMENT - STEP 2:



Does the patient require the assistance of a helper? Yes.



BLADDER MANAGEMENT - STEP 3:



How much assistance does the patient require from the helper? Only supervision, stand-by, cuing, or c
oaxing



BLADDER MANAGEMENT - SCORE:



5-SUP  



BLADDER MANAGEMENT - FREQUENCY OF ACCIDENTS:



BLADDER MANAGEMENT(FA) - STEP 1:



How many accidents has the patient had during the current shift? 0



BOWEL MANAGEMENT:



BOWEL MANAGEMENT - STEP 1:



Does the patient control bowels completely and intentionally without equipment devices or medications
 AND is always continent? No.



BOWEL MANAGEMENT - STEP 2:



Does the patient require the assistance of a helper? No, patient requires medication for control such
 as stool softeners, suppositories, laxatives, enemas, or OTC medications



BOWEL MANAGEMENT - SCORE:



6-ANUJA  



BOWEL MANAGEMENT - FREQUENCY OF ACCIDENTS:



BOWEL MANAGEMENT(FA) - STEP 1:



How many accidents has the patient had during the current shift? 0



TRANSFERS: BED, CHAIR, WHEELCHAIR:



TRANSFERS: BED, CHAIR, WHEELCHAIR - STEP 1:



Does the patient require assistance with bed, chair, or wheelchair transfers? Yes.



TRANSFERS: BED, CHAIR, WHEELCHAIR - STEP 2:



Does the patient require the assistance of a helper? Yes.



TRANSFERS: BED, CHAIR, WHEELCHAIR - STEP 3:



How much assistance does the patient require from the helper? Lifting of the legs



TRANSFERS: BED, CHAIR, WHEELCHAIR - STEP 4:



How many legs does the patient require the helper to lift? both legs



TRANSFERS: BED, CHAIR, WHEELCHAIR - SCORE:



3-MOD  



TRANSFERS: TOILET:



TRANSFERS: TOILET - STEP 1:



Does the patient require assistance with toilet transfers? Yes.



TRANSFERS: TOILET - STEP 2:



Does the patient require the assistance of a helper? Yes.



TRANSFERS: TOILET - STEP 3:



How much assistance does the patient require from the helper? Patient performs half or more of the tr
ansferring tasks



TRANSFERS: TOILET - STEP 4:



Does the patient need only incidental help such as contact guard or steadying during toilet transfer?
 No. Patient needs more than incidental help



TRANSFERS: TOILET - SCORE:



3-MOD  



TRANSFERS: SHOWER:



Activity did not occur on this shift



TRANSFERS: SHOWER - SCORE:



0-UNK  



TRANSFERS: TUB:



Activity did not occur on this shift



TRANSFERS: TUB - SCORE:



0-UNK  



LOCOMOTION: WALK:



Activity did not occur on this shift



LOCOMOTION: WALK - SCORE:



0-UNK  



LOCOMOTION: WHEELCHAIR:



Activity did not occur on this shift



LOCOMOTION: WHEELCHAIR - SCORE:



0-UNK  



COMPREHENSION:



COMPREHENSION: TYPE:



Both



COMPREHENSION - STEP 1:



Does the patient require help to understand complex and abstract ideas (such as current events, finan
nora, discharge planning, medical issues, relationships, etc)? Yes.



COMPREHENSION - STEP 2:



Does the patient require help to understand questions or statements about basic needs or ideas (such 
as hunger, thirst, sleep, safety, daily schedule, room location, or discomfort) half or more of the t
saleem? No.



COMPREHENSION - STEP 3:



How often does the patient need help to understand directions and conversation about basic needs? Les
s than 10% of the time



COMPREHENSION - SCORE:



5-SUP  



EXPRESSION



EXPRESSION: TYPE:



Both



EXPRESSION - STEP 1:



Does the patient require help expressing complex and abstract ideas (such as current events, finances
, discharge planning, medical issues, relationships, etc)? Yes.



EXPRESSION - STEP 2:



Does the patient require help to express basic necessities or ideas (such as hunger, thirst, sleep, s
afety, daily schedule, room location, or discomfort) half or more of the time? No.



EXPRESSION - STEP 3:



How often does the patient need help to express directions and conversation about basic needs? Less t
mercedes 10% of the time



EXPRESSION - SCORE:



5-SUP  



SOCIAL INTERACTION:



SOCIAL INTERACTION - STEP 1:



Does the patient require a helper to interact with others in social and therapeutic situations? No.



SOCIAL INTERACTION - STEP 2:



Does the patient need extra time in social situations, OR does s/he interact with staff, other patien
ts, and family members ONLY in structured environments, OR does s/he require medication for social in
teraction? Yes, patient requires medication for social interaction



SOCIAL INTERACTION - SCORE:



6-ANUJA  



PROBLEM SOLVING:



PROBLEM SOLVING - STEP 1:



Does the patient need help to solve complex problems such as managing a checking account or confronti
ng interpersonal problems? Yes.



PROBLEM SOLVING - STEP 2:



Does the patient solve basic routine problems half or more of the time? Yes.



PROBLEM SOLVING - STEP 3:



How often does the patient need help to solve basic routine problems? Less than 10% of the time



PROBLEM SOLVING - SCORE:



5-SUP  



MEMORY:



MEMORY - STEP 1:



Does the patient need help to remember frequently encountered people, daily routines, and executing r
equests? Yes.



MEMORY - STEP 2:



How often does the patient need help to remember frequently encountered people, daily routines, and e
xecuting requests? Less than 10% of the time



MEMORY - SCORE:



5-SUP  



SIGNATURE PANEL:



The following modified sections: Eating - Score, Grooming - Score, Bathing - Score, Dressing - Upper 
Body - Score, Dressing - Lower Body - Score, Toileting - Score, Bladder Management - Score, Bowel Man
agement - Score, Transfers: Bed, Chair, Wheelchair - Score, Transfers: Toilet - Score, Transfers: Fabi
wer - Score, Transfers: Tub - Score, Locomotion: Walk - Score, Locomotion: Wheelchair - Score, Compre
hension - Score, Expression - Score, Social Interaction - Score, Problem Solving - Score, Memory - Sc
ore were [electronically] signed by Brittanie Akins RN on Mon Jul 09 2018 02:37:58 GMT-0500 (Centra
l Daylight Time)

## 2018-07-09 NOTE — FAST
ENCOUNTER DATE AND TIME:



07/09/2018 08:00 (CDT)



NAME



ESPERANZA CONLEY



YOB: 1935



DATE OF ADMISSION:



07/05/2018 15:57 (CDT)



PHONE:



[phone]



AGE:



83



N#







GENDER:



Female



ENCOUNTER PHYSICIAN:



Dr. Moris Gonzalez M.D.



ADMISSION DIAGNOSIS:



- Orthopaedic Disorders 08 -  Femur (Shaft) Fracture (08.2)

Left Femoral Neck Fracture. 



EATING:



Activity did not occur on this shift



EATING - SCORE:



0-UNK  



GROOMING:



Activity did not occur on this shift



GROOMING - SCORE:



0-UNK  



BATHING:



Activity did not occur on this shift



BATHING - SCORE:



0-UNK  



DRESSING - UPPER BODY:



Activity did not occur on this shift

Patient is not dressing in public clothing



ARTICLES SCORE



Total number of steps: 0



DRESSING - UPPER BODY - SCORE:



0-UNK  



DRESSING - LOWER BODY:



Activity did not occur on this shift

Patient is not dressing in public clothing



ARTICLES SCORE



Total number of steps: 0



DRESSING - LOWER BODY - SCORE:



0-UNK  



TOILETING:



Activity did not occur on this shift



TOILETING - SCORE:



0-UNK  



BLADDER MANAGEMENT:



Activity did not occur on this shift



BLADDER MANAGEMENT - SCORE:



7-IND  



BOWEL MANAGEMENT:



Activity did not occur on this shift



BOWEL MANAGEMENT - SCORE:



7-IND  



TRANSFERS: BED, CHAIR, WHEELCHAIR:



TRANSFERS: BED, CHAIR, WHEELCHAIR - STEP 1:



Does the patient require assistance with bed, chair, or wheelchair transfers? Yes.



TRANSFERS: BED, CHAIR, WHEELCHAIR - STEP 2:



Does the patient require the assistance of a helper? Yes.



TRANSFERS: BED, CHAIR, WHEELCHAIR - STEP 3:



How much assistance does the patient require from the helper? Steadying/guiding assistance



TRANSFERS: BED, CHAIR, WHEELCHAIR - SCORE:



4-MIN  



TRANSFERS: TOILET:



Activity did not occur on this shift



TRANSFERS: TOILET - SCORE:



0-UNK  



TRANSFERS: SHOWER:



Activity did not occur on this shift



TRANSFERS: SHOWER - SCORE:



0-UNK  



TRANSFERS: TUB:



Activity did not occur on this shift



TRANSFERS: TUB - SCORE:



0-UNK  



LOCOMOTION: WALK:



Patient walks less than 50 feet



LOCOMOTION: WALK - SCORE:



1-DEP  



LOCOMOTION: WHEELCHAIR:



Activity did not occur on this shift



LOCOMOTION: WHEELCHAIR - SCORE:



0-UNK  



LOCOMOTION: STAIRS:



Activity did not occur on this shift



LOCOMOTION: STAIRS - SCORE:



0-UNK  



COMPREHENSION:



COMPREHENSION - SCORE:



0-UNK  



EXPRESSION



EXPRESSION - SCORE:



0-UNK  



SOCIAL INTERACTION:



SOCIAL INTERACTION - SCORE:



0-UNK  



PROBLEM SOLVING:



PROBLEM SOLVING - SCORE:



0-UNK  



MEMORY:



MEMORY - SCORE:



0-UNK  



SIGNATURE PANEL:



The following modified sections: Transfers: Bed, Chair, Wheelchair - Score, Transfers: Toilet - Score
, Locomotion: Walk - Score, Locomotion: Wheelchair - Score, Locomotion: Stairs - Score were [electron
ically] signed by Kim Roberts PTA on Mon Jul 09 2018 15:05:51 GMT-0500 (Central Daylight Time)

## 2018-07-09 NOTE — FAST
ENCOUNTER DATE AND TIME:



07/09/2018 08:00 (CDT)



NAME



ESPERANZA CONLEY



YOB: 1935



DATE OF ADMISSION:



07/05/2018 15:57 (CDT)



PHONE:



[phone]



AGE:



83



N#







GENDER:



Female



ENCOUNTER PHYSICIAN:



Dr. Moris Gonzalez M.D.



ADMISSION DIAGNOSIS:



- Orthopaedic Disorders 08 -  Femur (Shaft) Fracture (08.2)

Left Femoral Neck Fracture. 



EATING:



EATING - STEP 1:



Does the patient require assistance when eating? No.



EATING - SCORE:



7-IND  



GROOMING:



Comb/brush hair

Oral care

Wash, rinse, and dry face

Wash, rinse, and dry hands



GROOMING - STEP 1:



Does the patient require assistance when grooming? No.



GROOMING - SCORE:



7-IND  



BATHING:



Abdomen

Buttocks

Chest

Left arm

Left lower leg and foot

Left upper leg

Perineal area

Right arm

Right lower leg and foot

Right upper leg



BATHING - STEP 1:



Does the patient require assistance when bathing? Yes.



BATHING - STEP 2:



Does the patient require the assistance of a helper? Yes.



BATHING - STEP 3:



How much assistance does the patient require from the helper? Only supervision, cuing, coaxing, instr
uctions, encouragement



BATHING - SCORE:



5-SUP  



DRESSING - UPPER BODY:



Bra (three steps)  

T-shirt/pullover shirt (four steps)  



ARTICLES SCORE



Total number of steps: 7



DRESSING - UPPER BODY - STEP 1:



Does the patient require help when dressing above the waist? No.



DRESSING - UPPER BODY - SCORE:



7-IND  



DRESSING - LOWER BODY:



Elastic waist pants (three steps)  

Sock - Left foot (one step)  

Sock - Right foot (one step)  

Underwear (three steps)  



ARTICLES SCORE



Total number of steps: 8



DRESSING - LOWER BODY - STEP 1:



Does the patient require help when dressing below the waist? Yes.



DRESSING - LOWER BODY - STEP 2:



Does the patient require the assistance of a helper? Yes.



DRESSING - LOWER BODY - STEP 3:



Does the helper touch the patient while dressing? No.



DRESSING - LOWER BODY - SCORE:



5-SUP  



TOILETING:



TOILETING - STEP 1:



Does the patient require assistance with toileting? Yes.



TOILETING - STEP 2:



Does the patient require the assistance of a helper? Yes.



TOILETING - STEP 3:



How much assistance does the patient require from the helper? Only supervision



TOILETING - SCORE:



5-SUP  



BLADDER MANAGEMENT:



Activity did not occur on this shift



BLADDER MANAGEMENT - SCORE:



7-IND  



BOWEL MANAGEMENT:



Activity did not occur on this shift



BOWEL MANAGEMENT - SCORE:



7-IND  



TRANSFERS: BED, CHAIR, WHEELCHAIR:



Activity did not occur on this shift



TRANSFERS: BED, CHAIR, WHEELCHAIR - SCORE:



0-UNK  



TRANSFERS: TOILET:



TRANSFERS: TOILET - STEP 1:



Does the patient require assistance with toilet transfers? Yes.



TRANSFERS: TOILET - STEP 2:



Does the patient require the assistance of a helper? Yes.



TRANSFERS: TOILET - STEP 3:



How much assistance does the patient require from the helper? Only supervision, cuing, coaxing, OR he
lp to set out transfer equipment or to lock brakes and/or lift foot rests



TRANSFERS: TOILET - SCORE:



5-SUP  



TRANSFERS: SHOWER:



TRANSFERS: SHOWER - STEP 1:



Does the patient require assistance with shower transfers? Yes.



TRANSFERS: SHOWER - STEP 2:



Does the patient require the assistance of a helper? Yes.



TRANSFERS: SHOWER - STEP 3:



How much assistance does the patient require from the helper? Only supervision, cuing, coaxing, or he
lp to set out transfer equipment or to lock brakes and/or lift foot rests



TRANSFERS: SHOWER - SCORE:



5-SUP  



TRANSFERS: TUB:



Activity did not occur on this shift



TRANSFERS: TUB - SCORE:



0-UNK  



LOCOMOTION: WALK:



Activity did not occur on this shift



LOCOMOTION: WALK - SCORE:



0-UNK  



LOCOMOTION: WHEELCHAIR:



Activity did not occur on this shift



LOCOMOTION: WHEELCHAIR - SCORE:



0-UNK  



LOCOMOTION: STAIRS:



Activity did not occur on this shift



LOCOMOTION: STAIRS - SCORE:



0-UNK  



COMPREHENSION:



COMPREHENSION: TYPE:



Both



COMPREHENSION - STEP 1:



Does the patient require help to understand complex and abstract ideas (such as current events, finan
nora, discharge planning, medical issues, relationships, etc)? No.



COMPREHENSION - STEP 2:



Does the patient need extra time, require an assistive device (such as glasses, hearing aids, or an a
ugmentative communication system), OR does s/he have mild difficulty expressing complex and abstract 
ideas (including mild dysarthria or mild word-finding problems)? No.



COMPREHENSION - SCORE:



7-IND  



EXPRESSION



EXPRESSION: TYPE:



Both



EXPRESSION - STEP 1:



Does the patient require help expressing complex and abstract ideas (such as current events, finances
, discharge planning, medical issues, relationships, etc)? No.



EXPRESSION - STEP 2:



Does the patient need extra time, require an assistive device (such as augmentive communication syste
m or a communication board), OR does s/he have mild difficulty expressing complex and abstract ideas 
(including mild dysarthria or mild word-find problems)? No.



EXPRESSION - SCORE:



7-IND  



SOCIAL INTERACTION:



SOCIAL INTERACTION - STEP 1:



Does the patient require a helper to interact with others in social and therapeutic situations? No.



SOCIAL INTERACTION - STEP 2:



Does the patient need extra time in social situations, OR does s/he interact with staff, other patien
ts, and family members ONLY in structured environments, OR does s/he require medication for social in
teraction? No.



SOCIAL INTERACTION - SCORE:



7-IND  



PROBLEM SOLVING:



PROBLEM SOLVING - STEP 1:



Does the patient need help to solve complex problems such as managing a checking account or confronti
ng interpersonal problems? No.



PROBLEM SOLVING - STEP 2:



Does the patient require extra time to make decisions or solve problems, OR does s/he have slight dif
ficulty reading, initiating, or self-correcting in unfamiliar situations? No.



PROBLEM SOLVING - SCORE:



7-IND  



MEMORY:



MEMORY - STEP 1:



Does the patient need help to remember frequently encountered people, daily routines, and executing r
equests? No.



MEMORY - STEP 2:



Does the patient have slight difficulty recognizing frequently encountered people, daily routines, or
 executing requests without the need for repetition or using self-initiated or environmental cues to 
remember? No.



MEMORY - SCORE:



7-IND  



SIGNATURE PANEL:



The following modified sections: Eating - Score, Grooming - Score, Bathing - Score, Dressing - Upper 
Body - Score, Dressing - Lower Body - Score, Toileting - Score, Transfers: Bed, Chair, Wheelchair - S
core, Transfers: Toilet - Score, Transfers: Tub - Score, Transfers: Shower - Score, Comprehension - S
core, Expression - Score, Social Interaction - Score, Problem Solving - Score, Memory - Score were [e
lectronically] signed by Mily Raymond OT on Mon Jul 09 2018 15:45:24 T-0500 ([facility] hospital)

## 2018-07-09 NOTE — FAST
ENCOUNTER DATE AND TIME:



07/09/2018 08:00 (CDT)



NAME



ESPERANZA CONLEY



YOB: 1935



DATE OF ADMISSION:



07/05/2018 15:57 (CDT)



PHONE:



[phone]



AGE:



83



N#







GENDER:



Female



ENCOUNTER PHYSICIAN:



Dr. Moris Gonzalez M.D.



ADMISSION DIAGNOSIS:



- Orthopaedic Disorders 08 -  Femur (Shaft) Fracture (08.2)

Left Femoral Neck Fracture. 



EATING:



Activity did not occur on this shift



EATING - SCORE:



0-UNK  



GROOMING:



Activity did not occur on this shift



GROOMING - SCORE:



0-UNK  



BATHING:



Activity did not occur on this shift



BATHING - SCORE:



0-UNK  



DRESSING - UPPER BODY:



Activity did not occur on this shift

Patient is not dressing in public clothing



ARTICLES SCORE



Total number of steps: 0



DRESSING - UPPER BODY - SCORE:



0-UNK  



DRESSING - LOWER BODY:



Activity did not occur on this shift

Patient is not dressing in public clothing



ARTICLES SCORE



Total number of steps: 0



DRESSING - LOWER BODY - SCORE:



0-UNK  



TOILETING:



Activity did not occur on this shift



TOILETING - SCORE:



0-UNK  



BLADDER MANAGEMENT:



Activity did not occur on this shift



BLADDER MANAGEMENT - SCORE:



7-IND  



BOWEL MANAGEMENT:



Activity did not occur on this shift



BOWEL MANAGEMENT - SCORE:



7-IND  



TRANSFERS: BED, CHAIR, WHEELCHAIR:



Activity did not occur on this shift



TRANSFERS: BED, CHAIR, WHEELCHAIR - SCORE:



0-UNK  



TRANSFERS: TOILET:



Activity did not occur on this shift



TRANSFERS: TOILET - SCORE:



0-UNK  



TRANSFERS: SHOWER:



Activity did not occur on this shift



TRANSFERS: SHOWER - SCORE:



0-UNK  



TRANSFERS: TUB:



Activity did not occur on this shift



TRANSFERS: TUB - SCORE:



0-UNK  



LOCOMOTION: WALK:



Activity did not occur on this shift



LOCOMOTION: WALK - SCORE:



0-UNK  



LOCOMOTION: WHEELCHAIR:



Activity did not occur on this shift



LOCOMOTION: WHEELCHAIR - SCORE:



0-UNK  



LOCOMOTION: STAIRS:



Activity did not occur on this shift



LOCOMOTION: STAIRS - SCORE:



0-UNK  



COMPREHENSION:



COMPREHENSION - STEP 1:



Does the patient require help to understand complex and abstract ideas (such as current events, finan
nora, discharge planning, medical issues, relationships, etc)? Yes.



COMPREHENSION - STEP 2:



Does the patient require help to understand questions or statements about basic needs or ideas (such 
as hunger, thirst, sleep, safety, daily schedule, room location, or discomfort) half or more of the t
saleem? No.



COMPREHENSION - STEP 3:



How often does the patient need help to understand directions and conversation about basic needs? Les
s than 10% of the time



COMPREHENSION - SCORE:



5-SUP  



EXPRESSION



EXPRESSION - STEP 1:



Does the patient require help expressing complex and abstract ideas (such as current events, finances
, discharge planning, medical issues, relationships, etc)? Yes.



EXPRESSION - STEP 2:



Does the patient require help to express basic necessities or ideas (such as hunger, thirst, sleep, s
afety, daily schedule, room location, or discomfort) half or more of the time? No.



EXPRESSION - STEP 3:



How often does the patient need help to express directions and conversation about basic needs? Less t
mercedes 10% of the time



EXPRESSION - SCORE:



5-SUP  



SOCIAL INTERACTION:



SOCIAL INTERACTION - STEP 1:



Does the patient require a helper to interact with others in social and therapeutic situations? No.



SOCIAL INTERACTION - STEP 2:



Does the patient need extra time in social situations, OR does s/he interact with staff, other patien
ts, and family members ONLY in structured environments, OR does s/he require medication for social in
teraction? Yes, patient needs extra time



SOCIAL INTERACTION - SCORE:



6-ANUJA  



PROBLEM SOLVING:



PROBLEM SOLVING - STEP 1:



Does the patient need help to solve complex problems such as managing a checking account or confronti
ng interpersonal problems? Yes.



PROBLEM SOLVING - STEP 2:



Does the patient solve basic routine problems half or more of the time? Yes.



PROBLEM SOLVING - STEP 3:



How often does the patient need help to solve basic routine problems? 10%-24% of the time



PROBLEM SOLVING - SCORE:



4-MIN  



MEMORY:



MEMORY - STEP 1:



Does the patient need help to remember frequently encountered people, daily routines, and executing r
equests? Yes.



MEMORY - STEP 2:



How often does the patient need help to remember frequently encountered people, daily routines, and e
xecuting requests? 10% - 24% of the time



MEMORY - SCORE:



4-MIN  



SIGNATURE PANEL:



The following modified sections: Comprehension - Score, Expression - Score, Social Interaction - Scor
e, Problem Solving - Score, Memory - Score were [electronically] signed by SHO Brooks on Mon J
ul 09 2018 13:32:41 GMT-0500 (Central Daylight Time)

## 2018-07-10 RX ADMIN — NIFEDIPINE SCH MG: 30 TABLET, FILM COATED, EXTENDED RELEASE ORAL at 20:13

## 2018-07-10 RX ADMIN — PANTOPRAZOLE SODIUM SCH MG: 40 TABLET, DELAYED RELEASE ORAL at 08:08

## 2018-07-10 RX ADMIN — Medication SCH ML: at 08:10

## 2018-07-10 RX ADMIN — Medication SCH TAB: at 08:08

## 2018-07-10 RX ADMIN — HYDROCODONE BITARTRATE AND ACETAMINOPHEN PRN TAB: 10; 325 TABLET ORAL at 08:07

## 2018-07-10 RX ADMIN — BISOPROLOL FUMARATE SCH MG: 5 TABLET, COATED ORAL at 08:09

## 2018-07-10 RX ADMIN — ISOSORBIDE MONONITRATE SCH MG: 30 TABLET, EXTENDED RELEASE ORAL at 08:10

## 2018-07-10 RX ADMIN — TRAMADOL HYDROCHLORIDE PRN MG: 50 TABLET, COATED ORAL at 20:12

## 2018-07-10 RX ADMIN — GABAPENTIN SCH MG: 300 CAPSULE ORAL at 08:08

## 2018-07-10 RX ADMIN — IRON SUPPLEMENT SCH MG: 325 TABLET ORAL at 08:07

## 2018-07-10 RX ADMIN — ENOXAPARIN SODIUM SCH MG: 30 INJECTION SUBCUTANEOUS at 16:51

## 2018-07-10 RX ADMIN — Medication SCH ML: at 21:04

## 2018-07-10 RX ADMIN — GABAPENTIN SCH MG: 300 CAPSULE ORAL at 20:13

## 2018-07-10 RX ADMIN — LEVOTHYROXINE SODIUM SCH MG: 0.1 TABLET ORAL at 05:27

## 2018-07-10 RX ADMIN — TRAMADOL HYDROCHLORIDE PRN MG: 50 TABLET, COATED ORAL at 11:53

## 2018-07-10 RX ADMIN — MONTELUKAST SODIUM SCH MG: 10 TABLET, FILM COATED ORAL at 08:08

## 2018-07-10 RX ADMIN — HYDROCODONE BITARTRATE AND ACETAMINOPHEN PRN TAB: 10; 325 TABLET ORAL at 13:53

## 2018-07-10 RX ADMIN — AMIODARONE HYDROCHLORIDE SCH MG: 200 TABLET ORAL at 08:09

## 2018-07-10 RX ADMIN — MELATONIN PRN MG: 3 TAB ORAL at 21:04

## 2018-07-10 RX ADMIN — SERTRALINE HYDROCHLORIDE SCH MG: 100 TABLET ORAL at 08:09

## 2018-07-10 RX ADMIN — DOXEPIN HYDROCHLORIDE SCH MG: 10 CAPSULE ORAL at 08:08

## 2018-07-10 RX ADMIN — Medication SCH PATCH: at 07:33

## 2018-07-10 NOTE — FAST
ENCOUNTER DATE AND TIME:



07/10/2018 08:00 (CDT)



NAME



ESPERANZA CONLEY



YOB: 1935



DATE OF ADMISSION:



07/05/2018 15:57 (CDT)



PHONE:



[phone]



AGE:



83



N#







GENDER:



Female



ENCOUNTER PHYSICIAN:



Dr. Moris Gonzalez M.D.



ADMISSION DIAGNOSIS:



- Orthopaedic Disorders 08 -  Femur (Shaft) Fracture (08.2)

Left Femoral Neck Fracture. 



EATING:



Activity did not occur on this shift



EATING - SCORE:



0-UNK  



GROOMING:



Activity did not occur on this shift



GROOMING - SCORE:



0-UNK  



BATHING:



Activity did not occur on this shift



BATHING - SCORE:



0-UNK  



DRESSING - UPPER BODY:



Activity did not occur on this shift

Patient is not dressing in public clothing



ARTICLES SCORE



Total number of steps: 0



DRESSING - UPPER BODY - SCORE:



0-UNK  



DRESSING - LOWER BODY:



Activity did not occur on this shift

Patient is not dressing in public clothing



ARTICLES SCORE



Total number of steps: 0



DRESSING - LOWER BODY - SCORE:



0-UNK  



TOILETING:



Activity did not occur on this shift



TOILETING - SCORE:



0-UNK  



BLADDER MANAGEMENT:



Activity did not occur on this shift



BLADDER MANAGEMENT - SCORE:



7-IND  



BOWEL MANAGEMENT:



Activity did not occur on this shift



BOWEL MANAGEMENT - SCORE:



7-IND  



TRANSFERS: BED, CHAIR, WHEELCHAIR:



TRANSFERS: BED, CHAIR, WHEELCHAIR - STEP 1:



Does the patient require assistance with bed, chair, or wheelchair transfers? Yes.



TRANSFERS: BED, CHAIR, WHEELCHAIR - STEP 2:



Does the patient require the assistance of a helper? Yes.



TRANSFERS: BED, CHAIR, WHEELCHAIR - STEP 3:



How much assistance does the patient require from the helper? Steadying/guiding assistance



TRANSFERS: BED, CHAIR, WHEELCHAIR - SCORE:



4-MIN  



TRANSFERS: TOILET:



TRANSFERS: TOILET - STEP 1:



Does the patient require assistance with toilet transfers? Yes.



TRANSFERS: TOILET - STEP 2:



Does the patient require the assistance of a helper? Yes.



TRANSFERS: TOILET - STEP 3:



How much assistance does the patient require from the helper? Patient performs half or more of the tr
ansferring tasks



TRANSFERS: TOILET - STEP 4:



Does the patient need only incidental help such as contact guard or steadying during toilet transfer?
 Yes.



TRANSFERS: TOILET - SCORE:



4-MIN  



TRANSFERS: SHOWER:



Activity did not occur on this shift



TRANSFERS: SHOWER - SCORE:



0-UNK  



TRANSFERS: TUB:



Activity did not occur on this shift



TRANSFERS: TUB - SCORE:



0-UNK  



LOCOMOTION: WALK:



LOCOMOTION: WALK - STEP 1:



Does the patient need help to walk 150 feet? Yes.



LOCOMOTION: WALK - STEP 2:



How much assistance does the patient require to walk a minimum of 150 feet? Only incidental help such
 as contact guarding or steadying



LOCOMOTION: WALK - SCORE:



4-MIN  



LOCOMOTION: WHEELCHAIR:



LOCOMOTION: WHEELCHAIR - STEP 1:



Does the patient need help to go 150 feet in a wheelchair? Yes.



LOCOMOTION: WHEELCHAIR - STEP 2:



How much assistance does the patient need from the helper? Only incidental help such as around corner
s or over thresholds



LOCOMOTION: WHEELCHAIR - SCORE:



4-MIN  



LOCOMOTION: STAIRS:



Activity did not occur on this shift



LOCOMOTION: STAIRS - SCORE:



0-UNK  



COMPREHENSION:



COMPREHENSION - SCORE:



0-UNK  



EXPRESSION



EXPRESSION - SCORE:



0-UNK  



SOCIAL INTERACTION:



SOCIAL INTERACTION - SCORE:



0-UNK  



PROBLEM SOLVING:



PROBLEM SOLVING - SCORE:



0-UNK  



MEMORY:



MEMORY - SCORE:



0-UNK  



SIGNATURE PANEL:



The following modified sections: Transfers: Bed, Chair, Wheelchair - Score, Transfers: Toilet - Score
, Locomotion: Walk - Score, Locomotion: Wheelchair - Score, Locomotion: Stairs - Score were [nhung louis] signed by Ynes Goyal PTA on Tue Jul 10 2018 16:09:35 T-0500 (Central Daylight Time)

## 2018-07-10 NOTE — FAST
SHIFT START DATE/TIME:



07/09/2018 19:00 (CDT)



SHIFT END DATE/TIME:



07/10/2018 07:00 (CDT)



NAME



ESPERANZA CONLEY



YOB: 1935



DATE OF ADMISSION:



07/05/2018 15:57 (CDT)



PHONE:



[phone]



AGE:



83



[facility]#







GENDER:



Female



ENCOUNTER PHYSICIAN:



Dr. Moris Gonzalez M.D.



ADMISSION DIAGNOSIS:



- Orthopaedic Disorders 08 -  Femur (Shaft) Fracture (08.2)

Left Femoral Neck Fracture. 



EATING:



Activity did not occur on this shift



EATING - SCORE:



0-UNK  



GROOMING:



Activity did not occur on this shift



GROOMING - SCORE:



0-UNK  



BATHING:



Activity did not occur on this shift



BATHING - SCORE:



0-UNK  



DRESSING - UPPER BODY:



Activity did not occur on this shift



ARTICLES SCORE



Total number of steps: 0



DRESSING - UPPER BODY - SCORE:



0-UNK  



DRESSING - LOWER BODY:



Activity did not occur on this shift



ARTICLES SCORE



Total number of steps: 0



DRESSING - LOWER BODY - SCORE:



0-UNK  



TOILETING:



TOILETING - STEP 1:



Does the patient require assistance with toileting? Yes.



TOILETING - STEP 2:



Does the patient require the assistance of a helper? Yes.



TOILETING - STEP 3:



How much assistance does the patient require from the helper? Hands-on assistance from the helper



TOILETING - STEP 4:



Of the 3 tasks: 1) Adjusting clothing prior to use, 2) Cleansing of perineal area,  3) Adjusting clot
mary after use; How many tasks does the patient perform WITHOUT assistance of the helper? Three tasks
 with steadying assistance from the helper



TOILETING - SCORE:



4-MIN  



BLADDER MANAGEMENT:



BLADDER MANAGEMENT - STEP 1:



Does the patient control the bladder completely and intentionally without equipment or devices or med
ications, and is always continent? No.



BLADDER MANAGEMENT - STEP 2:



Does the patient require the assistance of a helper? No, patient requires and independently uses an a
ssistive device, such as a urinal, bedpan, bedside commode, catheter, absorbent pad, or collecting de
vice



BLADDER MANAGEMENT - SCORE:



6-ANUJA  



BOWEL MANAGEMENT:



Activity did not occur on this shift



BOWEL MANAGEMENT - SCORE:



7-IND  



TRANSFERS: BED, CHAIR, WHEELCHAIR:



TRANSFERS: BED, CHAIR, WHEELCHAIR - STEP 1:



Does the patient require assistance with bed, chair, or wheelchair transfers? Yes.



TRANSFERS: BED, CHAIR, WHEELCHAIR - STEP 2:



Does the patient require the assistance of a helper? Yes.



TRANSFERS: BED, CHAIR, WHEELCHAIR - STEP 3:



How much assistance does the patient require from the helper? Steadying/guiding assistance



TRANSFERS: BED, CHAIR, WHEELCHAIR - SCORE:



4-MIN  



TRANSFERS: TOILET:



TRANSFERS: TOILET - STEP 1:



Does the patient require assistance with toilet transfers? Yes.



TRANSFERS: TOILET - STEP 2:



Does the patient require the assistance of a helper? Yes.



TRANSFERS: TOILET - STEP 3:



How much assistance does the patient require from the helper? Patient performs half or more of the tr
ansferring tasks



TRANSFERS: TOILET - STEP 4:



Does the patient need only incidental help such as contact guard or steadying during toilet transfer?
 No. Patient needs more than incidental help



TRANSFERS: TOILET - SCORE:



3-MOD  



TRANSFERS: SHOWER:



Activity did not occur on this shift



TRANSFERS: SHOWER - SCORE:



0-UNK  



TRANSFERS: TUB:



Activity did not occur on this shift



TRANSFERS: TUB - SCORE:



0-UNK  



LOCOMOTION: WALK:



Activity did not occur on this shift



LOCOMOTION: WALK - SCORE:



0-UNK  



LOCOMOTION: WHEELCHAIR:



Activity did not occur on this shift



LOCOMOTION: WHEELCHAIR - SCORE:



0-UNK  



COMPREHENSION:



COMPREHENSION - SCORE:



0-UNK  



EXPRESSION



EXPRESSION - SCORE:



0-UNK  



SOCIAL INTERACTION:



SOCIAL INTERACTION - SCORE:



0-UNK  



PROBLEM SOLVING:



PROBLEM SOLVING - SCORE:



0-UNK  



MEMORY:



MEMORY - SCORE:



0-UNK  



SIGNATURE PANEL:



The following modified sections: Eating - Score, Grooming - Score, Bathing - Score, Dressing - Upper 
Body - Score, Dressing - Lower Body - Score, Toileting - Score, Bladder Management - Score, Bowel Man
agement - Score, Transfers: Bed, Chair, Wheelchair - Score, Transfers: Toilet - Score, Transfers: Fabi
wer - Score, Transfers: Tub - Score, Locomotion: Walk - Score, Locomotion: Wheelchair - Score, Compre
hension - Score, Expression - Score, Social Interaction - Score, Problem Solving - Score, Memory - Sc
ore were [electronically] signed by Chetna Barrera on Tue Jul 10 2018 03:48:43 T-0500 (Central Day
light Time)

## 2018-07-10 NOTE — FAST
SHIFT START DATE/TIME:



07/10/2018 07:00 (CDT)



SHIFT END DATE/TIME:



07/10/2018 19:00 (CDT)



NAME



ESPERANZA CONLEY



YOB: 1935



DATE OF ADMISSION:



07/05/2018 15:57 (CDT)



PHONE:



[phone]



AGE:



83



[facility]#







GENDER:



Female



ENCOUNTER PHYSICIAN:



Dr. Moris Gonzalez M.D.



ADMISSION DIAGNOSIS:



- Orthopaedic Disorders 08 -  Femur (Shaft) Fracture (08.2)

Left Femoral Neck Fracture. 



EATING:



EATING - STEP 1:



Does the patient require assistance when eating? Yes.



EATING - STEP 2:



Does the patient require the assistance of a helper? No, patient only requires an assistive device, O
R s/he takes more than reasonable time to eat, OR there is a safety concern, OR s/he requires modifie
d food consistency



EATING - SCORE:



6-ANUJA  



GROOMING:



Comb/brush hair

Oral care

Wash, rinse, and dry face

Wash, rinse, and dry hands



GROOMING - STEP 1:



Does the patient require assistance when grooming? Yes.



GROOMING - STEP 2:



Does the patient require the assistance of a helper? No. The patient only requires an assistive devic
e, OR takes more than reasonable time to groom, OR there is a concern for safety as the patient groom
s



GROOMING - SCORE:



6-ANUJA  



BATHING:



Activity did not occur on this shift



BATHING - SCORE:



0-UNK  



DRESSING - UPPER BODY:



Button down shirt or blouse - NOT tucked in (four steps)  



ARTICLES SCORE



Total number of steps: 4



DRESSING - UPPER BODY - STEP 1:



Does the patient require help when dressing above the waist? Yes.



DRESSING - UPPER BODY - STEP 2:



Does the patient require the assistance of a helper? Yes.



DRESSING - UPPER BODY - STEP 3:



Does the helper touch the patient while dressing? Yes.



DRESSING - UPPER BODY - STEP 4:



How many of the total steps does the patient complete on his/her own? 2



DRESSING - UPPER BODY - SCORE:



3-MOD  



DRESSING - LOWER BODY:



Elastic waist pants (three steps)  



ARTICLES SCORE



Total number of steps: 3



DRESSING - LOWER BODY - STEP 1:



Does the patient require help when dressing below the waist? Yes.



DRESSING - LOWER BODY - STEP 2:



Does the patient require the assistance of a helper? Yes.



DRESSING - LOWER BODY - STEP 3:



Does the helper touch the patient while dressing? Yes.



DRESSING - LOWER BODY - STEP 4:



How many of the total steps does the patient complete on his/her own? 2



DRESSING - LOWER BODY - SCORE:



3-MOD  



TOILETING:



TOILETING - STEP 1:



Does the patient require assistance with toileting? Yes.



TOILETING - STEP 2:



Does the patient require the assistance of a helper? Yes.



TOILETING - STEP 3:



How much assistance does the patient require from the helper? Hands-on assistance from the helper



TOILETING - STEP 4:



Of the 3 tasks: 1) Adjusting clothing prior to use, 2) Cleansing of perineal area,  3) Adjusting clot
mary after use; How many tasks does the patient perform WITHOUT assistance of the helper? Two tasks



TOILETING - SCORE:



3-MOD  



BLADDER MANAGEMENT:



BLADDER MANAGEMENT - STEP 1:



Does the patient control the bladder completely and intentionally without equipment or devices or med
ications, and is always continent? No.



BLADDER MANAGEMENT - STEP 2:



Does the patient require the assistance of a helper? No, patient requires and independently uses an a
ssistive device, such as a urinal, bedpan, bedside commode, catheter, absorbent pad, or collecting de
vice



BLADDER MANAGEMENT - SCORE:



6-ANUJA  



BOWEL MANAGEMENT:



Activity did not occur on this shift



BOWEL MANAGEMENT - SCORE:



7-IND  



TRANSFERS: BED, CHAIR, WHEELCHAIR:



TRANSFERS: BED, CHAIR, WHEELCHAIR - STEP 1:



Does the patient require assistance with bed, chair, or wheelchair transfers? Yes.



TRANSFERS: BED, CHAIR, WHEELCHAIR - STEP 2:



Does the patient require the assistance of a helper? Yes.



TRANSFERS: BED, CHAIR, WHEELCHAIR - STEP 3:



How much assistance does the patient require from the helper? Steadying/guiding assistance



TRANSFERS: BED, CHAIR, WHEELCHAIR - SCORE:



4-MIN  



TRANSFERS: TOILET:



TRANSFERS: TOILET - STEP 1:



Does the patient require assistance with toilet transfers? Yes.



TRANSFERS: TOILET - STEP 2:



Does the patient require the assistance of a helper? Yes.



TRANSFERS: TOILET - STEP 3:



How much assistance does the patient require from the helper? Patient performs half or more of the tr
ansferring tasks



TRANSFERS: TOILET - STEP 4:



Does the patient need only incidental help such as contact guard or steadying during toilet transfer?
 No. Patient needs more than incidental help



TRANSFERS: TOILET - SCORE:



3-MOD  



TRANSFERS: SHOWER:



Activity did not occur on this shift



TRANSFERS: SHOWER - SCORE:



0-UNK  



TRANSFERS: TUB:



Activity did not occur on this shift



TRANSFERS: TUB - SCORE:



0-UNK  



LOCOMOTION: WALK:



Activity did not occur on this shift



LOCOMOTION: WALK - SCORE:



0-UNK  



LOCOMOTION: WHEELCHAIR:



Activity did not occur on this shift



LOCOMOTION: WHEELCHAIR - SCORE:



0-UNK  



COMPREHENSION:



COMPREHENSION - SCORE:



0-UNK  



EXPRESSION



EXPRESSION - SCORE:



0-UNK  



SOCIAL INTERACTION:



SOCIAL INTERACTION - SCORE:



0-UNK  



PROBLEM SOLVING:



PROBLEM SOLVING - SCORE:



0-UNK  



MEMORY:



MEMORY - SCORE:



0-UNK  



SIGNATURE PANEL:



The following modified sections: Eating - Score, Grooming - Score, Bathing - Score, Dressing - Upper 
Body - Score, Dressing - Lower Body - Score, Toileting - Score, Bladder Management - Score, Bowel Man
agement - Score, Transfers: Bed, Chair, Wheelchair - Score, Transfers: Toilet - Score, Transfers: Fabi
wer - Score, Transfers: Tub - Score, Locomotion: Walk - Score, Locomotion: Wheelchair - Score, Compre
hension - Score, Expression - Score, Social Interaction - Score, Problem Solving - Score, Memory - Sc
ore were [electronically] signed by Cirilo Austin on Tue Jul 10 2018 13:54:53 GMT-0500 (Central Daylight
 Time)

## 2018-07-10 NOTE — RAD REPORT
EXAM DESCRIPTION:  CT - Head Brain Wo Cont - 7/10/2018 7:19 pm

 

CLINICAL HISTORY:  Head injury status post fall. Headache

 

COMPARISON:  None.

 

TECHNIQUE:  Computed axial tomography of the head was obtained. IV contrast was not requested.

 

All CT scans are performed using dose optimization technique as appropriate and may include automated
 exposure control or mA/KV adjustment according to patient size.

 

FINDINGS:  An intracranial  bleed is not seen .

 

The ventricles are normal in caliber.

 

No extra-axial fluid collection is noted.

 

Fluid within the sinuses/ mastoids is not seen.

 

IMPRESSION:  No acute intracranial abnormality is seen. If patient's symptoms persist  MRI of the bra
in would be recommended.

## 2018-07-10 NOTE — FAST
SHIFT START DATE/TIME:



07/10/2018 19:00 (CDT)



SHIFT END DATE/TIME:



07/11/2018 07:00 (CDT)



NAME



ESPERANZA CONLEY



YOB: 1935



DATE OF ADMISSION:



07/05/2018 15:57 (CDT)



PHONE:



[phone]



AGE:



83



N#







GENDER:



Female



ENCOUNTER PHYSICIAN:



Dr. Moris Gonzalez M.D.



ADMISSION DIAGNOSIS:



- Orthopaedic Disorders 08 -  Femur (Shaft) Fracture (08.2)

Left Femoral Neck Fracture. 



EATING:



EATING - STEP 1:



Does the patient require assistance when eating? No.



EATING - SCORE:



7-IND  



GROOMING:



Comb/brush hair

Oral care

Wash, rinse, and dry hands



GROOMING - STEP 1:



Does the patient require assistance when grooming? Yes.



GROOMING - STEP 2:



Does the patient require the assistance of a helper? No. The patient only requires an assistive devic
e, OR takes more than reasonable time to groom, OR there is a concern for safety as the patient groom
s



GROOMING - SCORE:



6-ANUJA  



BATHING:



Activity did not occur on this shift



BATHING - SCORE:



0-UNK  



DRESSING - UPPER BODY:



Bra (three steps)  

T-shirt/pullover shirt (four steps)  



ARTICLES SCORE



Total number of steps: 7



DRESSING - UPPER BODY - STEP 1:



Does the patient require help when dressing above the waist? Yes.



DRESSING - UPPER BODY - STEP 2:



Does the patient require the assistance of a helper? No. Patient only requires an assistive device, s
uch as a button hook, velcro, or reacher. OR s/he takes more than reasonable time as s/he dresses the
 upper body. OR there is a concern for safety when s/he dresses the upper body



DRESSING - UPPER BODY - SCORE:



6-ANUJA  



DRESSING - LOWER BODY:



Elastic waist pants (three steps)  



ARTICLES SCORE



Total number of steps: 3



DRESSING - LOWER BODY - STEP 1:



Does the patient require help when dressing below the waist? Yes.



DRESSING - LOWER BODY - STEP 2:



Does the patient require the assistance of a helper? Yes.



DRESSING - LOWER BODY - STEP 3:



Does the helper touch the patient while dressing? No.



DRESSING - LOWER BODY - SCORE:



5-SUP  



TOILETING:



TOILETING - STEP 1:



Does the patient require assistance with toileting? Yes.



TOILETING - STEP 2:



Does the patient require the assistance of a helper? No.



TOILETING - SCORE:



6-ANUJA  



BLADDER MANAGEMENT:



BLADDER MANAGEMENT - STEP 1:



Does the patient control the bladder completely and intentionally without equipment or devices or med
ications, and is always continent? No.



BLADDER MANAGEMENT - STEP 2:



Does the patient require the assistance of a helper? No, patient requires and independently uses an a
ssistive device, such as a urinal, bedpan, bedside commode, catheter, absorbent pad, or collecting de
vice



BLADDER MANAGEMENT - SCORE:



6-ANUJA  



BOWEL MANAGEMENT:



Activity did not occur on this shift



BOWEL MANAGEMENT - SCORE:



7-IND  



TRANSFERS: BED, CHAIR, WHEELCHAIR:



TRANSFERS: BED, CHAIR, WHEELCHAIR - STEP 1:



Does the patient require assistance with bed, chair, or wheelchair transfers? Yes.



TRANSFERS: BED, CHAIR, WHEELCHAIR - STEP 2:



Does the patient require the assistance of a helper? No. Patient only requires an assistive device fo
r bed, chair, wheelchair transfers such as a sliding board, grab bar, or brace, OR s/he takes more th
an reasonable time, OR there is a safety concern when s/he performs the transfers



TRANSFERS: BED, CHAIR, WHEELCHAIR - SCORE:



6-ANUJA  



TRANSFERS: TOILET:



TRANSFERS: TOILET - STEP 1:



Does the patient require assistance with toilet transfers? Yes.



TRANSFERS: TOILET - STEP 2:



Does the patient require the assistance of a helper? No. Patient only requires an assistive device lyons
ch as a grab bar or special seat, OR s/he takes more than reasonable time to perform toilet transfers
, OR there is a safety concern when s/he performs toilet transfers.



TRANSFERS: TOILET - SCORE:



6-ANUJA  



TRANSFERS: SHOWER:



Activity did not occur on this shift



TRANSFERS: SHOWER - SCORE:



0-UNK  



TRANSFERS: TUB:



Activity did not occur on this shift



TRANSFERS: TUB - SCORE:



0-UNK  



LOCOMOTION: WALK:



Activity did not occur on this shift



LOCOMOTION: WALK - SCORE:



0-UNK  



LOCOMOTION: WHEELCHAIR:



Activity did not occur on this shift



LOCOMOTION: WHEELCHAIR - SCORE:



0-UNK  



COMPREHENSION:



COMPREHENSION - SCORE:



0-UNK  



EXPRESSION



EXPRESSION - SCORE:



0-UNK  



SOCIAL INTERACTION:



SOCIAL INTERACTION - SCORE:



0-UNK  



PROBLEM SOLVING:



PROBLEM SOLVING - SCORE:



0-UNK  



MEMORY:



MEMORY - SCORE:



0-UNK  



SIGNATURE PANEL:



The following modified sections: Eating - Score, Grooming - Score, Bathing - Score, Dressing - Upper 
Body - Score, Dressing - Lower Body - Score, Toileting - Score, Bladder Management - Score, Bowel Man
agement - Score, Transfers: Bed, Chair, Wheelchair - Score, Transfers: Toilet - Score, Transfers: Fabi
wer - Score, Transfers: Tub - Score, Locomotion: Walk - Score, Locomotion: Wheelchair - Score, Compre
hension - Score, Expression - Score, Social Interaction - Score, Problem Solving - Score, Memory - Sc
ore were [electronically] signed by Cirilo Austin on Tue Jul 10 2018 14:04:57 GMT-0500 (Central Daylight
 Time)

## 2018-07-10 NOTE — RAD REPORT
EXAM DESCRIPTION:  RAD - Shoulder  Left 2 View - 7/10/2018 7:56 pm

 

CLINICAL HISTORY:   Left shoulder pain

 

FINDINGS:  Since July 3rd there has been no significant change in the comminuted, impacted fracture o
f the left humeral head/neck. No dislocation is seen.

## 2018-07-10 NOTE — R.PN
ENCOUNTER DATE AND TIME:



07/10/2018 17:50 (CDT)



NAME



ESPERANZA CONLEY



YOB: 1935



DATE OF ADMISSION:



07/05/2018 15:57 (CDT)



Left Femoral Neck FractureCHIEF COMPLAINT:



Left hip fracture.



SUBJECTIVE:



Pt denied any Shortness of Breath.

Pt denied any depression.

Ambulated 70' using a hemiwalker with minimum assistance.



VITAL SIGNS



Temperature: 97.4 F

SBP/DBP: 135/63

Pulse: 77

Resp: 14



MEDICATION ALLERGIES:



CODEINE

CEPHALEXIN



ENVIRONMENTAL ALLERGIES:





None Known

- Substance Allergies

None Known

- Other Allergies

None Known



NURSING:



- Shower

allowing shower

- Skin

care per protocol



PRECAUTIONS:



- Weight Bearing Precaution

WBAT left LE

NWB Left UE

- Fall Precaution

Bed and chair alarm



ACTIVITIES



OOB only with supervision



THERAPIES:



- Occupational Therapy

Evaluate and Treat. 



- Physical Therapy

Evaluate and Treat. 



PHYSICAL EXAM



- Gen

Alert and awake

Lying in bed

No apparent distress

Oriented to: person, time, and place

- Skin

No skin breakdown.



Normacephalic

- Eyes

No abnormalities

- ENMT

No abnormalities

- Neck

No abnormalities

- CVS

RRR

- Chest

Clear

- Abd

Soft

- GI

Non distended



Deferred

- 

No abnormalities

- Ext

Mild postoperative edema in the left lower extremity.

- MSK

4+/5 weakness in left lower extremity

- Neuro

4/5 strength left lower extremities.

- Psych

No abnormalities



ASSESSMENT:



Pt. is a 84 yo Right-handed white female.Her impairment category is Orthopaedic Disorders 08 -  Femur
 (Shaft) Fracture (08.2).Pre-morbidly, Pt. was independent/mod-I in Social Cognition, Self-Care, Loco
motion, Sphincter Control, Transfers Control, and Communication; and she had good Sphincter Control.C
urrently, she has deficits of Balance, Self-Care, Locomotion, Endurance, Safety Awareness, and Transf
ers Control.Pt. is now referred to [facility] for acute in-patient rehabilitat
ion in order to maximize patient's functional independence in activities of daily living, strength, R
OM, and mobility.- Rehab Goal

Patient has realistic goal of being discharged at assistance level 6-Brian to reside at Home with  Fam
russell/Relatives.

MDM/PLAN:



- Diet Type

Continue Regular

- Physical Therapy

 Decreased range of motion - to improve, our physical therapists will perform initial evaluation of p
t's status upon admission and devise an individualized program for increasing patient's Range of Miguel
on.

 Gait dysfunction - to improve, our physical therapists will perform initial evaluation of pt's statu
s upon admission and devise an individualized program for Gait Training, and Wheel Chair mobility

 Inability to transfer - to improve, our physical therapists will perform initial evaluation of pt's 
status upon admission and devise an individualized program for Bed mobility

 Need for home safety evaluation - to improve, our physical therapists will perform initial evaluatio
n of pt's status upon admission and devise an individualized program for Home Evaluation

 Need in caregiver upon discharge - to improve, our physical therapists will perform initial evaluati
on of pt's status upon admission and devise an individualized program for Caregiver Training

 Edema - to improve, our physical therapists will perform initial evaluation of pt's status upon admi
ssion and devise an individualized program for Elevation Training, and Lymphedema Therapy

 New precaution - to improve, our physical therapists will perform initial evaluation of pt's status 
upon admission and devise an individualized program for Patient precaution education

 Poor balance - to improve, our physical therapists will perform initial evaluation of pt's status up
on admission and devise an individualized program for Balance Training

 Poor endurance - to improve, our physical therapists will perform initial evaluation of pt's status 
upon admission and devise an individualized program for Endurance Training

 Weakness - to improve, our physical therapists will perform initial evaluation of pt's status upon a
dmission and devise an individualized program for Aquatic Therapy, Neuromuscular Reeducation, and Str
engthening

 Achieving independence - to improve, our physical therapists will perform initial evaluation of pt's
 status upon admission and devise an individualized program for Community Reintegration Activities

- Diet - Liquid Texture

Continue Regular

- Tube Feed

Continue N/A

- Weight Bearing Precaution

 WBAT left LE

 NWB Left UE

- Fall Precaution

 Bed and chair alarm

- Skin

 care per protocol

- Diet - Solid Texture

Continue Regular

- Shower

 allowing shower

- Occupational Therapy

 ADL deficits - to improve, our occupation therapists will perform initial evaluation of pt's status 
upon admission and devise an individualized program for Bathing, Bed mobility, Community Reintegratio
n, Cooking, Dressing, Eating, Fine Motor Skills, Grooming, Homemaking, Kitchen Mobility, Laundry, Pat
ient Education, Safety Awareness, Splinting - Positioning, Transfers(Toilet, Tub, Shower), and Wheel 
Chair Management

 Need for care giver - to improve, our occupation therapists will perform initial evaluation of pt's 
status upon admission and devise an individualized program for Caregiver Training

 Weakness - to improve, our occupation therapists will perform initial evaluation of pt's status upon
 admission and devise an individualized program for Aquatic Therapy, Balance, Endurance, UE ROM, and 
UE strengthening



FUNCTIONAL STATUS: UPDATED AT WEEKLY TEAM CONFERENCE



- Bladder

Same accident frequency: 7-Ind - No accidents in the past 7 days

- Bowel

Same accident frequency: 7-Ind - No accidents in the past 7 days

- Walking

Same score based on distance walked: 0(N/A)

- Wheelchair

Same score based on distance traveled: 0(N/A)



FUNCTIONAL STATUS:



- Self-Care

A. Eating    sup   

B. Grooming    sup   

C. Bathing    sup   

D. Dressing - Upper     Ben   

E. Dressing - Lower     Ben   

F. Toileting    Ben   

- Sphincter Control

G: Bladder control     Ind   

H: Bowel control     Ind   

- Transfers Control

I. Bed/Chair/Wheelchair     maxA   

J. Toilet    maxA   

K. Tub/Shower    ADNO   

- Locomotion

L. Walk/Wheelchair (C)     maxA   

L. Walk/Wheelchair (W)     maxA   

M. Stairs    ADNO   

- Communication

N. Comprehension (B)     Ind   

O. Expression (B)     Ind   

- Social Cognition

P. Social Interaction    Ind   

Q. Problem Solving    Ind   

R. Memory    Ind   

- Endurance

Fair

- Balance

Fair

- Safety Awareness

Fair



CURRENT FUNC. DEFICITS:



Balance, Self-Care, Locomotion, Endurance, Safety Awareness, and Transfers Control



SIGNATURE PANEL:



Electronically signed by Dr. Moris Gonzalez M.D. on 07/10/2018 at 17:52 (CDT)

## 2018-07-10 NOTE — RAD REPORT
EXAM DESCRIPTION:  RADRibs Bilateral7/10/2018 7:55 pm

 

CLINICAL HISTORY:  Bilateral rib pain.

 

FINDINGS:  No acute fracture is seen.

## 2018-07-10 NOTE — RAD REPORT
EXAM DESCRIPTION:  RAD - Hip Left 1 View - 7/10/2018 7:55 pm

 

CLINICAL HISTORY:   Left hip pain

 

FINDINGS:  A left hip prosthesis is in good position. There is no evidence of loosening. No fracture 
or dislocation is seen

## 2018-07-10 NOTE — FAST
ENCOUNTER DATE AND TIME:



07/10/2018 08:00 (CDT)



NAME



ESPERANZA CONLEY



YOB: 1935



DATE OF ADMISSION:



07/05/2018 15:57 (CDT)



PHONE:



[phone]



AGE:



83



N#







GENDER:



Female



ENCOUNTER PHYSICIAN:



Dr. Moris Gonzalez M.D.



ADMISSION DIAGNOSIS:



- Orthopaedic Disorders 08 -  Femur (Shaft) Fracture (08.2)

Left Femoral Neck Fracture. 



EATING:



Activity did not occur on this shift



EATING - SCORE:



0-UNK  



GROOMING:



Activity did not occur on this shift



GROOMING - SCORE:



0-UNK  



BATHING:



Activity did not occur on this shift



BATHING - SCORE:



0-UNK  



DRESSING - UPPER BODY:



Activity did not occur on this shift

Patient is not dressing in public clothing



ARTICLES SCORE



Total number of steps: 0



DRESSING - UPPER BODY - SCORE:



0-UNK  



DRESSING - LOWER BODY:



Activity did not occur on this shift

Patient is not dressing in public clothing



ARTICLES SCORE



Total number of steps: 0



DRESSING - LOWER BODY - SCORE:



0-UNK  



TOILETING:



Activity did not occur on this shift



TOILETING - SCORE:



0-UNK  



BLADDER MANAGEMENT:



Activity did not occur on this shift



BLADDER MANAGEMENT - SCORE:



7-IND  



BOWEL MANAGEMENT:



Activity did not occur on this shift



BOWEL MANAGEMENT - SCORE:



7-IND  



TRANSFERS: BED, CHAIR, WHEELCHAIR:



Activity did not occur on this shift



TRANSFERS: BED, CHAIR, WHEELCHAIR - SCORE:



0-UNK  



TRANSFERS: TOILET:



Activity did not occur on this shift



TRANSFERS: TOILET - SCORE:



0-UNK  



TRANSFERS: SHOWER:



Activity did not occur on this shift



TRANSFERS: SHOWER - SCORE:



0-UNK  



TRANSFERS: TUB:



Activity did not occur on this shift



TRANSFERS: TUB - SCORE:



0-UNK  



LOCOMOTION: WALK:



Activity did not occur on this shift



LOCOMOTION: WALK - SCORE:



0-UNK  



LOCOMOTION: WHEELCHAIR:



Activity did not occur on this shift



LOCOMOTION: WHEELCHAIR - SCORE:



0-UNK  



LOCOMOTION: STAIRS:



Activity did not occur on this shift



LOCOMOTION: STAIRS - SCORE:



0-UNK  



COMPREHENSION:



COMPREHENSION - STEP 1:



Does the patient require help to understand complex and abstract ideas (such as current events, finan
nora, discharge planning, medical issues, relationships, etc)? No.



COMPREHENSION - STEP 2:



Does the patient need extra time, require an assistive device (such as glasses, hearing aids, or an a
ugmentative communication system), OR does s/he have mild difficulty expressing complex and abstract 
ideas (including mild dysarthria or mild word-finding problems)? Yes.



COMPREHENSION - SCORE:



6-ANUJA  



EXPRESSION



EXPRESSION - STEP 1:



Does the patient require help expressing complex and abstract ideas (such as current events, finances
, discharge planning, medical issues, relationships, etc)? No.



EXPRESSION - STEP 2:



Does the patient need extra time, require an assistive device (such as augmentive communication syste
m or a communication board), OR does s/he have mild difficulty expressing complex and abstract ideas 
(including mild dysarthria or mild word-find problems)? Yes.



EXPRESSION - SCORE:



6-ANUJA  



SOCIAL INTERACTION:



SOCIAL INTERACTION - STEP 1:



Does the patient require a helper to interact with others in social and therapeutic situations? No.



SOCIAL INTERACTION - STEP 2:



Does the patient need extra time in social situations, OR does s/he interact with staff, other patien
ts, and family members ONLY in structured environments, OR does s/he require medication for social in
teraction? Yes, patient needs extra time



SOCIAL INTERACTION - SCORE:



6-ANUJA  



PROBLEM SOLVING:



PROBLEM SOLVING - STEP 1:



Does the patient need help to solve complex problems such as managing a checking account or confronti
ng interpersonal problems? Yes.



PROBLEM SOLVING - STEP 2:



Does the patient solve basic routine problems half or more of the time? Yes.



PROBLEM SOLVING - STEP 3:



How often does the patient need help to solve basic routine problems? 10%-24% of the time



PROBLEM SOLVING - SCORE:



4-MIN  



MEMORY:



MEMORY - STEP 1:



Does the patient need help to remember frequently encountered people, daily routines, and executing r
equests? Yes.



MEMORY - STEP 2:



How often does the patient need help to remember frequently encountered people, daily routines, and e
xecuting requests? 10% - 24% of the time



MEMORY - SCORE:



4-MIN  



SIGNATURE PANEL:



The following modified sections: Comprehension - Score, Expression - Score, Social Interaction - Scor
e, Problem Solving - Score, Memory - Score were [electronically] signed by SHO Brooks on Tue J
ul 10 2018 13:57:49 GMT-0500 (Central Daylight Time)

## 2018-07-11 RX ADMIN — SENNOSIDES AND DOCUSATE SODIUM SCH TAB: 8.6; 5 TABLET ORAL at 20:16

## 2018-07-11 RX ADMIN — GABAPENTIN SCH MG: 300 CAPSULE ORAL at 08:12

## 2018-07-11 RX ADMIN — BISOPROLOL FUMARATE SCH MG: 5 TABLET, COATED ORAL at 08:13

## 2018-07-11 RX ADMIN — LEVOTHYROXINE SODIUM SCH MG: 0.1 TABLET ORAL at 05:33

## 2018-07-11 RX ADMIN — IRON SUPPLEMENT SCH MG: 325 TABLET ORAL at 08:12

## 2018-07-11 RX ADMIN — ISOSORBIDE MONONITRATE SCH MG: 30 TABLET, EXTENDED RELEASE ORAL at 08:12

## 2018-07-11 RX ADMIN — GABAPENTIN SCH MG: 300 CAPSULE ORAL at 20:15

## 2018-07-11 RX ADMIN — PANTOPRAZOLE SODIUM SCH MG: 40 TABLET, DELAYED RELEASE ORAL at 08:12

## 2018-07-11 RX ADMIN — Medication SCH PATCH: at 08:12

## 2018-07-11 RX ADMIN — HYDROCODONE BITARTRATE AND ACETAMINOPHEN PRN TAB: 10; 325 TABLET ORAL at 21:40

## 2018-07-11 RX ADMIN — ENOXAPARIN SODIUM SCH MG: 30 INJECTION SUBCUTANEOUS at 17:26

## 2018-07-11 RX ADMIN — AMIODARONE HYDROCHLORIDE SCH MG: 200 TABLET ORAL at 08:13

## 2018-07-11 RX ADMIN — SERTRALINE HYDROCHLORIDE SCH MG: 100 TABLET ORAL at 08:12

## 2018-07-11 RX ADMIN — DOXEPIN HYDROCHLORIDE SCH MG: 10 CAPSULE ORAL at 08:12

## 2018-07-11 RX ADMIN — Medication SCH TAB: at 08:12

## 2018-07-11 RX ADMIN — Medication SCH ML: at 20:15

## 2018-07-11 RX ADMIN — Medication SCH ML: at 08:14

## 2018-07-11 RX ADMIN — TRAMADOL HYDROCHLORIDE PRN MG: 50 TABLET, COATED ORAL at 08:13

## 2018-07-11 RX ADMIN — NIFEDIPINE SCH MG: 30 TABLET, FILM COATED, EXTENDED RELEASE ORAL at 20:15

## 2018-07-11 RX ADMIN — HYDROCODONE BITARTRATE AND ACETAMINOPHEN PRN TAB: 10; 325 TABLET ORAL at 10:04

## 2018-07-11 RX ADMIN — TRAMADOL HYDROCHLORIDE PRN MG: 50 TABLET, COATED ORAL at 18:02

## 2018-07-11 RX ADMIN — MONTELUKAST SODIUM SCH MG: 10 TABLET, FILM COATED ORAL at 08:12

## 2018-07-11 RX ADMIN — TRAMADOL HYDROCHLORIDE PRN MG: 50 TABLET, COATED ORAL at 12:16

## 2018-07-11 NOTE — FAST
ENCOUNTER DATE AND TIME:



07/11/2018 08:00 (CDT)



NAME



ESPERANZA CONLEY



YOB: 1935



DATE OF ADMISSION:



07/05/2018 15:57 (CDT)



PHONE:



[phone]



AGE:



83



N#







GENDER:



Female



ENCOUNTER PHYSICIAN:



Dr. Moris Gonzalez M.D.



ADMISSION DIAGNOSIS:



- Orthopaedic Disorders 08 -  Femur (Shaft) Fracture (08.2)

Left Femoral Neck Fracture. 



EATING:



Activity did not occur on this shift



EATING - SCORE:



0-UNK  



GROOMING:



Activity did not occur on this shift



GROOMING - SCORE:



0-UNK  



BATHING:



Activity did not occur on this shift



BATHING - SCORE:



0-UNK  



DRESSING - UPPER BODY:



Activity did not occur on this shift

Patient is not dressing in public clothing



ARTICLES SCORE



Total number of steps: 0



DRESSING - UPPER BODY - SCORE:



0-UNK  



DRESSING - LOWER BODY:



Activity did not occur on this shift

Patient is not dressing in public clothing



ARTICLES SCORE



Total number of steps: 0



DRESSING - LOWER BODY - SCORE:



0-UNK  



TOILETING:



Activity did not occur on this shift



TOILETING - SCORE:



0-UNK  



BLADDER MANAGEMENT:



Activity did not occur on this shift



BLADDER MANAGEMENT - SCORE:



7-IND  



BOWEL MANAGEMENT:



Activity did not occur on this shift



BOWEL MANAGEMENT - SCORE:



7-IND  



TRANSFERS: BED, CHAIR, WHEELCHAIR:



TRANSFERS: BED, CHAIR, WHEELCHAIR - STEP 1:



Does the patient require assistance with bed, chair, or wheelchair transfers? Yes.



TRANSFERS: BED, CHAIR, WHEELCHAIR - STEP 2:



Does the patient require the assistance of a helper? Yes.



TRANSFERS: BED, CHAIR, WHEELCHAIR - STEP 3:



How much assistance does the patient require from the helper? Steadying/guiding assistance



TRANSFERS: BED, CHAIR, WHEELCHAIR - SCORE:



4-MIN  



TRANSFERS: TOILET:



Activity did not occur on this shift



TRANSFERS: TOILET - SCORE:



0-UNK  



TRANSFERS: SHOWER:



Activity did not occur on this shift



TRANSFERS: SHOWER - SCORE:



0-UNK  



TRANSFERS: TUB:



Activity did not occur on this shift



TRANSFERS: TUB - SCORE:



0-UNK  



LOCOMOTION: WALK:



Patient walks less than 50 feet



LOCOMOTION: WALK - SCORE:



1-DEP  



LOCOMOTION: WHEELCHAIR:



LOCOMOTION: WHEELCHAIR - STEP 1:



Does the patient need help to go 150 feet in a wheelchair? Yes.



LOCOMOTION: WHEELCHAIR - STEP 2:



How much assistance does the patient need from the helper? Only supervision, cuing, or coaxing



LOCOMOTION: WHEELCHAIR - SCORE:



5-SUP  



LOCOMOTION: STAIRS:



Activity did not occur on this shift



LOCOMOTION: STAIRS - SCORE:



0-UNK  



COMPREHENSION:



COMPREHENSION - SCORE:



0-UNK  



EXPRESSION



EXPRESSION - SCORE:



0-UNK  



SOCIAL INTERACTION:



SOCIAL INTERACTION - SCORE:



0-UNK  



PROBLEM SOLVING:



PROBLEM SOLVING - SCORE:



0-UNK  



MEMORY:



MEMORY - SCORE:



0-UNK  



SIGNATURE PANEL:



The following modified sections: Transfers: Bed, Chair, Wheelchair - Score, Transfers: Toilet - Score
, Locomotion: Walk - Score, Locomotion: Wheelchair - Score, Locomotion: Stairs - Score were [electron
msisy] signed by Forest Roberts PTA on Wed Jul 11 2018 14:26:30 GMT-0500 (Central Daylight Time)

## 2018-07-11 NOTE — R.PN
ENCOUNTER DATE AND TIME:



07/11/2018 16:40 (CDT)



NAME



ESPERANZA CONLEY



YOB: 1935



DATE OF ADMISSION:



07/05/2018 15:57 (CDT)



Left Femoral Neck FractureCHIEF COMPLAINT:



Left hip fracture.



SUBJECTIVE:



Pt denied any Shortness of Breath.

Pt denied any depression.

Ambulated 60' using a hemiwalker with contact guard assistance. Self-propelled wheelchair 200' with s
tandby assistance.



VITAL SIGNS



Temperature: 97.4 F

SBP/DBP: 119/59

Pulse: 72

Resp: 16



MEDICATION ALLERGIES:



CODEINE

CEPHALEXIN



ENVIRONMENTAL ALLERGIES:





None Known

- Substance Allergies

None Known

- Other Allergies

None Known



NURSING:



- Shower

allowing shower

- Skin

care per protocol



PRECAUTIONS:



- Weight Bearing Precaution

WBAT left LE

NWB Left UE

- Fall Precaution

Bed and chair alarm



ACTIVITIES



OOB only with supervision



THERAPIES:



- Occupational Therapy

Evaluate and Treat. 



- Physical Therapy

Evaluate and Treat. 



PHYSICAL EXAM



- Gen

Alert and awake

Lying in bed

No apparent distress

Oriented to: person, time, and place

- Skin

No skin breakdown.



Normacephalic

- Eyes

No abnormalities

- ENMT

No abnormalities

- Neck

No abnormalities

- CVS

RRR

- Chest

Clear

- Abd

Soft

- GI

Non distended



Deferred

- 

No abnormalities

- Ext

Mild postoperative edema in the left lower extremity.

- MSK

4+/5 weakness in left lower extremity

- Neuro

4/5 strength left lower extremities.

- Psych

No abnormalities



ASSESSMENT:



Pt. is a 82 yo Right-handed white female.Her impairment category is Orthopaedic Disorders 08 -  Femur
 (Shaft) Fracture (08.2).Pre-morbidly, Pt. was independent/mod-I in Social Cognition, Self-Care, Loco
motion, Sphincter Control, Transfers Control, and Communication; and she had good Sphincter Control.C
urrently, she has deficits of Balance, Self-Care, Locomotion, Endurance, Safety Awareness, and Transf
ers Control.Pt. is now referred to [facility] for acute in-patient rehabilitat
ion in order to maximize patient's functional independence in activities of daily living, strength, R
OM, and mobility.- Rehab Goal

Patient has realistic goal of being discharged at assistance level 6-Brian to reside at Home with  Fam
russell/Relatives.

MDM/PLAN:



- Diet Type

Continue Regular

- Physical Therapy

 Decreased range of motion - to improve, our physical therapists will perform initial evaluation of p
t's status upon admission and devise an individualized program for increasing patient's Range of Miguel
on.

 Gait dysfunction - to improve, our physical therapists will perform initial evaluation of pt's statu
s upon admission and devise an individualized program for Gait Training, and Wheel Chair mobility

 Inability to transfer - to improve, our physical therapists will perform initial evaluation of pt's 
status upon admission and devise an individualized program for Bed mobility

 Need for home safety evaluation - to improve, our physical therapists will perform initial evaluatio
n of pt's status upon admission and devise an individualized program for Home Evaluation

 Need in caregiver upon discharge - to improve, our physical therapists will perform initial evaluati
on of pt's status upon admission and devise an individualized program for Caregiver Training

 Edema - to improve, our physical therapists will perform initial evaluation of pt's status upon admi
ssion and devise an individualized program for Elevation Training, and Lymphedema Therapy

 New precaution - to improve, our physical therapists will perform initial evaluation of pt's status 
upon admission and devise an individualized program for Patient precaution education

 Poor balance - to improve, our physical therapists will perform initial evaluation of pt's status up
on admission and devise an individualized program for Balance Training

 Poor endurance - to improve, our physical therapists will perform initial evaluation of pt's status 
upon admission and devise an individualized program for Endurance Training

 Weakness - to improve, our physical therapists will perform initial evaluation of pt's status upon a
dmission and devise an individualized program for Aquatic Therapy, Neuromuscular Reeducation, and Str
engthening

 Achieving independence - to improve, our physical therapists will perform initial evaluation of pt's
 status upon admission and devise an individualized program for Community Reintegration Activities

- Diet - Liquid Texture

Continue Regular

- Tube Feed

Continue N/A

- Weight Bearing Precaution

 WBAT left LE

 NWB Left UE

- Fall Precaution

 Bed and chair alarm

- Skin

 care per protocol

- Diet - Solid Texture

Continue Regular

- Shower

 allowing shower

- Occupational Therapy

 ADL deficits - to improve, our occupation therapists will perform initial evaluation of pt's status 
upon admission and devise an individualized program for Bathing, Bed mobility, Community Reintegratio
n, Cooking, Dressing, Eating, Fine Motor Skills, Grooming, Homemaking, Kitchen Mobility, Laundry, Pat
ient Education, Safety Awareness, Splinting - Positioning, Transfers(Toilet, Tub, Shower), and Wheel 
Chair Management

 Need for care giver - to improve, our occupation therapists will perform initial evaluation of pt's 
status upon admission and devise an individualized program for Caregiver Training

 Weakness - to improve, our occupation therapists will perform initial evaluation of pt's status upon
 admission and devise an individualized program for Aquatic Therapy, Balance, Endurance, UE ROM, and 
UE strengthening



FUNCTIONAL STATUS: UPDATED AT WEEKLY TEAM CONFERENCE



- Bladder

Same accident frequency: 7-Ind - No accidents in the past 7 days

- Bowel

Same accident frequency: 7-Ind - No accidents in the past 7 days

- Walking

Same score based on distance walked: 0(N/A)

- Wheelchair

Same score based on distance traveled: 0(N/A)



FUNCTIONAL STATUS:



- Self-Care

A. Eating    sup   

B. Grooming    sup   

C. Bathing    sup   

D. Dressing - Upper     Ben   

E. Dressing - Lower     Ben   

F. Toileting    Ben   

- Sphincter Control

G: Bladder control     Ind   

H: Bowel control     Ind   

- Transfers Control

I. Bed/Chair/Wheelchair     maxA   

J. Toilet    maxA   

K. Tub/Shower    ADNO   

- Locomotion

L. Walk/Wheelchair (C)     maxA   

L. Walk/Wheelchair (W)     maxA   

M. Stairs    ADNO   

- Communication

N. Comprehension (B)     Ind   

O. Expression (B)     Ind   

- Social Cognition

P. Social Interaction    Ind   

Q. Problem Solving    Ind   

R. Memory    Ind   

- Endurance

Fair

- Balance

Fair

- Safety Awareness

Fair



CURRENT FUNC. DEFICITS:



Balance, Self-Care, Locomotion, Endurance, Safety Awareness, and Transfers Control



SIGNATURE PANEL:



Electronically signed by Dr. Moris Gonzalez M.D. on 07/11/2018 at 16:49 (CDT)

## 2018-07-11 NOTE — FAST
ENCOUNTER DATE AND TIME:



07/11/2018 08:00 (CDT)



NAME



ESPERANZA CONLEY



YOB: 1935



DATE OF ADMISSION:



07/05/2018 15:57 (CDT)



PHONE:



[phone]



AGE:



83



N#







GENDER:



Female



ENCOUNTER PHYSICIAN:



Dr. Moris Gonzalez M.D.



ADMISSION DIAGNOSIS:



- Orthopaedic Disorders 08 -  Femur (Shaft) Fracture (08.2)

Left Femoral Neck Fracture. 



EATING:



Activity did not occur on this shift



EATING - SCORE:



0-UNK  



GROOMING:



Activity did not occur on this shift



GROOMING - SCORE:



0-UNK  



BATHING:



Activity did not occur on this shift



BATHING - SCORE:



0-UNK  



DRESSING - UPPER BODY:



Activity did not occur on this shift

Patient is not dressing in public clothing



ARTICLES SCORE



Total number of steps: 0



DRESSING - UPPER BODY - SCORE:



0-UNK  



DRESSING - LOWER BODY:



Activity did not occur on this shift

Patient is not dressing in public clothing



ARTICLES SCORE



Total number of steps: 0



DRESSING - LOWER BODY - SCORE:



0-UNK  



TOILETING:



Activity did not occur on this shift



TOILETING - SCORE:



0-UNK  



BLADDER MANAGEMENT:



Activity did not occur on this shift



BLADDER MANAGEMENT - SCORE:



7-IND  



BOWEL MANAGEMENT:



Activity did not occur on this shift



BOWEL MANAGEMENT - SCORE:



7-IND  



TRANSFERS: BED, CHAIR, WHEELCHAIR:



Activity did not occur on this shift



TRANSFERS: BED, CHAIR, WHEELCHAIR - SCORE:



0-UNK  



TRANSFERS: TOILET:



Activity did not occur on this shift



TRANSFERS: TOILET - SCORE:



0-UNK  



TRANSFERS: SHOWER:



Activity did not occur on this shift



TRANSFERS: SHOWER - SCORE:



0-UNK  



TRANSFERS: TUB:



Activity did not occur on this shift



TRANSFERS: TUB - SCORE:



0-UNK  



LOCOMOTION: WALK:



Activity did not occur on this shift



LOCOMOTION: WALK - SCORE:



0-UNK  



LOCOMOTION: WHEELCHAIR:



Activity did not occur on this shift



LOCOMOTION: WHEELCHAIR - SCORE:



0-UNK  



LOCOMOTION: STAIRS:



Activity did not occur on this shift



LOCOMOTION: STAIRS - SCORE:



0-UNK  



COMPREHENSION:



COMPREHENSION - STEP 1:



Does the patient require help to understand complex and abstract ideas (such as current events, finan
nora, discharge planning, medical issues, relationships, etc)? No.



COMPREHENSION - STEP 2:



Does the patient need extra time, require an assistive device (such as glasses, hearing aids, or an a
ugmentative communication system), OR does s/he have mild difficulty expressing complex and abstract 
ideas (including mild dysarthria or mild word-finding problems)? Yes.



COMPREHENSION - SCORE:



6-ANUJA  



EXPRESSION



EXPRESSION - STEP 1:



Does the patient require help expressing complex and abstract ideas (such as current events, finances
, discharge planning, medical issues, relationships, etc)? No.



EXPRESSION - STEP 2:



Does the patient need extra time, require an assistive device (such as augmentive communication syste
m or a communication board), OR does s/he have mild difficulty expressing complex and abstract ideas 
(including mild dysarthria or mild word-find problems)? Yes.



EXPRESSION - SCORE:



6-ANUJA  



SOCIAL INTERACTION:



SOCIAL INTERACTION - STEP 1:



Does the patient require a helper to interact with others in social and therapeutic situations? No.



SOCIAL INTERACTION - STEP 2:



Does the patient need extra time in social situations, OR does s/he interact with staff, other patien
ts, and family members ONLY in structured environments, OR does s/he require medication for social in
teraction? Yes, patient needs extra time



SOCIAL INTERACTION - SCORE:



6-ANUJA  



PROBLEM SOLVING:



PROBLEM SOLVING - STEP 1:



Does the patient need help to solve complex problems such as managing a checking account or confronti
ng interpersonal problems? Yes.



PROBLEM SOLVING - STEP 2:



Does the patient solve basic routine problems half or more of the time? Yes.



PROBLEM SOLVING - STEP 3:



How often does the patient need help to solve basic routine problems? 10%-24% of the time



PROBLEM SOLVING - SCORE:



4-MIN  



MEMORY:



MEMORY - STEP 1:



Does the patient need help to remember frequently encountered people, daily routines, and executing r
equests? Yes.



MEMORY - STEP 2:



How often does the patient need help to remember frequently encountered people, daily routines, and e
xecuting requests? 10% - 24% of the time



MEMORY - SCORE:



4-MIN  



SIGNATURE PANEL:



The following modified sections: Comprehension - Score, Expression - Score, Social Interaction - Scor
e, Problem Solving - Score, Memory - Score were [electronically] signed by SHO Brooks on Wed J
ul 11 2018 11:38:56 GMT-0500 (Central Daylight Time)

## 2018-07-12 LAB
ALBUMIN SERPL BCP-MCNC: 2 G/DL (ref 3.4–5)
BUN BLD-MCNC: 14 MG/DL (ref 7–18)
GLUCOSE SERPLBLD-MCNC: 89 MG/DL (ref 74–106)
HCT VFR BLD CALC: 28 % (ref 36–45)
LYMPHOCYTES # SPEC AUTO: 1.9 K/UL (ref 0.7–4.9)
MCH RBC QN AUTO: 29.8 PG (ref 27–35)
MCV RBC: 88.7 FL (ref 80–100)
PMV BLD: 7.9 FL (ref 7.6–11.3)
POTASSIUM SERPL-SCNC: 4.3 MMOL/L (ref 3.5–5.1)
PREALB SERPL-MCNC: 11.3 MG/DL (ref 20–40)
RBC # BLD: 3.15 M/UL (ref 3.86–4.86)

## 2018-07-12 RX ADMIN — GABAPENTIN SCH MG: 300 CAPSULE ORAL at 20:14

## 2018-07-12 RX ADMIN — ISOSORBIDE MONONITRATE SCH MG: 30 TABLET, EXTENDED RELEASE ORAL at 08:14

## 2018-07-12 RX ADMIN — ENOXAPARIN SODIUM SCH MG: 30 INJECTION SUBCUTANEOUS at 16:10

## 2018-07-12 RX ADMIN — MELATONIN PRN MG: 3 TAB ORAL at 20:17

## 2018-07-12 RX ADMIN — Medication SCH MG: at 20:13

## 2018-07-12 RX ADMIN — AMIODARONE HYDROCHLORIDE SCH MG: 200 TABLET ORAL at 08:14

## 2018-07-12 RX ADMIN — TRAMADOL HYDROCHLORIDE PRN MG: 50 TABLET, COATED ORAL at 18:36

## 2018-07-12 RX ADMIN — MONTELUKAST SODIUM SCH MG: 10 TABLET, FILM COATED ORAL at 08:14

## 2018-07-12 RX ADMIN — IRON SUPPLEMENT SCH MG: 325 TABLET ORAL at 08:14

## 2018-07-12 RX ADMIN — TRAMADOL HYDROCHLORIDE PRN MG: 50 TABLET, COATED ORAL at 08:20

## 2018-07-12 RX ADMIN — HYDROCODONE BITARTRATE AND ACETAMINOPHEN PRN TAB: 10; 325 TABLET ORAL at 10:23

## 2018-07-12 RX ADMIN — Medication SCH ML: at 08:15

## 2018-07-12 RX ADMIN — Medication SCH ML: at 20:14

## 2018-07-12 RX ADMIN — NIFEDIPINE SCH MG: 30 TABLET, FILM COATED, EXTENDED RELEASE ORAL at 20:14

## 2018-07-12 RX ADMIN — PANTOPRAZOLE SODIUM SCH MG: 40 TABLET, DELAYED RELEASE ORAL at 08:14

## 2018-07-12 RX ADMIN — LEVOTHYROXINE SODIUM SCH MG: 0.1 TABLET ORAL at 05:21

## 2018-07-12 RX ADMIN — BISOPROLOL FUMARATE SCH MG: 5 TABLET, COATED ORAL at 08:15

## 2018-07-12 RX ADMIN — SERTRALINE HYDROCHLORIDE SCH MG: 100 TABLET ORAL at 08:14

## 2018-07-12 RX ADMIN — SENNOSIDES AND DOCUSATE SODIUM SCH TAB: 8.6; 5 TABLET ORAL at 20:13

## 2018-07-12 RX ADMIN — DOXEPIN HYDROCHLORIDE SCH MG: 10 CAPSULE ORAL at 08:14

## 2018-07-12 RX ADMIN — Medication SCH TAB: at 08:14

## 2018-07-12 RX ADMIN — Medication SCH PATCH: at 08:12

## 2018-07-12 RX ADMIN — GABAPENTIN SCH MG: 300 CAPSULE ORAL at 08:14

## 2018-07-12 NOTE — FAST
SHIFT START DATE/TIME:



07/12/2018 07:00 (CDT)



SHIFT END DATE/TIME:



07/12/2018 19:00 (CDT)



NAME



ESPERANZA CONLEY



YOB: 1935



DATE OF ADMISSION:



07/05/2018 15:57 (CDT)



PHONE:



[phone]



AGE:



83



N#







GENDER:



Female



ENCOUNTER PHYSICIAN:



Dr. Moris Gonzalez M.D.



ADMISSION DIAGNOSIS:



- Orthopaedic Disorders 08 -  Femur (Shaft) Fracture (08.2)

Left Femoral Neck Fracture. 



EATING:



EATING - STEP 1:



Does the patient require assistance when eating? Yes.



EATING - STEP 2:



Does the patient require the assistance of a helper? No, patient only requires an assistive device, O
R s/he takes more than reasonable time to eat, OR there is a safety concern, OR s/he requires modifie
d food consistency



EATING - SCORE:



6-ANUJA  



GROOMING:



Comb/brush hair

Oral care

Wash, rinse, and dry face

Wash, rinse, and dry hands



GROOMING - STEP 1:



Does the patient require assistance when grooming? Yes.



GROOMING - STEP 2:



Does the patient require the assistance of a helper? No. The patient only requires an assistive devic
e, OR takes more than reasonable time to groom, OR there is a concern for safety as the patient groom
s



GROOMING - SCORE:



6-ANUJA  



BATHING:



Activity did not occur on this shift



BATHING - SCORE:



0-UNK  



DRESSING - UPPER BODY:



Activity did not occur on this shift



ARTICLES SCORE



Total number of steps: 0



DRESSING - UPPER BODY - SCORE:



0-UNK  



DRESSING - LOWER BODY:



Elastic waist pants (three steps)  

Tied or buckled shoe - Left foot (two steps)  

Tied or buckled shoe - Right foot (two steps)  



ARTICLES SCORE



Total number of steps: 7



DRESSING - LOWER BODY - STEP 1:



Does the patient require help when dressing below the waist? Yes.



DRESSING - LOWER BODY - STEP 2:



Does the patient require the assistance of a helper? Yes.



DRESSING - LOWER BODY - STEP 3:



Does the helper touch the patient while dressing? No.



DRESSING - LOWER BODY - SCORE:



5-SUP  



TOILETING:



TOILETING - STEP 1:



Does the patient require assistance with toileting? Yes.



TOILETING - STEP 2:



Does the patient require the assistance of a helper? Yes.



TOILETING - STEP 3:



How much assistance does the patient require from the helper? Only supervision



TOILETING - SCORE:



5-SUP  



BLADDER MANAGEMENT:



BLADDER MANAGEMENT - STEP 1:



Does the patient control the bladder completely and intentionally without equipment or devices or med
ications, and is always continent? No.



BLADDER MANAGEMENT - STEP 2:



Does the patient require the assistance of a helper? No, patient only requires extra time



BLADDER MANAGEMENT - SCORE:



6-ANUJA  



BLADDER MANAGEMENT - FREQUENCY OF ACCIDENTS:



BLADDER MANAGEMENT(FA) - STEP 1:



How many accidents has the patient had during the current shift? 0



BOWEL MANAGEMENT:



Activity did not occur on this shift



BOWEL MANAGEMENT - SCORE:



7-IND  



BOWEL MANAGEMENT - FREQUENCY OF ACCIDENTS:



BOWEL MANAGEMENT(FA) - STEP 1:



How many accidents has the patient had during the current shift? 0



TRANSFERS: BED, CHAIR, WHEELCHAIR:



TRANSFERS: BED, CHAIR, WHEELCHAIR - STEP 1:



Does the patient require assistance with bed, chair, or wheelchair transfers? Yes.



TRANSFERS: BED, CHAIR, WHEELCHAIR - STEP 2:



Does the patient require the assistance of a helper? Yes.



TRANSFERS: BED, CHAIR, WHEELCHAIR - STEP 3:



How much assistance does the patient require from the helper? Steadying/guiding assistance



TRANSFERS: BED, CHAIR, WHEELCHAIR - SCORE:



4-MIN  



TRANSFERS: TOILET:



TRANSFERS: TOILET - STEP 1:



Does the patient require assistance with toilet transfers? Yes.



TRANSFERS: TOILET - STEP 2:



Does the patient require the assistance of a helper? Yes.



TRANSFERS: TOILET - STEP 3:



How much assistance does the patient require from the helper? Patient performs half or more of the tr
ansferring tasks



TRANSFERS: TOILET - STEP 4:



Does the patient need only incidental help such as contact guard or steadying during toilet transfer?
 Yes.



TRANSFERS: TOILET - SCORE:



4-MIN  



TRANSFERS: SHOWER:



Activity did not occur on this shift



TRANSFERS: SHOWER - SCORE:



0-UNK  



TRANSFERS: TUB:



Activity did not occur on this shift



TRANSFERS: TUB - SCORE:



0-UNK  



LOCOMOTION: WALK:



Activity did not occur on this shift



LOCOMOTION: WALK - SCORE:



0-UNK  



LOCOMOTION: WHEELCHAIR:



LOCOMOTION: WHEELCHAIR - STEP 1:



Does the patient need help to go 150 feet in a wheelchair? Yes.



LOCOMOTION: WHEELCHAIR - STEP 2:



How much assistance does the patient need from the helper? Only supervision, cuing, or coaxing



LOCOMOTION: WHEELCHAIR - SCORE:



5-SUP  



COMPREHENSION:



COMPREHENSION: TYPE:



Both



COMPREHENSION - STEP 1:



Does the patient require help to understand complex and abstract ideas (such as current events, finan
nora, discharge planning, medical issues, relationships, etc)? No.



COMPREHENSION - STEP 2:



Does the patient need extra time, require an assistive device (such as glasses, hearing aids, or an a
ugmentative communication system), OR does s/he have mild difficulty expressing complex and abstract 
ideas (including mild dysarthria or mild word-finding problems)? Yes.



COMPREHENSION - SCORE:



6-ANUJA  



EXPRESSION



EXPRESSION: TYPE:



Both



EXPRESSION - STEP 1:



Does the patient require help expressing complex and abstract ideas (such as current events, finances
, discharge planning, medical issues, relationships, etc)? Yes.



EXPRESSION - STEP 2:



Does the patient require help to express basic necessities or ideas (such as hunger, thirst, sleep, s
afety, daily schedule, room location, or discomfort) half or more of the time? No.



EXPRESSION - STEP 3:



How often does the patient need help to express directions and conversation about basic needs? Less t
mercedes 10% of the time



EXPRESSION - SCORE:



5-SUP  



SOCIAL INTERACTION:



SOCIAL INTERACTION - STEP 1:



Does the patient require a helper to interact with others in social and therapeutic situations? No.



SOCIAL INTERACTION - STEP 2:



Does the patient need extra time in social situations, OR does s/he interact with staff, other patien
ts, and family members ONLY in structured environments, OR does s/he require medication for social in
teraction? No.



SOCIAL INTERACTION - SCORE:



7-IND  



PROBLEM SOLVING:



PROBLEM SOLVING - STEP 1:



Does the patient need help to solve complex problems such as managing a checking account or confronti
ng interpersonal problems? Yes.



PROBLEM SOLVING - STEP 2:



Does the patient solve basic routine problems half or more of the time? Yes.



PROBLEM SOLVING - STEP 3:



How often does the patient need help to solve basic routine problems? Less than 10% of the time



PROBLEM SOLVING - SCORE:



5-SUP  



MEMORY:



MEMORY - STEP 1:



Does the patient need help to remember frequently encountered people, daily routines, and executing r
equests? Yes.



MEMORY - STEP 2:



How often does the patient need help to remember frequently encountered people, daily routines, and e
xecuting requests? Less than 10% of the time



MEMORY - SCORE:



5-SUP  



SIGNATURE PANEL:



The following modified sections: Eating - Score, Grooming - Score, Bathing - Score, Dressing - Upper 
Body - Score, Dressing - Lower Body - Score, Toileting - Score, Bladder Management - Score, Bowel Man
agement - Score, Transfers: Bed, Chair, Wheelchair - Score, Transfers: Toilet - Score, Transfers: Fabi
wer - Score, Transfers: Tub - Score, Locomotion: Walk - Score, Locomotion: Wheelchair - Score, Compre
hension - Score, Expression - Score, Social Interaction - Score, Problem Solving - Score, Memory - Sc
ore were [electronically] signed by Kat Kenny C.N.A. on Thu Jul 12 2018 14:28:14 T-0500 (Centra
l Daylight Time)

## 2018-07-12 NOTE — FAST
ENCOUNTER DATE AND TIME:



07/12/2018 08:00 (CDT)



NAME



ESPERANZA CONLEY



YOB: 1935



DATE OF ADMISSION:



07/05/2018 15:57 (CDT)



PHONE:



[phone]



AGE:



83



N#







GENDER:



Female



ENCOUNTER PHYSICIAN:



Dr. Moris Gonzalez M.D.



ADMISSION DIAGNOSIS:



- Orthopaedic Disorders 08 -  Femur (Shaft) Fracture (08.2)

Left Femoral Neck Fracture. 



EATING:



Activity did not occur on this shift



EATING - SCORE:



0-UNK  



GROOMING:



Activity did not occur on this shift



GROOMING - SCORE:



0-UNK  



BATHING:



Activity did not occur on this shift



BATHING - SCORE:



0-UNK  



DRESSING - UPPER BODY:



Activity did not occur on this shift

Patient is not dressing in public clothing



ARTICLES SCORE



Total number of steps: 0



DRESSING - UPPER BODY - SCORE:



0-UNK  



DRESSING - LOWER BODY:



Activity did not occur on this shift

Patient is not dressing in public clothing



ARTICLES SCORE



Total number of steps: 0



DRESSING - LOWER BODY - SCORE:



0-UNK  



TOILETING:



Activity did not occur on this shift



TOILETING - SCORE:



0-UNK  



BLADDER MANAGEMENT:



Activity did not occur on this shift



BLADDER MANAGEMENT - SCORE:



7-IND  



BOWEL MANAGEMENT:



Activity did not occur on this shift



BOWEL MANAGEMENT - SCORE:



7-IND  



TRANSFERS: BED, CHAIR, WHEELCHAIR:



TRANSFERS: BED, CHAIR, WHEELCHAIR - STEP 1:



Does the patient require assistance with bed, chair, or wheelchair transfers? Yes.



TRANSFERS: BED, CHAIR, WHEELCHAIR - STEP 2:



Does the patient require the assistance of a helper? Yes.



TRANSFERS: BED, CHAIR, WHEELCHAIR - STEP 3:



How much assistance does the patient require from the helper? Steadying/guiding assistance



TRANSFERS: BED, CHAIR, WHEELCHAIR - SCORE:



4-MIN  



TRANSFERS: TOILET:



Activity did not occur on this shift



TRANSFERS: TOILET - SCORE:



0-UNK  



TRANSFERS: SHOWER:



Activity did not occur on this shift



TRANSFERS: SHOWER - SCORE:



0-UNK  



TRANSFERS: TUB:



Activity did not occur on this shift



TRANSFERS: TUB - SCORE:



0-UNK  



LOCOMOTION: WALK:



LOCOMOTION: WALK - STEP 1:



Does the patient need help to walk 150 feet? Yes.



LOCOMOTION: WALK - STEP 2:



How much assistance does the patient require to walk a minimum of 150 feet? Patient walks less than 1
50 feet - but more than 50 feet - with the assistance of only one helper



LOCOMOTION: WALK - SCORE:



2-MAX  



LOCOMOTION: WHEELCHAIR:



LOCOMOTION: WHEELCHAIR - STEP 1:



Does the patient need help to go 150 feet in a wheelchair? Yes.



LOCOMOTION: WHEELCHAIR - STEP 2:



How much assistance does the patient need from the helper? Only supervision, cuing, or coaxing



LOCOMOTION: WHEELCHAIR - SCORE:



5-SUP  



LOCOMOTION: STAIRS:



Activity did not occur on this shift



LOCOMOTION: STAIRS - SCORE:



0-UNK  



COMPREHENSION:



COMPREHENSION - SCORE:



0-UNK  



EXPRESSION



EXPRESSION - SCORE:



0-UNK  



SOCIAL INTERACTION:



SOCIAL INTERACTION - SCORE:



0-UNK  



PROBLEM SOLVING:



PROBLEM SOLVING - SCORE:



0-UNK  



MEMORY:



MEMORY - SCORE:



0-UNK  



SIGNATURE PANEL:



The following modified sections: Transfers: Bed, Chair, Wheelchair - Score, Transfers: Toilet - Score
, Locomotion: Walk - Score, Locomotion: Wheelchair - Score, Locomotion: Stairs - Score were [electron
icawillis] signed by Forest Roberts PTA on Thu Jul 12 2018 14:53:45 GMT-0500 (Central Daylight Time)

## 2018-07-12 NOTE — R.PN
ENCOUNTER DATE AND TIME:



07/12/2018 18:23 (CDT)



NAME



ESPERANZA CONLEY



YOB: 1935



DATE OF ADMISSION:



07/05/2018 15:57 (CDT)



Left Femoral Neck FractureCHIEF COMPLAINT:



Left hip fracture.



SUBJECTIVE:



Pt denied any Shortness of Breath.

Pt denied any depression.

Ambulated 105' using a hemiwalker with contact guard assistance. Self-propelled wheelchair 250' with 
standby assistance.



VITAL SIGNS



Temperature: 97.4 F

SBP/DBP: 149/65

Pulse: 75

Resp: 16



MEDICATION ALLERGIES:



CODEINE

CEPHALEXIN



ENVIRONMENTAL ALLERGIES:





None Known

- Substance Allergies

None Known

- Other Allergies

None Known



NURSING:



- Shower

allowing shower

- Skin

care per protocol



PRECAUTIONS:



- Weight Bearing Precaution

WBAT left LE

NWB Left UE

- Fall Precaution

Bed and chair alarm



ACTIVITIES



OOB only with supervision



THERAPIES:



- Occupational Therapy

Evaluate and Treat. 



- Physical Therapy

Evaluate and Treat. 



PHYSICAL EXAM



- Gen

Alert and awake

Lying in bed

No apparent distress

Oriented to: person, time, and place

- Skin

No skin breakdown.



Normacephalic

- Eyes

No abnormalities

- ENMT

No abnormalities

- Neck

No abnormalities

- CVS

RRR

- Chest

Clear

- Abd

Soft

- GI

Non distended



Deferred

- 

No abnormalities

- Ext

Mild postoperative edema in the left lower extremity.

- MSK

4+/5 weakness in left lower extremity

- Neuro

4/5 strength left lower extremities.

- Psych

No abnormalities



ASSESSMENT:



Pt. is a 84 yo Right-handed white female.Her impairment category is Orthopaedic Disorders 08 -  Femur
 (Shaft) Fracture (08.2).Pre-morbidly, Pt. was independent/mod-I in Social Cognition, Self-Care, Loco
motion, Sphincter Control, Transfers Control, and Communication; and she had good Sphincter Control.C
urrently, she has deficits of Balance, Self-Care, Locomotion, Endurance, Safety Awareness, and Transf
ers Control.Pt. is now referred to [facility] for acute in-patient rehabilitat
ion in order to maximize patient's functional independence in activities of daily living, strength, R
OM, and mobility.- Rehab Goal

Patient has realistic goal of being discharged at assistance level 6-Brian to reside at Home with  Fam
russell/Relatives.

MDM/PLAN:



- Diet Type

Continue Regular

- Physical Therapy

 Decreased range of motion - to improve, our physical therapists will perform initial evaluation of p
t's status upon admission and devise an individualized program for increasing patient's Range of Miguel
on.

 Gait dysfunction - to improve, our physical therapists will perform initial evaluation of pt's statu
s upon admission and devise an individualized program for Gait Training, and Wheel Chair mobility

 Inability to transfer - to improve, our physical therapists will perform initial evaluation of pt's 
status upon admission and devise an individualized program for Bed mobility

 Need for home safety evaluation - to improve, our physical therapists will perform initial evaluatio
n of pt's status upon admission and devise an individualized program for Home Evaluation

 Need in caregiver upon discharge - to improve, our physical therapists will perform initial evaluati
on of pt's status upon admission and devise an individualized program for Caregiver Training

 Edema - to improve, our physical therapists will perform initial evaluation of pt's status upon admi
ssion and devise an individualized program for Elevation Training, and Lymphedema Therapy

 New precaution - to improve, our physical therapists will perform initial evaluation of pt's status 
upon admission and devise an individualized program for Patient precaution education

 Poor balance - to improve, our physical therapists will perform initial evaluation of pt's status up
on admission and devise an individualized program for Balance Training

 Poor endurance - to improve, our physical therapists will perform initial evaluation of pt's status 
upon admission and devise an individualized program for Endurance Training

 Weakness - to improve, our physical therapists will perform initial evaluation of pt's status upon a
dmission and devise an individualized program for Aquatic Therapy, Neuromuscular Reeducation, and Str
engthening

 Achieving independence - to improve, our physical therapists will perform initial evaluation of pt's
 status upon admission and devise an individualized program for Community Reintegration Activities

- Diet - Liquid Texture

Continue Regular

- Tube Feed

Continue N/A

- Weight Bearing Precaution

 WBAT left LE

 NWB Left UE

- Fall Precaution

 Bed and chair alarm

- Skin

 care per protocol

- Diet - Solid Texture

Continue Regular

- Shower

 allowing shower

- Occupational Therapy

 ADL deficits - to improve, our occupation therapists will perform initial evaluation of pt's status 
upon admission and devise an individualized program for Bathing, Bed mobility, Community Reintegratio
n, Cooking, Dressing, Eating, Fine Motor Skills, Grooming, Homemaking, Kitchen Mobility, Laundry, Pat
ient Education, Safety Awareness, Splinting - Positioning, Transfers(Toilet, Tub, Shower), and Wheel 
Chair Management

 Need for care giver - to improve, our occupation therapists will perform initial evaluation of pt's 
status upon admission and devise an individualized program for Caregiver Training

 Weakness - to improve, our occupation therapists will perform initial evaluation of pt's status upon
 admission and devise an individualized program for Aquatic Therapy, Balance, Endurance, UE ROM, and 
UE strengthening



FUNCTIONAL STATUS: UPDATED AT WEEKLY TEAM CONFERENCE



- Bladder

Same accident frequency: 7-Ind - No accidents in the past 7 days

- Bowel

Same accident frequency: 7-Ind - No accidents in the past 7 days

- Walking

Same score based on distance walked: 0(N/A)

- Wheelchair

Same score based on distance traveled: 0(N/A)



FUNCTIONAL STATUS:



- Self-Care

A. Eating    sup   

B. Grooming    sup   

C. Bathing    sup   

D. Dressing - Upper     Ben   

E. Dressing - Lower     Ben   

F. Toileting    Ben   

- Sphincter Control

G: Bladder control     Ind   

H: Bowel control     Ind   

- Transfers Control

I. Bed/Chair/Wheelchair     maxA   

J. Toilet    maxA   

K. Tub/Shower    ADNO   

- Locomotion

L. Walk/Wheelchair (C)     maxA   

L. Walk/Wheelchair (W)     maxA   

M. Stairs    ADNO   

- Communication

N. Comprehension (B)     Ind   

O. Expression (B)     Ind   

- Social Cognition

P. Social Interaction    Ind   

Q. Problem Solving    Ind   

R. Memory    Ind   

- Endurance

Fair

- Balance

Fair

- Safety Awareness

Fair



[facility]:



Balance, Self-Care, Locomotion, Endurance, Safety Awareness, and Transfers Control



SIGNATURE PANEL:



Electronically signed by Dr. Moris Gonzalez M.D. on 07/12/2018 at 18:25 (CDT)

## 2018-07-12 NOTE — FAST
SHIFT START DATE/TIME:



07/11/2018 19:00 (CDT)



SHIFT END DATE/TIME:



07/12/2018 07:00 (CDT)



NAME



ESPERANZA CONLEY



YOB: 1935



DATE OF ADMISSION:



07/05/2018 15:57 (CDT)



PHONE:



[phone]



AGE:



83



N#







GENDER:



Female



ENCOUNTER PHYSICIAN:



Dr. Moris Gonzalez M.D.



ADMISSION DIAGNOSIS:



- Orthopaedic Disorders 08 -  Femur (Shaft) Fracture (08.2)

Left Femoral Neck Fracture. 



EATING:



Activity did not occur on this shift



EATING - SCORE:



0-UNK  



GROOMING:



Activity did not occur on this shift



GROOMING - SCORE:



0-UNK  



BATHING:



Activity did not occur on this shift



BATHING - SCORE:



0-UNK  



DRESSING - UPPER BODY:



Activity did not occur on this shift



ARTICLES SCORE



Total number of steps: 0



DRESSING - UPPER BODY - SCORE:



0-UNK  



DRESSING - LOWER BODY:



Activity did not occur on this shift



ARTICLES SCORE



Total number of steps: 0



DRESSING - LOWER BODY - SCORE:



0-UNK  



TOILETING:



TOILETING - STEP 1:



Does the patient require assistance with toileting? No.



TOILETING - SCORE:



7-IND  



BLADDER MANAGEMENT:



Activity did not occur on this shift



BLADDER MANAGEMENT - SCORE:



7-IND  



BOWEL MANAGEMENT:



Activity did not occur on this shift



BOWEL MANAGEMENT - SCORE:



7-IND  



TRANSFERS: BED, CHAIR, WHEELCHAIR:



TRANSFERS: BED, CHAIR, WHEELCHAIR - STEP 1:



Does the patient require assistance with bed, chair, or wheelchair transfers? No.



TRANSFERS: BED, CHAIR, WHEELCHAIR - SCORE:



7-IND  



TRANSFERS: TOILET:



TRANSFERS: TOILET - STEP 1:



Does the patient require assistance with toilet transfers? No.



TRANSFERS: TOILET - SCORE:



7-IND  



TRANSFERS: SHOWER:



Activity did not occur on this shift



TRANSFERS: SHOWER - SCORE:



0-UNK  



TRANSFERS: TUB:



Activity did not occur on this shift



TRANSFERS: TUB - SCORE:



0-UNK  



LOCOMOTION: WALK:



Activity did not occur on this shift



LOCOMOTION: WALK - SCORE:



0-UNK  



LOCOMOTION: WHEELCHAIR:



Activity did not occur on this shift



LOCOMOTION: WHEELCHAIR - SCORE:



0-UNK  



COMPREHENSION:



COMPREHENSION - SCORE:



0-UNK  



EXPRESSION



EXPRESSION - SCORE:



0-UNK  



SOCIAL INTERACTION:



SOCIAL INTERACTION - SCORE:



0-UNK  



PROBLEM SOLVING:



PROBLEM SOLVING - SCORE:



0-UNK  



MEMORY:



MEMORY - SCORE:



0-UNK  



SIGNATURE PANEL:



The following modified sections: Eating - Score, Grooming - Score, Bathing - Score, Dressing - Upper 
Body - Score, Dressing - Lower Body - Score, Toileting - Score, Bladder Management - Score, Bowel Man
agement - Score, Transfers: Bed, Chair, Wheelchair - Score, Transfers: Toilet - Score, Transfers: Fabi
wer - Score, Transfers: Tub - Score, Locomotion: Walk - Score, Locomotion: Wheelchair - Score, Compre
hension - Score, Expression - Score, Social Interaction - Score, Problem Solving - Score, Memory - Sc
ore were [electronically] signed by Chetna Barrera on Thu Jul 12 2018 02:28:54 GMT-0500 (Central Day
light Time)

## 2018-07-12 NOTE — FAST
ENCOUNTER DATE AND TIME:



07/12/2018 08:00 (CDT)



NAME



ESPERANZA CONLEY



YOB: 1935



DATE OF ADMISSION:



07/05/2018 15:57 (CDT)



PHONE:



[phone]



AGE:



83



N#







GENDER:



Female



ENCOUNTER PHYSICIAN:



Dr. Moris Gonzalez M.D.



ADMISSION DIAGNOSIS:



- Orthopaedic Disorders 08 -  Femur (Shaft) Fracture (08.2)

Left Femoral Neck Fracture. 



EATING:



Activity did not occur on this shift



EATING - SCORE:



0-UNK  



GROOMING:



Wash, rinse, and dry hands



GROOMING - STEP 1:



Does the patient require assistance when grooming? Yes.



GROOMING - STEP 2:



Does the patient require the assistance of a helper? Yes.



GROOMING - STEP 3:



How much assistance does the patient require from the helper? Only prior equipment preparation/set up
 from the helper



GROOMING - SCORE:



5-SUP  



BATHING:



Activity did not occur on this shift



BATHING - SCORE:



0-UNK  



DRESSING - UPPER BODY:



Activity did not occur on this shift



ARTICLES SCORE



Total number of steps: 0



DRESSING - UPPER BODY - SCORE:



0-UNK  



DRESSING - LOWER BODY:



Activity did not occur on this shift



ARTICLES SCORE



Total number of steps: 0



DRESSING - LOWER BODY - SCORE:



0-UNK  



TOILETING:



TOILETING - STEP 1:



Does the patient require assistance with toileting? Yes.



TOILETING - STEP 2:



Does the patient require the assistance of a helper? Yes.



TOILETING - STEP 3:



How much assistance does the patient require from the helper? Hands-on assistance from the helper



TOILETING - STEP 4:



Of the 3 tasks: 1) Adjusting clothing prior to use, 2) Cleansing of perineal area,  3) Adjusting clot
mary after use; How many tasks does the patient perform WITHOUT assistance of the helper? Two tasks



TOILETING - SCORE:



3-MOD  



BLADDER MANAGEMENT:



Activity did not occur on this shift



BLADDER MANAGEMENT - SCORE:



7-IND  



BOWEL MANAGEMENT:



Activity did not occur on this shift



BOWEL MANAGEMENT - SCORE:



7-IND  



TRANSFERS: BED, CHAIR, WHEELCHAIR:



Activity did not occur on this shift



TRANSFERS: BED, CHAIR, WHEELCHAIR - SCORE:



0-UNK  



TRANSFERS: TOILET:



TRANSFERS: TOILET - STEP 1:



Does the patient require assistance with toilet transfers? Yes.



TRANSFERS: TOILET - STEP 2:



Does the patient require the assistance of a helper? Yes.



TRANSFERS: TOILET - STEP 3:



How much assistance does the patient require from the helper? Patient performs half or more of the tr
ansferring tasks



TRANSFERS: TOILET - STEP 4:



Does the patient need only incidental help such as contact guard or steadying during toilet transfer?
 Yes.



TRANSFERS: TOILET - SCORE:



4-MIN  



TRANSFERS: SHOWER:



Activity did not occur on this shift



TRANSFERS: SHOWER - SCORE:



0-UNK  



TRANSFERS: TUB:



Activity did not occur on this shift



TRANSFERS: TUB - SCORE:



0-UNK  



LOCOMOTION: WALK:



Activity did not occur on this shift



LOCOMOTION: WALK - SCORE:



0-UNK  



LOCOMOTION: WHEELCHAIR:



Activity did not occur on this shift



LOCOMOTION: WHEELCHAIR - SCORE:



0-UNK  



LOCOMOTION: STAIRS:



Activity did not occur on this shift



LOCOMOTION: STAIRS - SCORE:



0-UNK  



COMPREHENSION:



COMPREHENSION: TYPE:



Auditory



COMPREHENSION - STEP 1:



Does the patient require help to understand complex and abstract ideas (such as current events, finan
nora, discharge planning, medical issues, relationships, etc)? No.



COMPREHENSION - STEP 2:



Does the patient need extra time, require an assistive device (such as glasses, hearing aids, or an a
ugmentative communication system), OR does s/he have mild difficulty expressing complex and abstract 
ideas (including mild dysarthria or mild word-finding problems)? Yes.



COMPREHENSION - SCORE:



6-ANUJA  



EXPRESSION



EXPRESSION: TYPE:



Non-Vocal



EXPRESSION - STEP 1:



Does the patient require help expressing complex and abstract ideas (such as current events, finances
, discharge planning, medical issues, relationships, etc)? No.



EXPRESSION - STEP 2:



Does the patient need extra time, require an assistive device (such as augmentive communication syste
m or a communication board), OR does s/he have mild difficulty expressing complex and abstract ideas 
(including mild dysarthria or mild word-find problems)? No.



EXPRESSION - SCORE:



7-IND  



SOCIAL INTERACTION:



SOCIAL INTERACTION - STEP 1:



Does the patient require a helper to interact with others in social and therapeutic situations? No.



SOCIAL INTERACTION - STEP 2:



Does the patient need extra time in social situations, OR does s/he interact with staff, other patien
ts, and family members ONLY in structured environments, OR does s/he require medication for social in
teraction? No.



SOCIAL INTERACTION - SCORE:



7-IND  



PROBLEM SOLVING:



PROBLEM SOLVING - STEP 1:



Does the patient need help to solve complex problems such as managing a checking account or confronti
ng interpersonal problems? Yes.



PROBLEM SOLVING - STEP 2:



Does the patient solve basic routine problems half or more of the time? Yes.



PROBLEM SOLVING - STEP 3:



How often does the patient need help to solve basic routine problems? Less than 10% of the time



PROBLEM SOLVING - SCORE:



5-SUP  



MEMORY:



MEMORY - STEP 1:



Does the patient need help to remember frequently encountered people, daily routines, and executing r
equests? Yes.



MEMORY - STEP 2:



How often does the patient need help to remember frequently encountered people, daily routines, and e
xecuting requests? Less than 10% of the time



MEMORY - SCORE:



5-SUP  



SIGNATURE PANEL:



The following modified sections: Eating - Score, Grooming - Score, Bathing - Score, Dressing - Upper 
Body - Score, Dressing - Lower Body - Score, Toileting - Score, Transfers: Bed, Chair, Wheelchair - S
core, Transfers: Toilet - Score, Transfers: Shower - Score, Transfers: Tub - Score, Comprehension - S
core, Expression - Score, Social Interaction - Score, Problem Solving - Score, Memory - Score were [e
lectronically] signed by AKASH Harvey on Thu Jul 12 2018 12:16:09 T-0500 (Central Daylig
ht Time)

## 2018-07-12 NOTE — FAST
ENCOUNTER DATE AND TIME:



07/12/2018 08:00 (CDT)



NAME



ESPERANZA CONLEY



YOB: 1935



DATE OF ADMISSION:



07/05/2018 15:57 (CDT)



PHONE:



[phone]



AGE:



83



N#







GENDER:



Female



ENCOUNTER PHYSICIAN:



Dr. Moris Gonzalez M.D.



ADMISSION DIAGNOSIS:



- Orthopaedic Disorders 08 -  Femur (Shaft) Fracture (08.2)

Left Femoral Neck Fracture. 



EATING:



Activity did not occur on this shift



EATING - SCORE:



0-UNK  



GROOMING:



Activity did not occur on this shift



GROOMING - SCORE:



0-UNK  



BATHING:



Activity did not occur on this shift



BATHING - SCORE:



0-UNK  



DRESSING - UPPER BODY:



Activity did not occur on this shift

Patient is not dressing in public clothing



ARTICLES SCORE



Total number of steps: 0



DRESSING - UPPER BODY - SCORE:



0-UNK  



DRESSING - LOWER BODY:



Activity did not occur on this shift

Patient is not dressing in public clothing



ARTICLES SCORE



Total number of steps: 0



DRESSING - LOWER BODY - SCORE:



0-UNK  



TOILETING:



Activity did not occur on this shift



TOILETING - SCORE:



0-UNK  



BLADDER MANAGEMENT:



Activity did not occur on this shift



BLADDER MANAGEMENT - SCORE:



7-IND  



BOWEL MANAGEMENT:



Activity did not occur on this shift



BOWEL MANAGEMENT - SCORE:



7-IND  



TRANSFERS: BED, CHAIR, WHEELCHAIR:



Activity did not occur on this shift



TRANSFERS: BED, CHAIR, WHEELCHAIR - SCORE:



0-UNK  



TRANSFERS: TOILET:



Activity did not occur on this shift



TRANSFERS: TOILET - SCORE:



0-UNK  



TRANSFERS: SHOWER:



Activity did not occur on this shift



TRANSFERS: SHOWER - SCORE:



0-UNK  



TRANSFERS: TUB:



Activity did not occur on this shift



TRANSFERS: TUB - SCORE:



0-UNK  



LOCOMOTION: WALK:



Activity did not occur on this shift



LOCOMOTION: WALK - SCORE:



0-UNK  



LOCOMOTION: WHEELCHAIR:



Activity did not occur on this shift



LOCOMOTION: WHEELCHAIR - SCORE:



0-UNK  



LOCOMOTION: STAIRS:



Activity did not occur on this shift



LOCOMOTION: STAIRS - SCORE:



0-UNK  



COMPREHENSION:



COMPREHENSION - STEP 1:



Does the patient require help to understand complex and abstract ideas (such as current events, finan
nora, discharge planning, medical issues, relationships, etc)? No.



COMPREHENSION - STEP 2:



Does the patient need extra time, require an assistive device (such as glasses, hearing aids, or an a
ugmentative communication system), OR does s/he have mild difficulty expressing complex and abstract 
ideas (including mild dysarthria or mild word-finding problems)? Yes.



COMPREHENSION - SCORE:



6-AUNJA  



EXPRESSION



EXPRESSION - STEP 1:



Does the patient require help expressing complex and abstract ideas (such as current events, finances
, discharge planning, medical issues, relationships, etc)? No.



EXPRESSION - STEP 2:



Does the patient need extra time, require an assistive device (such as augmentive communication syste
m or a communication board), OR does s/he have mild difficulty expressing complex and abstract ideas 
(including mild dysarthria or mild word-find problems)? Yes.



EXPRESSION - SCORE:



6-ANUJA  



SOCIAL INTERACTION:



SOCIAL INTERACTION - STEP 1:



Does the patient require a helper to interact with others in social and therapeutic situations? No.



SOCIAL INTERACTION - STEP 2:



Does the patient need extra time in social situations, OR does s/he interact with staff, other patien
ts, and family members ONLY in structured environments, OR does s/he require medication for social in
teraction? Yes, patient needs extra time



SOCIAL INTERACTION - SCORE:



6-ANUJA  



PROBLEM SOLVING:



PROBLEM SOLVING - STEP 1:



Does the patient need help to solve complex problems such as managing a checking account or confronti
ng interpersonal problems? Yes.



PROBLEM SOLVING - STEP 2:



Does the patient solve basic routine problems half or more of the time? Yes.



PROBLEM SOLVING - STEP 3:



How often does the patient need help to solve basic routine problems? 10%-24% of the time



PROBLEM SOLVING - SCORE:



4-MIN  



MEMORY:



MEMORY - STEP 1:



Does the patient need help to remember frequently encountered people, daily routines, and executing r
equests? Yes.



MEMORY - STEP 2:



How often does the patient need help to remember frequently encountered people, daily routines, and e
xecuting requests? 25% - 49% of the time



MEMORY - SCORE:



3-MOD  



SIGNATURE PANEL:



The following modified sections: Comprehension - Score, Expression - Score, Social Interaction - Scor
e, Problem Solving - Score, Memory - Score were [electronically] signed by SHO Brooks on Thu J
ul 12 2018 15:00:52 GMT-0500 (Central Daylight Time)

## 2018-07-13 RX ADMIN — IRON SUPPLEMENT SCH MG: 325 TABLET ORAL at 07:59

## 2018-07-13 RX ADMIN — SERTRALINE HYDROCHLORIDE SCH MG: 100 TABLET ORAL at 07:58

## 2018-07-13 RX ADMIN — Medication SCH TAB: at 07:59

## 2018-07-13 RX ADMIN — ISOSORBIDE MONONITRATE SCH MG: 30 TABLET, EXTENDED RELEASE ORAL at 07:58

## 2018-07-13 RX ADMIN — TRAMADOL HYDROCHLORIDE PRN MG: 50 TABLET, COATED ORAL at 07:59

## 2018-07-13 RX ADMIN — Medication SCH ML: at 19:26

## 2018-07-13 RX ADMIN — Medication SCH MG: at 19:21

## 2018-07-13 RX ADMIN — GABAPENTIN SCH MG: 300 CAPSULE ORAL at 19:22

## 2018-07-13 RX ADMIN — DOXEPIN HYDROCHLORIDE SCH MG: 10 CAPSULE ORAL at 07:58

## 2018-07-13 RX ADMIN — BISOPROLOL FUMARATE SCH MG: 5 TABLET, COATED ORAL at 07:59

## 2018-07-13 RX ADMIN — Medication SCH ML: at 08:00

## 2018-07-13 RX ADMIN — HYDROCODONE BITARTRATE AND ACETAMINOPHEN PRN TAB: 10; 325 TABLET ORAL at 12:59

## 2018-07-13 RX ADMIN — ENOXAPARIN SODIUM SCH MG: 30 INJECTION SUBCUTANEOUS at 16:20

## 2018-07-13 RX ADMIN — MONTELUKAST SODIUM SCH MG: 10 TABLET, FILM COATED ORAL at 07:58

## 2018-07-13 RX ADMIN — Medication SCH MG: at 07:58

## 2018-07-13 RX ADMIN — AMIODARONE HYDROCHLORIDE SCH MG: 200 TABLET ORAL at 07:59

## 2018-07-13 RX ADMIN — Medication SCH ML: at 20:48

## 2018-07-13 RX ADMIN — MELATONIN PRN MG: 3 TAB ORAL at 21:36

## 2018-07-13 RX ADMIN — HYDROCODONE BITARTRATE AND ACETAMINOPHEN PRN TAB: 10; 325 TABLET ORAL at 19:22

## 2018-07-13 RX ADMIN — TRAMADOL HYDROCHLORIDE PRN MG: 50 TABLET, COATED ORAL at 16:47

## 2018-07-13 RX ADMIN — NIFEDIPINE SCH MG: 30 TABLET, FILM COATED, EXTENDED RELEASE ORAL at 20:49

## 2018-07-13 RX ADMIN — PANTOPRAZOLE SODIUM SCH MG: 40 TABLET, DELAYED RELEASE ORAL at 07:59

## 2018-07-13 RX ADMIN — Medication SCH: at 20:00

## 2018-07-13 RX ADMIN — SENNOSIDES AND DOCUSATE SODIUM SCH TAB: 8.6; 5 TABLET ORAL at 20:51

## 2018-07-13 RX ADMIN — GABAPENTIN SCH MG: 300 CAPSULE ORAL at 07:59

## 2018-07-13 RX ADMIN — HYDROCODONE BITARTRATE AND ACETAMINOPHEN PRN TAB: 10; 325 TABLET ORAL at 05:16

## 2018-07-13 RX ADMIN — LEVOTHYROXINE SODIUM SCH MG: 0.1 TABLET ORAL at 05:16

## 2018-07-13 RX ADMIN — Medication SCH PATCH: at 07:57

## 2018-07-13 NOTE — FAST
ENCOUNTER DATE AND TIME:



07/13/2018 08:00 (CDT)



NAME



ESPERANZA CONLEY



YOB: 1935



DATE OF ADMISSION:



07/05/2018 15:57 (CDT)



PHONE:



[phone]



AGE:



83



N#







GENDER:



Female



ENCOUNTER PHYSICIAN:



Dr. Moris Gonzalez M.D.



ADMISSION DIAGNOSIS:



- Orthopaedic Disorders 08 -  Femur (Shaft) Fracture (08.2)

Left Femoral Neck Fracture. 



EATING:



Activity did not occur on this shift



EATING - SCORE:



0-UNK  



GROOMING:



Activity did not occur on this shift



GROOMING - SCORE:



0-UNK  



BATHING:



Activity did not occur on this shift



BATHING - SCORE:



0-UNK  



DRESSING - UPPER BODY:



Activity did not occur on this shift

Patient is not dressing in public clothing



ARTICLES SCORE



Total number of steps: 0



DRESSING - UPPER BODY - SCORE:



0-UNK  



DRESSING - LOWER BODY:



Activity did not occur on this shift

Patient is not dressing in public clothing



ARTICLES SCORE



Total number of steps: 0



DRESSING - LOWER BODY - SCORE:



0-UNK  



TOILETING:



Activity did not occur on this shift



TOILETING - SCORE:



0-UNK  



BLADDER MANAGEMENT:



Activity did not occur on this shift



BLADDER MANAGEMENT - SCORE:



7-IND  



BOWEL MANAGEMENT:



Activity did not occur on this shift



BOWEL MANAGEMENT - SCORE:



7-IND  



TRANSFERS: BED, CHAIR, WHEELCHAIR:



Activity did not occur on this shift



TRANSFERS: BED, CHAIR, WHEELCHAIR - SCORE:



0-UNK  



TRANSFERS: TOILET:



Activity did not occur on this shift



TRANSFERS: TOILET - SCORE:



0-UNK  



TRANSFERS: SHOWER:



Activity did not occur on this shift



TRANSFERS: SHOWER - SCORE:



0-UNK  



TRANSFERS: TUB:



Activity did not occur on this shift



TRANSFERS: TUB - SCORE:



0-UNK  



LOCOMOTION: WALK:



Activity did not occur on this shift



LOCOMOTION: WALK - SCORE:



0-UNK  



LOCOMOTION: WHEELCHAIR:



Activity did not occur on this shift



LOCOMOTION: WHEELCHAIR - SCORE:



0-UNK  



LOCOMOTION: STAIRS:



Activity did not occur on this shift



LOCOMOTION: STAIRS - SCORE:



0-UNK  



COMPREHENSION:



COMPREHENSION - STEP 1:



Does the patient require help to understand complex and abstract ideas (such as current events, finan
nroa, discharge planning, medical issues, relationships, etc)? No.



COMPREHENSION - STEP 2:



Does the patient need extra time, require an assistive device (such as glasses, hearing aids, or an a
ugmentative communication system), OR does s/he have mild difficulty expressing complex and abstract 
ideas (including mild dysarthria or mild word-finding problems)? Yes.



COMPREHENSION - SCORE:



6-ANUJA  



EXPRESSION



EXPRESSION - STEP 1:



Does the patient require help expressing complex and abstract ideas (such as current events, finances
, discharge planning, medical issues, relationships, etc)? No.



EXPRESSION - STEP 2:



Does the patient need extra time, require an assistive device (such as augmentive communication syste
m or a communication board), OR does s/he have mild difficulty expressing complex and abstract ideas 
(including mild dysarthria or mild word-find problems)? Yes.



EXPRESSION - SCORE:



6-ANUJA  



SOCIAL INTERACTION:



SOCIAL INTERACTION - STEP 1:



Does the patient require a helper to interact with others in social and therapeutic situations? No.



SOCIAL INTERACTION - STEP 2:



Does the patient need extra time in social situations, OR does s/he interact with staff, other patien
ts, and family members ONLY in structured environments, OR does s/he require medication for social in
teraction? Yes, patient needs extra time



SOCIAL INTERACTION - SCORE:



6-ANUJA  



PROBLEM SOLVING:



PROBLEM SOLVING - STEP 1:



Does the patient need help to solve complex problems such as managing a checking account or confronti
ng interpersonal problems? Yes.



PROBLEM SOLVING - STEP 2:



Does the patient solve basic routine problems half or more of the time? Yes.



PROBLEM SOLVING - STEP 3:



How often does the patient need help to solve basic routine problems? 10%-24% of the time



PROBLEM SOLVING - SCORE:



4-MIN  



MEMORY:



MEMORY - STEP 1:



Does the patient need help to remember frequently encountered people, daily routines, and executing r
equests? Yes.



MEMORY - STEP 2:



How often does the patient need help to remember frequently encountered people, daily routines, and e
xecuting requests? 25% - 49% of the time



MEMORY - SCORE:



3-MOD  



SIGNATURE PANEL:



The following modified sections: Comprehension - Score, Expression - Score, Social Interaction - Scor
e, Problem Solving - Score, Memory - Score were [electronically] signed by ST Jj on Fri Ju
l 13 2018 16:36:47 GMT-0500 (Central Daylight Time)

## 2018-07-13 NOTE — FAST
ENCOUNTER DATE AND TIME:



07/13/2018 08:00 (CDT)



NAME



ESPERANZA CONLEY



YOB: 1935



DATE OF ADMISSION:



07/05/2018 15:57 (CDT)



PHONE:



[phone]



AGE:



83



N#







GENDER:



Female



ENCOUNTER PHYSICIAN:



Dr. Moris Gonzalez M.D.



ADMISSION DIAGNOSIS:



- Orthopaedic Disorders 08 -  Femur (Shaft) Fracture (08.2)

Left Femoral Neck Fracture. 



EATING:



Activity did not occur on this shift



EATING - SCORE:



0-UNK  



GROOMING:



Wash, rinse, and dry face



GROOMING - STEP 1:



Does the patient require assistance when grooming? Yes.



GROOMING - STEP 2:



Does the patient require the assistance of a helper? Yes.



GROOMING - STEP 3:



How much assistance does the patient require from the helper? Only prior equipment preparation/set up
 from the helper



GROOMING - SCORE:



5-SUP  



BATHING:



Abdomen

Buttocks

Chest

Left lower leg and foot

Left upper leg

Perineal area

Right arm

Right lower leg and foot

Right upper leg



BATHING - STEP 1:



Does the patient require assistance when bathing? Yes.



BATHING - STEP 2:



Does the patient require the assistance of a helper? Yes.



BATHING - STEP 3:



How much assistance does the patient require from the helper? Only incidental help such as placement 
of a wash cloth in his/her hand a few times as s/he bathes OR help to bathe just one or two areas of 
the body



BATHING - SCORE:



4-MIN  



DRESSING - UPPER BODY:



Button down shirt or blouse - NOT tucked in (four steps)  



ARTICLES SCORE



Total number of steps: 4



DRESSING - UPPER BODY - STEP 1:



Does the patient require help when dressing above the waist? Yes.



DRESSING - UPPER BODY - STEP 2:



Does the patient require the assistance of a helper? Yes.



DRESSING - UPPER BODY - STEP 3:



Does the helper touch the patient while dressing? No.



DRESSING - UPPER BODY - SCORE:



5-SUP  



DRESSING - LOWER BODY:



Elastic waist pants (three steps)  

Sock - Left foot (one step)  

Sock - Right foot (one step)  

Underwear (three steps)  



ARTICLES SCORE



Total number of steps: 8



DRESSING - LOWER BODY - STEP 1:



Does the patient require help when dressing below the waist? Yes.



DRESSING - LOWER BODY - STEP 2:



Does the patient require the assistance of a helper? Yes.



DRESSING - LOWER BODY - STEP 3:



Does the helper touch the patient while dressing? Yes.



DRESSING - LOWER BODY - STEP 4:



How many of the total steps does the patient complete on his/her own? 5



DRESSING - LOWER BODY - SCORE:



3-MOD  



TOILETING:



Activity did not occur on this shift



TOILETING - SCORE:



0-UNK  



BLADDER MANAGEMENT:



Activity did not occur on this shift



BLADDER MANAGEMENT - SCORE:



7-IND  



BOWEL MANAGEMENT:



Activity did not occur on this shift



BOWEL MANAGEMENT - SCORE:



7-IND  



TRANSFERS: BED, CHAIR, WHEELCHAIR:



Activity did not occur on this shift



TRANSFERS: BED, CHAIR, WHEELCHAIR - SCORE:



0-UNK  



TRANSFERS: TOILET:



Activity did not occur on this shift



TRANSFERS: TOILET - SCORE:



0-UNK  



TRANSFERS: SHOWER:



TRANSFERS: SHOWER - STEP 1:



Does the patient require assistance with shower transfers? Yes.



TRANSFERS: SHOWER - STEP 2:



Does the patient require the assistance of a helper? Yes.



TRANSFERS: SHOWER - STEP 3:



How much assistance does the patient require from the helper? Only incidental help such as contact gu
arding or steadying during shower transfers, or help to lift one leg into the shower



TRANSFERS: SHOWER - SCORE:



4-MIN  



TRANSFERS: TUB:



Activity did not occur on this shift



TRANSFERS: TUB - SCORE:



0-UNK  



LOCOMOTION: WALK:



Activity did not occur on this shift



LOCOMOTION: WALK - SCORE:



0-UNK  



LOCOMOTION: WHEELCHAIR:



Activity did not occur on this shift



LOCOMOTION: WHEELCHAIR - SCORE:



0-UNK  



LOCOMOTION: STAIRS:



Activity did not occur on this shift



LOCOMOTION: STAIRS - SCORE:



0-UNK  



COMPREHENSION:



COMPREHENSION - STEP 1:



Does the patient require help to understand complex and abstract ideas (such as current events, finan
nora, discharge planning, medical issues, relationships, etc)? Yes.



COMPREHENSION - STEP 2:



Does the patient require help to understand questions or statements about basic needs or ideas (such 
as hunger, thirst, sleep, safety, daily schedule, room location, or discomfort) half or more of the t
saleem? No.



COMPREHENSION - STEP 3:



How often does the patient need help to understand directions and conversation about basic needs? Les
s than 10% of the time



COMPREHENSION - SCORE:



5-SUP  



EXPRESSION



EXPRESSION: TYPE:



Non-Vocal



EXPRESSION - STEP 1:



Does the patient require help expressing complex and abstract ideas (such as current events, finances
, discharge planning, medical issues, relationships, etc)? No.



EXPRESSION - STEP 2:



Does the patient need extra time, require an assistive device (such as augmentive communication syste
m or a communication board), OR does s/he have mild difficulty expressing complex and abstract ideas 
(including mild dysarthria or mild word-find problems)? Yes.



EXPRESSION - SCORE:



6-ANUJA  



SOCIAL INTERACTION:



SOCIAL INTERACTION - STEP 1:



Does the patient require a helper to interact with others in social and therapeutic situations? No.



SOCIAL INTERACTION - STEP 2:



Does the patient need extra time in social situations, OR does s/he interact with staff, other patien
ts, and family members ONLY in structured environments, OR does s/he require medication for social in
teraction? No.



SOCIAL INTERACTION - SCORE:



7-IND  



PROBLEM SOLVING:



PROBLEM SOLVING - STEP 1:



Does the patient need help to solve complex problems such as managing a checking account or confronti
ng interpersonal problems? Yes.



PROBLEM SOLVING - STEP 2:



Does the patient solve basic routine problems half or more of the time? Yes.



PROBLEM SOLVING - STEP 3:



How often does the patient need help to solve basic routine problems? Less than 10% of the time



PROBLEM SOLVING - SCORE:



5-SUP  



MEMORY:



MEMORY - STEP 1:



Does the patient need help to remember frequently encountered people, daily routines, and executing r
equests? Yes.



MEMORY - STEP 2:



How often does the patient need help to remember frequently encountered people, daily routines, and e
xecuting requests? Less than 10% of the time



MEMORY - SCORE:



5-SUP  



SIGNATURE PANEL:



The following modified sections: Eating - Score, Grooming - Score, Bathing - Score, Dressing - Upper 
Body - Score, Dressing - Lower Body - Score, Toileting - Score, Transfers: Bed, Chair, Wheelchair - S
core, Transfers: Toilet - Score, Transfers: Shower - Score, Transfers: Tub - Score, Comprehension - S
core, Expression - Score, Social Interaction - Score, Problem Solving - Score, Memory - Score were [e
lectronically] signed by AKASH Harvey on Fri Jul 13 2018 12:23:38 T-0500 (Central Daylig
ht Time)

## 2018-07-13 NOTE — P.RH.PN
Estimated Length of Stay: 14


Expected Discharge Date: 07/18/18


Discharge Disposition Plan: Home


Family Support: Yes


Long Term Goal: Mobility, Transfers, Self Care


Vital Signs: 


 Last Vital Signs











Temp  97.4 F   07/13/18 07:35


 


Pulse  74   07/13/18 07:59


 


Resp  16   07/13/18 07:35


 


BP  134/63   07/13/18 07:59


 


Pulse Ox  92   07/13/18 07:35











Laboratory: 


 Laboratory Last Values











WBC  6.9 K/uL (4.3-10.9)   07/12/18  06:36    


 


RBC  3.15 M/uL (3.86-4.86)  L  07/12/18  06:36    


 


Hgb  9.4 g/dL (12.0-15.0)  L  07/12/18  06:36    


 


Hct  28.0 % (36.0-45.0)  L  07/12/18  06:36    


 


MCV  88.7 fL ()   07/12/18  06:36    


 


MCH  29.8 pg (27.0-35.0)   07/12/18  06:36    


 


MCHC  33.6 g/dL (32.0-36.0)   07/12/18  06:36    


 


RDW  16.8 % (12.1-15.2)  H  07/12/18  06:36    


 


Plt Count  353 K/uL (152-406)   07/12/18  06:36    


 


MPV  7.9 fL (7.6-11.3)   07/12/18  06:36    


 


Neutrophils %  50.5 % (41.7-73.7)   07/12/18  06:36    


 


Lymphocytes %  27.7 % (15.3-44.8)   07/12/18  06:36    


 


Monocytes %  13.9 % (3.3-12.3)  H  07/12/18  06:36    


 


Eosinophils %  6.7 % (0-4.4)  H  07/12/18  06:36    


 


Basophils %  1.2 % (0-1.3)   07/12/18  06:36    


 


Absolute Neutrophils  3.5 K/uL (1.8-8.0)   07/12/18  06:36    


 


Absolute Lymphocytes  1.9 K/uL (0.7-4.9)   07/12/18  06:36    


 


Absolute Monocytes  1.0 K/uL (0.1-1.3)   07/12/18  06:36    


 


Absolute Eosinophils  0.5 K/uL (0-0.5)   07/12/18  06:36    


 


Absolute Basophils  0.1 K/uL (0-0.5)   07/12/18  06:36    


 


Sodium  140 mmol/L (136-145)   07/12/18  06:36    


 


Potassium  4.3 mmol/L (3.5-5.1)   07/12/18  06:36    


 


Chloride  105 mmol/L ()   07/12/18  06:36    


 


Carbon Dioxide  30 mmol/L (21-32)   07/12/18  06:36    


 


BUN  14 mg/dL (7-18)   07/12/18  06:36    


 


Creatinine  0.70 mg/dL (0.55-1.3)   07/12/18  06:36    


 


Estimated GFR  80 mL/min (=/>90)  L  07/12/18  06:36    


 


Glucose  89 mg/dL ()   07/12/18  06:36    


 


Calcium  8.3 mg/dL (8.5-10.1)  L  07/12/18  06:36    


 


Magnesium  1.8 mg/dL (1.8-2.4)   07/06/18  05:35    


 


Albumin  2.0 g/dL (3.4-5.0)  L  07/12/18  06:36    


 


Prealbumin  11.3 mg/dL (20-40)  L  07/12/18  06:36    


 


Urine Color  Yellow   07/05/18  21:03    


 


Urine Appearance  Clear   07/05/18  21:03    


 


Urine pH  6.5  (5.0-7.0)   07/05/18  21:03    


 


Ur Specific Gravity  <=1.005  (1.005-1.030)   07/05/18  21:03    


 


Urine Ketones  Negative  (NEG)   07/05/18  21:03    


 


Urine Blood  Negative  (NEG)   07/05/18  21:03    


 


Urine Nitrite  Negative  (NEG)   07/05/18  21:03    


 


Urine Bilirubin  Negative  (NEG)   07/05/18  21:03    


 


Urine Urobilinogen  0.2 mg/dL (0.2-1.0)   07/05/18  21:03    


 


Ur Leukocyte Esterase  Negative  (NEG)   07/05/18  21:03    


 


Urine RBC  <5 /HPF (NONE SEEN)   07/05/18  21:03    


 


Urine WBC  <5 /HPF (<5)   07/05/18  21:03    


 


Ur Squamous Epith Cells  <5 /HPF (NONE SEEN)   07/05/18  21:03    


 


Urine Bacteria  <20 /HPF (<20)   07/05/18  21:03    


 


Urine Culture Reflexed  Not needed   07/05/18  21:03    


 


Urine Glucose  Negative  (NEG)   07/05/18  21:03    


 


Urine Total Protein  Negative  (NEG)   07/05/18  21:03    











Weight: 120 lb 7 oz


Wound Present: No


Closed Surgical Incision Present: Yes


Negative Pressure Wound Therapy Present: No


Physician Update: Her prealbumin has decreased to 11.3 from 14.8. She is taking 

promod and hemocyte plus. Mildly low Hgb of 9.4. She is taking hemocyte plus. 

She is making good progress overall but she is somewhat limited by mild to 

moderate pain. She is at moderate up to maximum assistance with cognitive 

functioning.


Medical Issues: UA result in- Cranberry 400mg BId ordered. Instructed to 

increase PO intake


Pain Issues: Pain on L shoulder & R rib - on Lidoderm 5% patch and Tramadol 

50mg Q4H PRN


Comment: patient with no new bruising, hematomas or abrasions s/p fall.


Functional Improvement: Patient has met all short-term goals at this time and 

is progressing as expected toward long-term goals.  Patient is presenting w/ 

memory deficits, and confusion.


Speech Therapy Update: Patient presents with significant difficulty verbally 

recalling hip precautions and safety strategies. When given hypothetical 

scenarios, patient requires max A to verbalize appropriate methods to maintian 

hip precautions. Pt. does verbalize intent to call the nurse before standing, 

but will likely need supervision upon discharge.


Summary: Patient's care plan and long term goals have been reviewed and revised 

as necessary. Please see the Rehabilitation Signature page for all necessary 

signatures.

## 2018-07-13 NOTE — RAD REPORT
EXAM DESCRIPTION:  CT - Chest Abd Pelvis Wo Con - 7/13/2018 2:11 pm

 

CLINICAL HISTORY:  Chest and abdomen pain.

Rt flank pain, s/p fall, possible rib fx/tissue injury

 

COMPARISON:  THORAX W CONTRAST dated 9/6/2011; Ribs Bilateral dated 7/10/2018; THORAX W CONTRAST date
d 4/7/2011; Shoulder  Left 2 View dated 7/10/2018

 

TECHNIQUE:  Approximately 100 mL nonionic IV contrast was administered to the patient.

 

All CT scans are performed using dose optimization technique as appropriate and may include automated
 exposure control or mA/KV adjustment according to patient size.

 

FINDINGS:  Prominent emphysematous changes are present throughout the lungs. Postsurgical changes of 
a left lobectomy noted with reduced left lung volume. Bronchiectasis is seen in the left lower lobe. 
Calcified granulomas present in the right upper lobe.Small to moderate hiatal hernia is present.No pl
eural or pericardial effusion.No intrathoracic adenopathy.A nondisplaced fracture of the right poster
olateral eleventh rib noted. No additional rib fractures seen. Impacted fracture the proximal left hu
meral neck is partially visualized.

 

The liver, spleen, pancreas, adrenal glands and kidneys are within normal limits.

 

No bowel obstruction, free air, free fluid or abscess. Appendectomy. Sigmoid diverticulosis coli is p
resent without diverticulitis. Aortic atherosclerosis. No pathologic lymphadenopathy in the abdomen o
r pelvis.

 

IMPRESSION:  Nondisplaced fracture right eleventh posterolateral rib.No pneumothorax or pulmonary con
tusion suspected.

 

Advanced COPD.

## 2018-07-13 NOTE — FAST
SHIFT START DATE/TIME:



07/12/2018 19:00 (CDT)



SHIFT END DATE/TIME:



07/13/2018 07:00 (CDT)



NAME



ESPERANZA CONLEY



YOB: 1935



DATE OF ADMISSION:



07/05/2018 15:57 (CDT)



PHONE:



[phone]



AGE:



83



N#







GENDER:



Female



ENCOUNTER PHYSICIAN:



Dr. Moris Gonzalez M.D.



ADMISSION DIAGNOSIS:



- Orthopaedic Disorders 08 -  Femur (Shaft) Fracture (08.2)

Left Femoral Neck Fracture. 



EATING:



Activity did not occur on this shift



EATING - SCORE:



0-UNK  



GROOMING:



Activity did not occur on this shift



GROOMING - SCORE:



0-UNK  



BATHING:



Activity did not occur on this shift



BATHING - SCORE:



0-UNK  



DRESSING - UPPER BODY:



Activity did not occur on this shift



ARTICLES SCORE



Total number of steps: 0



DRESSING - UPPER BODY - SCORE:



0-UNK  



DRESSING - LOWER BODY:



Activity did not occur on this shift



ARTICLES SCORE



Total number of steps: 0



DRESSING - LOWER BODY - SCORE:



0-UNK  



TOILETING:



TOILETING - STEP 1:



Does the patient require assistance with toileting? Yes.



TOILETING - STEP 2:



Does the patient require the assistance of a helper? Yes.



TOILETING - STEP 3:



How much assistance does the patient require from the helper? Hands-on assistance from the helper



TOILETING - STEP 4:



Of the 3 tasks: 1) Adjusting clothing prior to use, 2) Cleansing of perineal area,  3) Adjusting clot
mary after use; How many tasks does the patient perform WITHOUT assistance of the helper? Three tasks
 with steadying assistance from the helper



TOILETING - SCORE:



4-MIN  



BLADDER MANAGEMENT:



BLADDER MANAGEMENT - STEP 1:



Does the patient control the bladder completely and intentionally without equipment or devices or med
ications, and is always continent? Yes.



BLADDER MANAGEMENT - SCORE:



7-IND  



BLADDER MANAGEMENT - FREQUENCY OF ACCIDENTS:



BLADDER MANAGEMENT(FA) - STEP 1:



How many accidents has the patient had during the current shift? 0



BOWEL MANAGEMENT:



BOWEL MANAGEMENT - STEP 1:



Does the patient control bowels completely and intentionally without equipment devices or medications
 AND is always continent? Yes.



BOWEL MANAGEMENT - SCORE:



7-IND  



BOWEL MANAGEMENT - FREQUENCY OF ACCIDENTS:



BOWEL MANAGEMENT(FA) - STEP 1:



How many accidents has the patient had during the current shift? 0



TRANSFERS: BED, CHAIR, WHEELCHAIR:



TRANSFERS: BED, CHAIR, WHEELCHAIR - STEP 1:



Does the patient require assistance with bed, chair, or wheelchair transfers? Yes.



TRANSFERS: BED, CHAIR, WHEELCHAIR - STEP 2:



Does the patient require the assistance of a helper? Yes.



TRANSFERS: BED, CHAIR, WHEELCHAIR - STEP 3:



How much assistance does the patient require from the helper? Steadying/guiding assistance



TRANSFERS: BED, CHAIR, WHEELCHAIR - SCORE:



4-MIN  



TRANSFERS: TOILET:



TRANSFERS: TOILET - STEP 1:



Does the patient require assistance with toilet transfers? Yes.



TRANSFERS: TOILET - STEP 2:



Does the patient require the assistance of a helper? Yes.



TRANSFERS: TOILET - STEP 3:



How much assistance does the patient require from the helper? Only supervision, cuing, coaxing, OR he
lp to set out transfer equipment or to lock brakes and/or lift foot rests



TRANSFERS: TOILET - SCORE:



5-SUP  



TRANSFERS: SHOWER:



Activity did not occur on this shift



TRANSFERS: SHOWER - SCORE:



0-UNK  



TRANSFERS: TUB:



Activity did not occur on this shift



TRANSFERS: TUB - SCORE:



0-UNK  



LOCOMOTION: WALK:



Activity did not occur on this shift



LOCOMOTION: WALK - SCORE:



0-UNK  



LOCOMOTION: WHEELCHAIR:



Activity did not occur on this shift



LOCOMOTION: WHEELCHAIR - SCORE:



0-UNK  



COMPREHENSION:



COMPREHENSION - STEP 1:



Does the patient require help to understand complex and abstract ideas (such as current events, finan
nora, discharge planning, medical issues, relationships, etc)? Yes.



COMPREHENSION - STEP 2:



Does the patient require help to understand questions or statements about basic needs or ideas (such 
as hunger, thirst, sleep, safety, daily schedule, room location, or discomfort) half or more of the t
saleem? Yes.



COMPREHENSION - STEP 3:



Is the patient basically able to understand and respond appropriately and consistently? Yes.



COMPREHENSION - SCORE:



2-MAX  



EXPRESSION



EXPRESSION - STEP 1:



Does the patient require help expressing complex and abstract ideas (such as current events, finances
, discharge planning, medical issues, relationships, etc)? Yes.



EXPRESSION - STEP 2:



Does the patient require help to express basic necessities or ideas (such as hunger, thirst, sleep, s
afety, daily schedule, room location, or discomfort) half or more of the time? Yes.



EXPRESSION - STEP 3:



Is the patient basically unable to express or does s/he express inappropriately or inconsistently shashank
pite prompting? No.



EXPRESSION - SCORE:



2-MAX  



SOCIAL INTERACTION:



SOCIAL INTERACTION - STEP 1:



Does the patient require a helper to interact with others in social and therapeutic situations? Yes.



SOCIAL INTERACTION - STEP 2:



Does the patient interact appropriately half or more of the time? Yes.



SOCIAL INTERACTION - STEP 3:



How often does the patient need help to interact appropriately? Less than 10% of the time



SOCIAL INTERACTION - SCORE:



5-SUP  



PROBLEM SOLVING:



PROBLEM SOLVING - STEP 1:



Does the patient need help to solve complex problems such as managing a checking account or confronti
ng interpersonal problems? Yes.



PROBLEM SOLVING - STEP 2:



Does the patient solve basic routine problems half or more of the time? Yes.



PROBLEM SOLVING - STEP 3:



How often does the patient need help to solve basic routine problems? Less than 10% of the time



PROBLEM SOLVING - SCORE:



5-SUP  



MEMORY:



MEMORY - STEP 1:



Does the patient need help to remember frequently encountered people, daily routines, and executing r
equests? Yes.



MEMORY - STEP 2:



Does the patient have slight difficulty recognizing frequently encountered people, daily routines, or
 executing requests without the need for repetition or using self-initiated or environmental cues to 
remember? Yes.



SIGNATURE PANEL:



The following modified sections: Eating - Score, Grooming - Score, Bathing - Score, Dressing - Upper 
Body - Score, Dressing - Lower Body - Score, Toileting - Score, Bladder Management - Score, Bowel Man
agement - Score, Transfers: Bed, Chair, Wheelchair - Score, Transfers: Toilet - Score, Transfers: Fabi
wer - Score, Transfers: Tub - Score, Locomotion: Walk - Score, Locomotion: Wheelchair - Score, Compre
hension - Score, Expression - Score, Social Interaction - Score, Problem Solving - Score, Memory - Sc
ore were [electronically] signed by Chetna Barrera on Fri Jul 13 2018 02:54:17 GMT-0500 (Central Day
light Time)

## 2018-07-13 NOTE — FAST
ENCOUNTER DATE AND TIME:



07/13/2018 08:00 (CDT)



NAME



ESPERANZA CONLEY



YOB: 1935



DATE OF ADMISSION:



07/05/2018 15:57 (CDT)



PHONE:



[phone]



AGE:



83



N#







GENDER:



Female



ENCOUNTER PHYSICIAN:



Dr. Moris Gonzalez M.D.



ADMISSION DIAGNOSIS:



- Orthopaedic Disorders 08 -  Femur (Shaft) Fracture (08.2)

Left Femoral Neck Fracture. 



EATING:



Activity did not occur on this shift



EATING - SCORE:



0-UNK  



GROOMING:



Activity did not occur on this shift



GROOMING - SCORE:



0-UNK  



BATHING:



Activity did not occur on this shift



BATHING - SCORE:



0-UNK  



DRESSING - UPPER BODY:



Activity did not occur on this shift

Patient is not dressing in public clothing



ARTICLES SCORE



Total number of steps: 0



DRESSING - UPPER BODY - SCORE:



0-UNK  



DRESSING - LOWER BODY:



Activity did not occur on this shift

Patient is not dressing in public clothing



ARTICLES SCORE



Total number of steps: 0



DRESSING - LOWER BODY - SCORE:



0-UNK  



TOILETING:



Activity did not occur on this shift



TOILETING - SCORE:



0-UNK  



BLADDER MANAGEMENT:



Activity did not occur on this shift



BLADDER MANAGEMENT - SCORE:



7-IND  



BOWEL MANAGEMENT:



Activity did not occur on this shift



BOWEL MANAGEMENT - SCORE:



7-IND  



TRANSFERS: BED, CHAIR, WHEELCHAIR:



Activity did not occur on this shift



TRANSFERS: BED, CHAIR, WHEELCHAIR - SCORE:



0-UNK  



TRANSFERS: TOILET:



Activity did not occur on this shift



TRANSFERS: TOILET - SCORE:



0-UNK  



TRANSFERS: SHOWER:



Activity did not occur on this shift



TRANSFERS: SHOWER - SCORE:



0-UNK  



TRANSFERS: TUB:



Activity did not occur on this shift



TRANSFERS: TUB - SCORE:



0-UNK  



LOCOMOTION: WALK:



Activity did not occur on this shift



LOCOMOTION: WALK - SCORE:



0-UNK  



LOCOMOTION: WHEELCHAIR:



Activity did not occur on this shift



LOCOMOTION: WHEELCHAIR - SCORE:



0-UNK  



LOCOMOTION: STAIRS:



Activity did not occur on this shift



LOCOMOTION: STAIRS - SCORE:



0-UNK  



COMPREHENSION:



COMPREHENSION - SCORE:



0-UNK  



EXPRESSION



EXPRESSION - SCORE:



0-UNK  



SOCIAL INTERACTION:



SOCIAL INTERACTION - SCORE:



0-UNK  



PROBLEM SOLVING:



PROBLEM SOLVING - SCORE:



0-UNK  



MEMORY:



MEMORY - SCORE:



0-UNK  



SIGNATURE PANEL:



The following modified sections: Transfers: Bed, Chair, Wheelchair - Score, Transfers: Toilet - Score
, Locomotion: Walk - Score, Locomotion: Wheelchair - Score, Locomotion: Stairs - Score were [electron
ically] signed by Forest Roberts PTA on Fri Jul 13 2018 14:50:29 GMT-0500 (Central Daylight Time)

## 2018-07-14 RX ADMIN — Medication SCH ML: at 20:16

## 2018-07-14 RX ADMIN — Medication SCH MG: at 20:17

## 2018-07-14 RX ADMIN — LEVOTHYROXINE SODIUM SCH MG: 0.1 TABLET ORAL at 05:07

## 2018-07-14 RX ADMIN — ENOXAPARIN SODIUM SCH MG: 30 INJECTION SUBCUTANEOUS at 16:22

## 2018-07-14 RX ADMIN — Medication SCH TAB: at 08:15

## 2018-07-14 RX ADMIN — Medication SCH ML: at 08:17

## 2018-07-14 RX ADMIN — TRAMADOL HYDROCHLORIDE PRN MG: 50 TABLET, COATED ORAL at 02:14

## 2018-07-14 RX ADMIN — ISOSORBIDE MONONITRATE SCH MG: 30 TABLET, EXTENDED RELEASE ORAL at 08:16

## 2018-07-14 RX ADMIN — BISOPROLOL FUMARATE SCH MG: 5 TABLET, COATED ORAL at 08:16

## 2018-07-14 RX ADMIN — AMIODARONE HYDROCHLORIDE SCH MG: 200 TABLET ORAL at 08:17

## 2018-07-14 RX ADMIN — SENNOSIDES AND DOCUSATE SODIUM SCH TAB: 8.6; 5 TABLET ORAL at 20:16

## 2018-07-14 RX ADMIN — Medication SCH ML: at 20:18

## 2018-07-14 RX ADMIN — SERTRALINE HYDROCHLORIDE SCH MG: 100 TABLET ORAL at 08:16

## 2018-07-14 RX ADMIN — GABAPENTIN SCH MG: 300 CAPSULE ORAL at 20:17

## 2018-07-14 RX ADMIN — Medication SCH: at 08:00

## 2018-07-14 RX ADMIN — IRON SUPPLEMENT SCH MG: 325 TABLET ORAL at 08:14

## 2018-07-14 RX ADMIN — Medication SCH MG: at 08:15

## 2018-07-14 RX ADMIN — HYDROCODONE BITARTRATE AND ACETAMINOPHEN PRN TAB: 10; 325 TABLET ORAL at 16:22

## 2018-07-14 RX ADMIN — DOXEPIN HYDROCHLORIDE SCH MG: 10 CAPSULE ORAL at 08:15

## 2018-07-14 RX ADMIN — HYDROCODONE BITARTRATE AND ACETAMINOPHEN PRN TAB: 10; 325 TABLET ORAL at 12:04

## 2018-07-14 RX ADMIN — NIFEDIPINE SCH MG: 30 TABLET, FILM COATED, EXTENDED RELEASE ORAL at 20:17

## 2018-07-14 RX ADMIN — Medication SCH PATCH: at 08:16

## 2018-07-14 RX ADMIN — HYDROCODONE BITARTRATE AND ACETAMINOPHEN PRN TAB: 10; 325 TABLET ORAL at 08:15

## 2018-07-14 RX ADMIN — TRAMADOL HYDROCHLORIDE PRN MG: 50 TABLET, COATED ORAL at 09:08

## 2018-07-14 RX ADMIN — TRAMADOL HYDROCHLORIDE PRN MG: 50 TABLET, COATED ORAL at 20:17

## 2018-07-14 RX ADMIN — PANTOPRAZOLE SODIUM SCH MG: 40 TABLET, DELAYED RELEASE ORAL at 08:15

## 2018-07-14 RX ADMIN — MONTELUKAST SODIUM SCH MG: 10 TABLET, FILM COATED ORAL at 08:15

## 2018-07-14 RX ADMIN — GABAPENTIN SCH MG: 300 CAPSULE ORAL at 08:15

## 2018-07-14 NOTE — FAST
ENCOUNTER DATE AND TIME:



07/14/2018 08:00 (CDT)



NAME



ESPERANZA CONLEY



YOB: 1935



DATE OF ADMISSION:



07/05/2018 15:57 (CDT)



PHONE:



[phone]



AGE:



83



N#







GENDER:



Female



ENCOUNTER PHYSICIAN:



Dr. Moris Gonzalez M.D.



ADMISSION DIAGNOSIS:



- Orthopaedic Disorders 08 -  Femur (Shaft) Fracture (08.2)

Left Femoral Neck Fracture. 



EATING:



Activity did not occur on this shift



EATING - SCORE:



0-UNK  



GROOMING:



Activity did not occur on this shift



GROOMING - SCORE:



0-UNK  



BATHING:



Activity did not occur on this shift



BATHING - SCORE:



0-UNK  



DRESSING - UPPER BODY:



Activity did not occur on this shift

Patient is not dressing in public clothing



ARTICLES SCORE



Total number of steps: 0



DRESSING - UPPER BODY - SCORE:



0-UNK  



DRESSING - LOWER BODY:



Activity did not occur on this shift

Patient is not dressing in public clothing



ARTICLES SCORE



Total number of steps: 0



DRESSING - LOWER BODY - SCORE:



0-UNK  



TOILETING:



Activity did not occur on this shift



TOILETING - SCORE:



0-UNK  



BLADDER MANAGEMENT:



Activity did not occur on this shift



BLADDER MANAGEMENT - SCORE:



7-IND  



BOWEL MANAGEMENT:



Activity did not occur on this shift



BOWEL MANAGEMENT - SCORE:



7-IND  



TRANSFERS: BED, CHAIR, WHEELCHAIR:



Activity did not occur on this shift



TRANSFERS: BED, CHAIR, WHEELCHAIR - SCORE:



0-UNK  



TRANSFERS: TOILET:



Activity did not occur on this shift



TRANSFERS: TOILET - SCORE:



0-UNK  



TRANSFERS: SHOWER:



Activity did not occur on this shift



TRANSFERS: SHOWER - SCORE:



0-UNK  



TRANSFERS: TUB:



Activity did not occur on this shift



TRANSFERS: TUB - SCORE:



0-UNK  



LOCOMOTION: WALK:



Activity did not occur on this shift



LOCOMOTION: WALK - SCORE:



0-UNK  



LOCOMOTION: WHEELCHAIR:



Activity did not occur on this shift



LOCOMOTION: WHEELCHAIR - SCORE:



0-UNK  



LOCOMOTION: STAIRS:



Activity did not occur on this shift



LOCOMOTION: STAIRS - SCORE:



0-UNK  



COMPREHENSION:



COMPREHENSION - STEP 1:



Does the patient require help to understand complex and abstract ideas (such as current events, finan
nora, discharge planning, medical issues, relationships, etc)? No.



COMPREHENSION - STEP 2:



Does the patient need extra time, require an assistive device (such as glasses, hearing aids, or an a
ugmentative communication system), OR does s/he have mild difficulty expressing complex and abstract 
ideas (including mild dysarthria or mild word-finding problems)? Yes.



COMPREHENSION - SCORE:



6-ANUJA  



EXPRESSION



EXPRESSION - STEP 1:



Does the patient require help expressing complex and abstract ideas (such as current events, finances
, discharge planning, medical issues, relationships, etc)? No.



EXPRESSION - STEP 2:



Does the patient need extra time, require an assistive device (such as augmentive communication syste
m or a communication board), OR does s/he have mild difficulty expressing complex and abstract ideas 
(including mild dysarthria or mild word-find problems)? Yes.



EXPRESSION - SCORE:



6-ANUJA  



SOCIAL INTERACTION:



SOCIAL INTERACTION - STEP 1:



Does the patient require a helper to interact with others in social and therapeutic situations? No.



SOCIAL INTERACTION - STEP 2:



Does the patient need extra time in social situations, OR does s/he interact with staff, other patien
ts, and family members ONLY in structured environments, OR does s/he require medication for social in
teraction? Yes, patient needs extra time



SOCIAL INTERACTION - SCORE:



6-ANUJA  



PROBLEM SOLVING:



PROBLEM SOLVING - STEP 1:



Does the patient need help to solve complex problems such as managing a checking account or confronti
ng interpersonal problems? Yes.



PROBLEM SOLVING - STEP 2:



Does the patient solve basic routine problems half or more of the time? Yes.



PROBLEM SOLVING - STEP 3:



How often does the patient need help to solve basic routine problems? 10%-24% of the time



PROBLEM SOLVING - SCORE:



4-MIN  



MEMORY:



MEMORY - STEP 1:



Does the patient need help to remember frequently encountered people, daily routines, and executing r
equests? Yes.



MEMORY - STEP 2:



How often does the patient need help to remember frequently encountered people, daily routines, and e
xecuting requests? 25% - 49% of the time



MEMORY - SCORE:



3-MOD  



SIGNATURE PANEL:



The following modified sections: Comprehension - Score, Expression - Score, Social Interaction - Scor
e, Problem Solving - Score, Memory - Score were [electronically] signed by ST prabhu Gardner 14 2018 10:57:49 GMT-0500 (Central Daylight Time)

## 2018-07-14 NOTE — FAST
SHIFT START DATE/TIME:



07/13/2018 19:00 (CDT)



SHIFT END DATE/TIME:



07/14/2018 07:00 (CDT)



NAME



ESPERANZA CONLEY



YOB: 1935



DATE OF ADMISSION:



07/05/2018 15:57 (CDT)



PHONE:



[phone]



AGE:



83



N#







GENDER:



Female



ENCOUNTER PHYSICIAN:



Dr. Moris Gonzalez M.D.



ADMISSION DIAGNOSIS:



- Orthopaedic Disorders 08 -  Femur (Shaft) Fracture (08.2)

Left Femoral Neck Fracture. 



EATING:



Activity did not occur on this shift



EATING - SCORE:



0-UNK  



GROOMING:



Oral care

Wash, rinse, and dry face

Wash, rinse, and dry hands



GROOMING - STEP 1:



Does the patient require assistance when grooming? Yes.



GROOMING - STEP 2:



Does the patient require the assistance of a helper? Yes.



GROOMING - STEP 3:



How much assistance does the patient require from the helper? Only prior equipment preparation/set up
 from the helper



GROOMING - SCORE:



5-SUP  



BATHING:



Activity did not occur on this shift



BATHING - SCORE:



0-UNK  



DRESSING - UPPER BODY:



Patient is not dressing in public clothing



ARTICLES SCORE



Total number of steps: 0



DRESSING - UPPER BODY - SCORE:



0-UNK  



DRESSING - LOWER BODY:



Patient is not dressing in public clothing



ARTICLES SCORE



Total number of steps: 0



DRESSING - LOWER BODY - SCORE:



0-UNK  



TOILETING:



TOILETING - STEP 1:



Does the patient require assistance with toileting? Yes.



TOILETING - STEP 2:



Does the patient require the assistance of a helper? Yes.



TOILETING - STEP 3:



How much assistance does the patient require from the helper? Hands-on assistance from the helper



TOILETING - STEP 4:



Of the 3 tasks: 1) Adjusting clothing prior to use, 2) Cleansing of perineal area,  3) Adjusting clot
mary after use; How many tasks does the patient perform WITHOUT assistance of the helper? One task



TOILETING - SCORE:



2-MAX  



BLADDER MANAGEMENT:



BLADDER MANAGEMENT - STEP 1:



Does the patient control the bladder completely and intentionally without equipment or devices or med
ications, and is always continent? No.



BLADDER MANAGEMENT - STEP 2:



Does the patient require the assistance of a helper? Yes.



BLADDER MANAGEMENT - STEP 3:



How much assistance does the patient require from the helper? Patient requires contact assistance fro
m the helper



BLADDER MANAGEMENT - STEP 4:



How much contact assistance does the patient require from the helper? Patient requires minimal assist
ance to maintain an external device - by positioning, and the patient performs 75% or more of bladder
 management tasks, while the helper provides less than 25% of the assistance to position patient on /
 off bedpan



BLADDER MANAGEMENT - SCORE:



4-MIN  



BOWEL MANAGEMENT:



Activity did not occur on this shift



BOWEL MANAGEMENT - SCORE:



7-IND  



TRANSFERS: BED, CHAIR, WHEELCHAIR:



TRANSFERS: BED, CHAIR, WHEELCHAIR - STEP 1:



Does the patient require assistance with bed, chair, or wheelchair transfers? Yes.



TRANSFERS: BED, CHAIR, WHEELCHAIR - STEP 2:



Does the patient require the assistance of a helper? Yes.



TRANSFERS: BED, CHAIR, WHEELCHAIR - STEP 3:



How much assistance does the patient require from the helper? Steadying/guiding assistance



TRANSFERS: BED, CHAIR, WHEELCHAIR - SCORE:



4-MIN  



TRANSFERS: TOILET:



TRANSFERS: TOILET - STEP 1:



Does the patient require assistance with toilet transfers? Yes.



TRANSFERS: TOILET - STEP 2:



Does the patient require the assistance of a helper? Yes.



TRANSFERS: TOILET - STEP 3:



How much assistance does the patient require from the helper? Patient performs half or more of the tr
ansferring tasks



TRANSFERS: TOILET - STEP 4:



Does the patient need only incidental help such as contact guard or steadying during toilet transfer?
 No. Patient needs more than incidental help



TRANSFERS: TOILET - SCORE:



3-MOD  



TRANSFERS: SHOWER:



Activity did not occur on this shift



TRANSFERS: SHOWER - SCORE:



0-UNK  



TRANSFERS: TUB:



Activity did not occur on this shift



TRANSFERS: TUB - SCORE:



0-UNK  



LOCOMOTION: WALK:



Activity did not occur on this shift



LOCOMOTION: WALK - SCORE:



0-UNK  



LOCOMOTION: WHEELCHAIR:



Activity did not occur on this shift



LOCOMOTION: WHEELCHAIR - SCORE:



0-UNK  



COMPREHENSION:



COMPREHENSION: TYPE:



Both



COMPREHENSION - STEP 1:



Does the patient require help to understand complex and abstract ideas (such as current events, finan
nora, discharge planning, medical issues, relationships, etc)? No.



COMPREHENSION - STEP 2:



Does the patient need extra time, require an assistive device (such as glasses, hearing aids, or an a
ugmentative communication system), OR does s/he have mild difficulty expressing complex and abstract 
ideas (including mild dysarthria or mild word-finding problems)? Yes.



COMPREHENSION - SCORE:



6-ANUJA  



EXPRESSION



EXPRESSION: TYPE:



Both



EXPRESSION - STEP 1:



Does the patient require help expressing complex and abstract ideas (such as current events, finances
, discharge planning, medical issues, relationships, etc)? No.



EXPRESSION - STEP 2:



Does the patient need extra time, require an assistive device (such as augmentive communication syste
m or a communication board), OR does s/he have mild difficulty expressing complex and abstract ideas 
(including mild dysarthria or mild word-find problems)? Yes.



EXPRESSION - SCORE:



6-ANUJA  



SOCIAL INTERACTION:



SOCIAL INTERACTION - STEP 1:



Does the patient require a helper to interact with others in social and therapeutic situations? No.



SOCIAL INTERACTION - STEP 2:



Does the patient need extra time in social situations, OR does s/he interact with staff, other patien
ts, and family members ONLY in structured environments, OR does s/he require medication for social in
teraction? Yes, patient needs extra time



SOCIAL INTERACTION - SCORE:



6-ANUJA  



PROBLEM SOLVING:



PROBLEM SOLVING - STEP 1:



Does the patient need help to solve complex problems such as managing a checking account or confronti
ng interpersonal problems? Yes.



PROBLEM SOLVING - STEP 2:



Does the patient solve basic routine problems half or more of the time? Yes.



PROBLEM SOLVING - STEP 3:



How often does the patient need help to solve basic routine problems? Less than 10% of the time



PROBLEM SOLVING - SCORE:



5-SUP  



MEMORY:



MEMORY - STEP 1:



Does the patient need help to remember frequently encountered people, daily routines, and executing r
equests? Yes.



MEMORY - STEP 2:



How often does the patient need help to remember frequently encountered people, daily routines, and e
xecuting requests? Less than 10% of the time



MEMORY - SCORE:



5-SUP

## 2018-07-14 NOTE — FAST
SHIFT START DATE/TIME:



07/14/2018 07:00 (CDT)



SHIFT END DATE/TIME:



07/14/2018 19:00 (CDT)



NAME



ESPERANZA CONLEY



YOB: 1935



DATE OF ADMISSION:



07/05/2018 15:57 (CDT)



PHONE:



[phone]



AGE:



83



[facility]#







GENDER:



Female



ENCOUNTER PHYSICIAN:



Dr. Moris Gonzalez M.D.



ADMISSION DIAGNOSIS:



- Orthopaedic Disorders 08 -  Femur (Shaft) Fracture (08.2)

Left Femoral Neck Fracture. 



EATING:



EATING - STEP 1:



Does the patient require assistance when eating? Yes.



EATING - STEP 2:



Does the patient require the assistance of a helper? Yes.



EATING - STEP 3:



Does the patient perform half or more of the eating tasks? Yes.



EATING - STEP 4:



Does the patient need only supervision, cuing, coaxing OR help to apply an orthosis OR help to cut fo
od, open containers, pour liquids, or butter bread? Yes.



EATING - SCORE:



5-SUP  



GROOMING:



Comb/brush hair

Oral care



GROOMING - STEP 1:



Does the patient require assistance when grooming? Yes.



GROOMING - STEP 2:



Does the patient require the assistance of a helper? Yes.



GROOMING - STEP 3:



How much assistance does the patient require from the helper? Only prior equipment preparation/set up
 from the helper



GROOMING - SCORE:



5-SUP  



BATHING:



Activity did not occur on this shift



BATHING - SCORE:



0-UNK  



DRESSING - UPPER BODY:



Activity did not occur on this shift



ARTICLES SCORE



Total number of steps: 0



DRESSING - UPPER BODY - SCORE:



0-UNK  



DRESSING - LOWER BODY:



Activity did not occur on this shift



ARTICLES SCORE



Total number of steps: 0



DRESSING - LOWER BODY - SCORE:



0-UNK  



TOILETING:



TOILETING - STEP 1:



Does the patient require assistance with toileting? Yes.



TOILETING - STEP 2:



Does the patient require the assistance of a helper? Yes.



TOILETING - STEP 3:



How much assistance does the patient require from the helper? Hands-on assistance from the helper



TOILETING - STEP 4:



Of the 3 tasks: 1) Adjusting clothing prior to use, 2) Cleansing of perineal area,  3) Adjusting clot
mary after use; How many tasks does the patient perform WITHOUT assistance of the helper? Two tasks



TOILETING - SCORE:



3-MOD  



BLADDER MANAGEMENT:



BLADDER MANAGEMENT - STEP 1:



Does the patient control the bladder completely and intentionally without equipment or devices or med
ications, and is always continent? No.



BLADDER MANAGEMENT - STEP 2:



Does the patient require the assistance of a helper? No, patient requires and independently uses an a
ssistive device, such as a urinal, bedpan, bedside commode, catheter, absorbent pad, or collecting de
vice



BLADDER MANAGEMENT - SCORE:



6-ANUJA  



BOWEL MANAGEMENT:



Activity did not occur on this shift



BOWEL MANAGEMENT - SCORE:



7-IND  



TRANSFERS: BED, CHAIR, WHEELCHAIR:



TRANSFERS: BED, CHAIR, WHEELCHAIR - STEP 1:



Does the patient require assistance with bed, chair, or wheelchair transfers? Yes.



TRANSFERS: BED, CHAIR, WHEELCHAIR - STEP 2:



Does the patient require the assistance of a helper? Yes.



TRANSFERS: BED, CHAIR, WHEELCHAIR - STEP 3:



How much assistance does the patient require from the helper? Steadying/guiding assistance



TRANSFERS: BED, CHAIR, WHEELCHAIR - SCORE:



4-MIN  



TRANSFERS: TOILET:



TRANSFERS: TOILET - STEP 1:



Does the patient require assistance with toilet transfers? Yes.



TRANSFERS: TOILET - STEP 2:



Does the patient require the assistance of a helper? Yes.



TRANSFERS: TOILET - STEP 3:



How much assistance does the patient require from the helper? Patient performs half or more of the tr
ansferring tasks



TRANSFERS: TOILET - STEP 4:



Does the patient need only incidental help such as contact guard or steadying during toilet transfer?
 Yes.



TRANSFERS: TOILET - SCORE:



4-MIN  



TRANSFERS: SHOWER:



Activity did not occur on this shift



TRANSFERS: SHOWER - SCORE:



0-UNK  



TRANSFERS: TUB:



Activity did not occur on this shift



TRANSFERS: TUB - SCORE:



0-UNK  



LOCOMOTION: WALK:



Activity did not occur on this shift



LOCOMOTION: WALK - SCORE:



0-UNK  



LOCOMOTION: WHEELCHAIR:



Activity did not occur on this shift



LOCOMOTION: WHEELCHAIR - SCORE:



0-UNK  



COMPREHENSION:



COMPREHENSION - SCORE:



0-UNK  



EXPRESSION



EXPRESSION - SCORE:



0-UNK  



SOCIAL INTERACTION:



SOCIAL INTERACTION - SCORE:



0-UNK  



PROBLEM SOLVING:



PROBLEM SOLVING - SCORE:



0-UNK  



MEMORY:



MEMORY - SCORE:



0-UNK  



SIGNATURE PANEL:



The following modified sections: Eating - Score, Grooming - Score, Bathing - Score, Dressing - Upper 
Body - Score, Dressing - Lower Body - Score, Toileting - Score, Bladder Management - Score, Bowel Man
agement - Score, Transfers: Bed, Chair, Wheelchair - Score, Transfers: Toilet - Score, Transfers: Fabi
wer - Score, Transfers: Tub - Score, Locomotion: Walk - Score, Locomotion: Wheelchair - Score, Compre
hension - Score, Expression - Score, Social Interaction - Score, Problem Solving - Score, Memory - Sc
ore were [electronically] signed by Cirilo Austin on Sat Jul 14 2018 15:19:41 GMT-0500 (Central Daylight
 Time)

## 2018-07-15 RX ADMIN — AMIODARONE HYDROCHLORIDE SCH MG: 200 TABLET ORAL at 07:37

## 2018-07-15 RX ADMIN — BISOPROLOL FUMARATE SCH MG: 5 TABLET, COATED ORAL at 07:36

## 2018-07-15 RX ADMIN — Medication SCH ML: at 07:37

## 2018-07-15 RX ADMIN — HYDROCODONE BITARTRATE AND ACETAMINOPHEN PRN TAB: 10; 325 TABLET ORAL at 16:13

## 2018-07-15 RX ADMIN — GABAPENTIN SCH MG: 300 CAPSULE ORAL at 20:04

## 2018-07-15 RX ADMIN — MELATONIN PRN MG: 3 TAB ORAL at 20:05

## 2018-07-15 RX ADMIN — Medication SCH MG: at 07:36

## 2018-07-15 RX ADMIN — HYDROCODONE BITARTRATE AND ACETAMINOPHEN PRN TAB: 10; 325 TABLET ORAL at 00:44

## 2018-07-15 RX ADMIN — MONTELUKAST SODIUM SCH MG: 10 TABLET, FILM COATED ORAL at 07:36

## 2018-07-15 RX ADMIN — TRAMADOL HYDROCHLORIDE PRN MG: 50 TABLET, COATED ORAL at 20:04

## 2018-07-15 RX ADMIN — TRAMADOL HYDROCHLORIDE PRN MG: 50 TABLET, COATED ORAL at 10:22

## 2018-07-15 RX ADMIN — PANTOPRAZOLE SODIUM SCH MG: 40 TABLET, DELAYED RELEASE ORAL at 07:37

## 2018-07-15 RX ADMIN — LEVOTHYROXINE SODIUM SCH MG: 0.1 TABLET ORAL at 05:27

## 2018-07-15 RX ADMIN — HYDROCODONE BITARTRATE AND ACETAMINOPHEN PRN TAB: 10; 325 TABLET ORAL at 11:54

## 2018-07-15 RX ADMIN — Medication SCH ML: at 20:03

## 2018-07-15 RX ADMIN — Medication SCH PATCH: at 07:35

## 2018-07-15 RX ADMIN — SERTRALINE HYDROCHLORIDE SCH MG: 100 TABLET ORAL at 07:37

## 2018-07-15 RX ADMIN — GABAPENTIN SCH MG: 300 CAPSULE ORAL at 07:37

## 2018-07-15 RX ADMIN — SENNOSIDES AND DOCUSATE SODIUM SCH TAB: 8.6; 5 TABLET ORAL at 20:04

## 2018-07-15 RX ADMIN — HYDROCODONE BITARTRATE AND ACETAMINOPHEN PRN TAB: 10; 325 TABLET ORAL at 07:37

## 2018-07-15 RX ADMIN — ENOXAPARIN SODIUM SCH MG: 30 INJECTION SUBCUTANEOUS at 16:13

## 2018-07-15 RX ADMIN — ISOSORBIDE MONONITRATE SCH MG: 30 TABLET, EXTENDED RELEASE ORAL at 07:37

## 2018-07-15 RX ADMIN — Medication SCH MG: at 20:03

## 2018-07-15 RX ADMIN — NIFEDIPINE SCH MG: 30 TABLET, FILM COATED, EXTENDED RELEASE ORAL at 20:05

## 2018-07-15 RX ADMIN — DOXEPIN HYDROCHLORIDE SCH MG: 10 CAPSULE ORAL at 07:37

## 2018-07-15 RX ADMIN — Medication SCH TAB: at 07:37

## 2018-07-15 RX ADMIN — IRON SUPPLEMENT SCH MG: 325 TABLET ORAL at 07:37

## 2018-07-15 NOTE — FAST
SHIFT START DATE/TIME:



07/15/2018 07:00 (CDT)



SHIFT END DATE/TIME:



07/15/2018 19:00 (CDT)



NAME



ESPERANZA CONLEY



YOB: 1935



DATE OF ADMISSION:



07/05/2018 15:57 (CDT)



PHONE:



[phone]



AGE:



83



N#







GENDER:



Female



ENCOUNTER PHYSICIAN:



Dr. Moris Gonzalez M.D.



ADMISSION DIAGNOSIS:



- Orthopaedic Disorders 08 -  Femur (Shaft) Fracture (08.2)

Left Femoral Neck Fracture. 



EATING:



EATING - STEP 1:



Does the patient require assistance when eating? Yes.



EATING - STEP 2:



Does the patient require the assistance of a helper? Yes.



EATING - STEP 3:



Does the patient perform half or more of the eating tasks? Yes.



EATING - STEP 4:



Does the patient need only supervision, cuing, coaxing OR help to apply an orthosis OR help to cut fo
od, open containers, pour liquids, or butter bread? Yes.



EATING - SCORE:



5-SUP  



GROOMING:



Comb/brush hair

Oral care

Wash, rinse, and dry hands



GROOMING - STEP 1:



Does the patient require assistance when grooming? Yes.



GROOMING - STEP 2:



Does the patient require the assistance of a helper? No. The patient only requires an assistive devic
e, OR takes more than reasonable time to groom, OR there is a concern for safety as the patient groom
s



GROOMING - SCORE:



6-ANUJA  



BATHING:



Activity did not occur on this shift



BATHING - SCORE:



0-UNK  



DRESSING - UPPER BODY:



Activity did not occur on this shift



ARTICLES SCORE



Total number of steps: 0



DRESSING - UPPER BODY - SCORE:



0-UNK  



DRESSING - LOWER BODY:



Elastic waist pants (three steps)  



ARTICLES SCORE



Total number of steps: 3



DRESSING - LOWER BODY - STEP 1:



Does the patient require help when dressing below the waist? Yes.



DRESSING - LOWER BODY - STEP 2:



Does the patient require the assistance of a helper? Yes.



DRESSING - LOWER BODY - STEP 3:



Does the helper touch the patient while dressing? Yes.



DRESSING - LOWER BODY - STEP 4:



How many of the total steps does the patient complete on his/her own? 2



DRESSING - LOWER BODY - SCORE:



3-MOD  



TOILETING:



TOILETING - STEP 1:



Does the patient require assistance with toileting? Yes.



TOILETING - STEP 2:



Does the patient require the assistance of a helper? Yes.



TOILETING - STEP 3:



How much assistance does the patient require from the helper? Hands-on assistance from the helper



TOILETING - STEP 4:



Of the 3 tasks: 1) Adjusting clothing prior to use, 2) Cleansing of perineal area,  3) Adjusting clot
mary after use; How many tasks does the patient perform WITHOUT assistance of the helper? Three tasks
 with steadying assistance from the helper



TOILETING - SCORE:



4-MIN  



BLADDER MANAGEMENT:



BLADDER MANAGEMENT - STEP 1:



Does the patient control the bladder completely and intentionally without equipment or devices or med
ications, and is always continent? No.



BLADDER MANAGEMENT - STEP 2:



Does the patient require the assistance of a helper? No, patient requires and independently uses an a
ssistive device, such as a urinal, bedpan, bedside commode, catheter, absorbent pad, or collecting de
vice



BLADDER MANAGEMENT - SCORE:



6-ANUJA  



BOWEL MANAGEMENT:



BOWEL MANAGEMENT - STEP 1:



Does the patient control bowels completely and intentionally without equipment devices or medications
 AND is always continent? No.



BOWEL MANAGEMENT - STEP 2:



Does the patient require the assistance of a helper? No, patient requires and manages independently a
n assistive device such as a bedpan, bedside commode, absorbent pad, incontinent device, or collectin
g device



BOWEL MANAGEMENT - SCORE:



6-ANUJA  



TRANSFERS: BED, CHAIR, WHEELCHAIR:



TRANSFERS: BED, CHAIR, WHEELCHAIR - STEP 1:



Does the patient require assistance with bed, chair, or wheelchair transfers? Yes.



TRANSFERS: BED, CHAIR, WHEELCHAIR - STEP 2:



Does the patient require the assistance of a helper? Yes.



TRANSFERS: BED, CHAIR, WHEELCHAIR - STEP 3:



How much assistance does the patient require from the helper? Steadying/guiding assistance



TRANSFERS: BED, CHAIR, WHEELCHAIR - SCORE:



4-MIN  



TRANSFERS: TOILET:



TRANSFERS: TOILET - STEP 1:



Does the patient require assistance with toilet transfers? Yes.



TRANSFERS: TOILET - STEP 2:



Does the patient require the assistance of a helper? Yes.



TRANSFERS: TOILET - STEP 3:



How much assistance does the patient require from the helper? Patient performs half or more of the tr
ansferring tasks



TRANSFERS: TOILET - STEP 4:



Does the patient need only incidental help such as contact guard or steadying during toilet transfer?
 No. Patient needs more than incidental help



TRANSFERS: TOILET - SCORE:



3-MOD  



TRANSFERS: SHOWER:



Activity did not occur on this shift



TRANSFERS: SHOWER - SCORE:



0-UNK  



TRANSFERS: TUB:



Activity did not occur on this shift



TRANSFERS: TUB - SCORE:



0-UNK  



LOCOMOTION: WALK:



Activity did not occur on this shift



LOCOMOTION: WALK - SCORE:



0-UNK  



LOCOMOTION: WHEELCHAIR:



Activity did not occur on this shift



LOCOMOTION: WHEELCHAIR - SCORE:



0-UNK  



COMPREHENSION:



COMPREHENSION - SCORE:



0-UNK  



EXPRESSION



EXPRESSION - SCORE:



0-UNK  



SOCIAL INTERACTION:



SOCIAL INTERACTION - SCORE:



0-UNK  



PROBLEM SOLVING:



PROBLEM SOLVING - SCORE:



0-UNK  



MEMORY:



MEMORY - SCORE:



0-UNK  



SIGNATURE PANEL:



The following modified sections: Eating - Score, Grooming - Score, Bathing - Score, Dressing - Upper 
Body - Score, Dressing - Lower Body - Score, Toileting - Score, Bladder Management - Score, Bowel Man
agement - Score, Transfers: Bed, Chair, Wheelchair - Score, Transfers: Toilet - Score, Transfers: Fabi
wer - Score, Transfers: Tub - Score, Locomotion: Walk - Score, Locomotion: Wheelchair - Score, Compre
hension - Score, Expression - Score, Social Interaction - Score, Problem Solving - Score, Memory - Sc
ore were [electronically] signed by Cirilo Austin on Sun Jul 15 2018 12:25:48 GMT-0500 (Central Daylight
 Time)

## 2018-07-15 NOTE — FAST
SHIFT START DATE/TIME:



07/14/2018 19:00 (CDT)



SHIFT END DATE/TIME:



07/15/2018 07:00 (CDT)



NAME



ESPERANZA CONLEY



YOB: 1935



DATE OF ADMISSION:



07/05/2018 15:57 (CDT)



PHONE:



[phone]



AGE:



83



N#







GENDER:



Female



ENCOUNTER PHYSICIAN:



Dr. Moris Gonzalez M.D.



ADMISSION DIAGNOSIS:



- Orthopaedic Disorders 08 -  Femur (Shaft) Fracture (08.2)

Left Femoral Neck Fracture. 



EATING:



Activity did not occur on this shift



EATING - SCORE:



0-UNK  



GROOMING:



Activity did not occur on this shift



GROOMING - SCORE:



0-UNK  



BATHING:



Activity did not occur on this shift



BATHING - SCORE:



0-UNK  



DRESSING - UPPER BODY:



Activity did not occur on this shift



ARTICLES SCORE



Total number of steps: 0



DRESSING - UPPER BODY - SCORE:



0-UNK  



DRESSING - LOWER BODY:



Activity did not occur on this shift



ARTICLES SCORE



Total number of steps: 0



DRESSING - LOWER BODY - SCORE:



0-UNK  



TOILETING:



TOILETING - STEP 1:



Does the patient require assistance with toileting? Yes.



TOILETING - STEP 2:



Does the patient require the assistance of a helper? Yes.



TOILETING - STEP 3:



How much assistance does the patient require from the helper? Only supervision



TOILETING - SCORE:



5-SUP  



BLADDER MANAGEMENT:



BLADDER MANAGEMENT - STEP 1:



Does the patient control the bladder completely and intentionally without equipment or devices or med
ications, and is always continent? Yes.



BLADDER MANAGEMENT - SCORE:



7-IND  



BLADDER MANAGEMENT - FREQUENCY OF ACCIDENTS:



BLADDER MANAGEMENT(FA) - STEP 1:



How many accidents has the patient had during the current shift? 0



BOWEL MANAGEMENT:



BOWEL MANAGEMENT - STEP 1:



Does the patient control bowels completely and intentionally without equipment devices or medications
 AND is always continent? Yes.



BOWEL MANAGEMENT - SCORE:



7-IND  



BOWEL MANAGEMENT - FREQUENCY OF ACCIDENTS:



BOWEL MANAGEMENT(FA) - STEP 1:



How many accidents has the patient had during the current shift? 0



TRANSFERS: BED, CHAIR, WHEELCHAIR:



TRANSFERS: BED, CHAIR, WHEELCHAIR - STEP 1:



Does the patient require assistance with bed, chair, or wheelchair transfers? No.



TRANSFERS: BED, CHAIR, WHEELCHAIR - SCORE:



7-IND  



TRANSFERS: TOILET:



TRANSFERS: TOILET - STEP 1:



Does the patient require assistance with toilet transfers? Yes.



TRANSFERS: TOILET - STEP 2:



Does the patient require the assistance of a helper? Yes.



TRANSFERS: TOILET - STEP 3:



How much assistance does the patient require from the helper? Patient performs half or more of the tr
ansferring tasks



TRANSFERS: TOILET - STEP 4:



Does the patient need only incidental help such as contact guard or steadying during toilet transfer?
 Yes.



TRANSFERS: TOILET - SCORE:



4-MIN  



TRANSFERS: SHOWER:



Activity did not occur on this shift



TRANSFERS: SHOWER - SCORE:



0-UNK  



TRANSFERS: TUB:



Activity did not occur on this shift



TRANSFERS: TUB - SCORE:



0-UNK  



LOCOMOTION: WALK:



Activity did not occur on this shift



LOCOMOTION: WALK - SCORE:



0-UNK  



LOCOMOTION: WHEELCHAIR:



Activity did not occur on this shift



LOCOMOTION: WHEELCHAIR - SCORE:



0-UNK  



COMPREHENSION:



COMPREHENSION: TYPE:



Both



COMPREHENSION - STEP 1:



Does the patient require help to understand complex and abstract ideas (such as current events, finan
nora, discharge planning, medical issues, relationships, etc)? No.



COMPREHENSION - STEP 2:



Does the patient need extra time, require an assistive device (such as glasses, hearing aids, or an a
ugmentative communication system), OR does s/he have mild difficulty expressing complex and abstract 
ideas (including mild dysarthria or mild word-finding problems)? No.



COMPREHENSION - SCORE:



7-IND  



EXPRESSION



EXPRESSION: TYPE:



Both



EXPRESSION - STEP 1:



Does the patient require help expressing complex and abstract ideas (such as current events, finances
, discharge planning, medical issues, relationships, etc)? No.



EXPRESSION - STEP 2:



Does the patient need extra time, require an assistive device (such as augmentive communication syste
m or a communication board), OR does s/he have mild difficulty expressing complex and abstract ideas 
(including mild dysarthria or mild word-find problems)? No.



EXPRESSION - SCORE:



7-IND  



SOCIAL INTERACTION:



SOCIAL INTERACTION - STEP 1:



Does the patient require a helper to interact with others in social and therapeutic situations? No.



SOCIAL INTERACTION - STEP 2:



Does the patient need extra time in social situations, OR does s/he interact with staff, other patien
ts, and family members ONLY in structured environments, OR does s/he require medication for social in
teraction? No.



SOCIAL INTERACTION - SCORE:



7-IND  



PROBLEM SOLVING:



PROBLEM SOLVING - STEP 1:



Does the patient need help to solve complex problems such as managing a checking account or confronti
ng interpersonal problems? No.



PROBLEM SOLVING - STEP 2:



Does the patient require extra time to make decisions or solve problems, OR does s/he have slight dif
ficulty reading, initiating, or self-correcting in unfamiliar situations? No.



PROBLEM SOLVING - SCORE:



7-IND  



MEMORY:



MEMORY - STEP 1:



Does the patient need help to remember frequently encountered people, daily routines, and executing r
equests? No.



MEMORY - STEP 2:



Does the patient have slight difficulty recognizing frequently encountered people, daily routines, or
 executing requests without the need for repetition or using self-initiated or environmental cues to 
remember? No.



MEMORY - SCORE:



7-IND  



SIGNATURE PANEL:



The following modified sections: Eating - Score, Grooming - Score, Bathing - Score, Dressing - Upper 
Body - Score, Dressing - Lower Body - Score, Toileting - Score, Bladder Management - Score, Bowel Man
agement - Score, Transfers: Bed, Chair, Wheelchair - Score, Transfers: Toilet - Score, Transfers: Fabi
wer - Score, Transfers: Tub - Score, Locomotion: Walk - Score, Locomotion: Wheelchair - Score, Compre
hension - Score, Expression - Score, Social Interaction - Score, Problem Solving - Score, Memory - Sc
ore were [electronically] signed by Chetna Barrera on Sun Jul 15 2018 02:34:48 GMT-0500 (Central Day
light Time)

## 2018-07-16 LAB
ALBUMIN SERPL BCP-MCNC: 2.2 G/DL (ref 3.4–5)
PREALB SERPL-MCNC: 15.3 MG/DL (ref 20–40)

## 2018-07-16 RX ADMIN — Medication SCH MG: at 08:16

## 2018-07-16 RX ADMIN — LEVOTHYROXINE SODIUM SCH MG: 0.1 TABLET ORAL at 05:25

## 2018-07-16 RX ADMIN — TRAMADOL HYDROCHLORIDE PRN MG: 50 TABLET, COATED ORAL at 08:18

## 2018-07-16 RX ADMIN — Medication SCH MG: at 20:42

## 2018-07-16 RX ADMIN — SENNOSIDES AND DOCUSATE SODIUM SCH TAB: 8.6; 5 TABLET ORAL at 20:46

## 2018-07-16 RX ADMIN — MONTELUKAST SODIUM SCH MG: 10 TABLET, FILM COATED ORAL at 08:17

## 2018-07-16 RX ADMIN — BISOPROLOL FUMARATE SCH MG: 5 TABLET, COATED ORAL at 08:18

## 2018-07-16 RX ADMIN — Medication SCH ML: at 20:46

## 2018-07-16 RX ADMIN — TRAMADOL HYDROCHLORIDE PRN MG: 50 TABLET, COATED ORAL at 20:42

## 2018-07-16 RX ADMIN — ISOSORBIDE MONONITRATE SCH MG: 30 TABLET, EXTENDED RELEASE ORAL at 08:18

## 2018-07-16 RX ADMIN — SERTRALINE HYDROCHLORIDE SCH MG: 100 TABLET ORAL at 08:17

## 2018-07-16 RX ADMIN — GABAPENTIN SCH MG: 300 CAPSULE ORAL at 08:18

## 2018-07-16 RX ADMIN — HYDROCODONE BITARTRATE AND ACETAMINOPHEN PRN TAB: 10; 325 TABLET ORAL at 10:12

## 2018-07-16 RX ADMIN — NIFEDIPINE SCH MG: 30 TABLET, FILM COATED, EXTENDED RELEASE ORAL at 20:45

## 2018-07-16 RX ADMIN — DOXEPIN HYDROCHLORIDE SCH MG: 10 CAPSULE ORAL at 08:17

## 2018-07-16 RX ADMIN — Medication SCH ML: at 08:19

## 2018-07-16 RX ADMIN — Medication SCH PATCH: at 08:16

## 2018-07-16 RX ADMIN — Medication SCH ML: at 10:12

## 2018-07-16 RX ADMIN — IRON SUPPLEMENT SCH MG: 325 TABLET ORAL at 08:17

## 2018-07-16 RX ADMIN — GABAPENTIN SCH MG: 300 CAPSULE ORAL at 20:42

## 2018-07-16 RX ADMIN — ENOXAPARIN SODIUM SCH MG: 30 INJECTION SUBCUTANEOUS at 17:02

## 2018-07-16 RX ADMIN — AMIODARONE HYDROCHLORIDE SCH MG: 200 TABLET ORAL at 08:17

## 2018-07-16 RX ADMIN — Medication SCH TAB: at 08:17

## 2018-07-16 RX ADMIN — PANTOPRAZOLE SODIUM SCH MG: 40 TABLET, DELAYED RELEASE ORAL at 08:18

## 2018-07-16 RX ADMIN — MELATONIN PRN MG: 3 TAB ORAL at 20:42

## 2018-07-16 NOTE — FAST
SHIFT START DATE/TIME:



07/15/2018 19:00 (CDT)



SHIFT END DATE/TIME:



07/16/2018 07:00 (CDT)



NAME



ESPERANZA CONLEY



YOB: 1935



DATE OF ADMISSION:



07/05/2018 15:57 (CDT)



PHONE:



[phone]



AGE:



83



N#







GENDER:



Female



ENCOUNTER PHYSICIAN:



Dr. Moris Gonzalez M.D.



ADMISSION DIAGNOSIS:



- Orthopaedic Disorders 08 -  Femur (Shaft) Fracture (08.2)

Left Femoral Neck Fracture. 



EATING:



Activity did not occur on this shift



EATING - SCORE:



0-UNK  



GROOMING:



Activity did not occur on this shift



GROOMING - SCORE:



0-UNK  



BATHING:



Activity did not occur on this shift



BATHING - SCORE:



0-UNK  



DRESSING - UPPER BODY:



Activity did not occur on this shift



ARTICLES SCORE



Total number of steps: 0



DRESSING - UPPER BODY - SCORE:



0-UNK  



DRESSING - LOWER BODY:



Activity did not occur on this shift



ARTICLES SCORE



Total number of steps: 0



DRESSING - LOWER BODY - SCORE:



0-UNK  



TOILETING:



TOILETING - STEP 1:



Does the patient require assistance with toileting? Yes.



TOILETING - STEP 2:



Does the patient require the assistance of a helper? Yes.



TOILETING - STEP 3:



How much assistance does the patient require from the helper? Only supervision



TOILETING - SCORE:



5-SUP  



BLADDER MANAGEMENT:



BLADDER MANAGEMENT - STEP 1:



Does the patient control the bladder completely and intentionally without equipment or devices or med
ications, and is always continent? Yes.



BLADDER MANAGEMENT - SCORE:



7-IND  



BLADDER MANAGEMENT - FREQUENCY OF ACCIDENTS:



BLADDER MANAGEMENT(FA) - STEP 1:



How many accidents has the patient had during the current shift? 0



BOWEL MANAGEMENT:



BOWEL MANAGEMENT - STEP 1:



Does the patient control bowels completely and intentionally without equipment devices or medications
 AND is always continent? Yes.



BOWEL MANAGEMENT - SCORE:



7-IND  



BOWEL MANAGEMENT - FREQUENCY OF ACCIDENTS:



BOWEL MANAGEMENT(FA) - STEP 1:



How many accidents has the patient had during the current shift? 0



TRANSFERS: BED, CHAIR, WHEELCHAIR:



TRANSFERS: BED, CHAIR, WHEELCHAIR - STEP 1:



Does the patient require assistance with bed, chair, or wheelchair transfers? Yes.



TRANSFERS: BED, CHAIR, WHEELCHAIR - STEP 2:



Does the patient require the assistance of a helper? Yes.



TRANSFERS: BED, CHAIR, WHEELCHAIR - STEP 3:



How much assistance does the patient require from the helper? Steadying/guiding assistance



TRANSFERS: BED, CHAIR, WHEELCHAIR - SCORE:



4-MIN  



TRANSFERS: TOILET:



TRANSFERS: TOILET - STEP 1:



Does the patient require assistance with toilet transfers? Yes.



TRANSFERS: TOILET - STEP 2:



Does the patient require the assistance of a helper? Yes.



TRANSFERS: TOILET - STEP 3:



How much assistance does the patient require from the helper? Only supervision, cuing, coaxing, OR he
lp to set out transfer equipment or to lock brakes and/or lift foot rests



TRANSFERS: TOILET - SCORE:



5-SUP  



TRANSFERS: SHOWER:



Activity did not occur on this shift



TRANSFERS: SHOWER - SCORE:



0-UNK  



TRANSFERS: TUB:



Activity did not occur on this shift



TRANSFERS: TUB - SCORE:



0-UNK  



LOCOMOTION: WALK:



Activity did not occur on this shift



LOCOMOTION: WALK - SCORE:



0-UNK  



LOCOMOTION: WHEELCHAIR:



Activity did not occur on this shift



LOCOMOTION: WHEELCHAIR - SCORE:



0-UNK  



COMPREHENSION:



COMPREHENSION: TYPE:



Both



COMPREHENSION - STEP 1:



Does the patient require help to understand complex and abstract ideas (such as current events, finan
nora, discharge planning, medical issues, relationships, etc)? Yes.



COMPREHENSION - STEP 2:



Does the patient require help to understand questions or statements about basic needs or ideas (such 
as hunger, thirst, sleep, safety, daily schedule, room location, or discomfort) half or more of the t
saleem? Yes.



COMPREHENSION - STEP 3:



Is the patient basically able to understand and respond appropriately and consistently? No, patient i
s basically UNABLE to understand, OR responds inappropriately/inconsistently despite prompting



COMPREHENSION - SCORE:



1-DEP  



EXPRESSION



EXPRESSION: TYPE:



Both



EXPRESSION - STEP 1:



Does the patient require help expressing complex and abstract ideas (such as current events, finances
, discharge planning, medical issues, relationships, etc)? Yes.



EXPRESSION - STEP 2:



Does the patient require help to express basic necessities or ideas (such as hunger, thirst, sleep, s
afety, daily schedule, room location, or discomfort) half or more of the time? Yes.



EXPRESSION - STEP 3:



Is the patient basically unable to express or does s/he express inappropriately or inconsistently shashank
pite prompting? No.



EXPRESSION - SCORE:



2-MAX  



SOCIAL INTERACTION:



SOCIAL INTERACTION - STEP 1:



Does the patient require a helper to interact with others in social and therapeutic situations? Yes.



SOCIAL INTERACTION - STEP 2:



Does the patient interact appropriately half or more of the time? Yes.



SOCIAL INTERACTION - STEP 3:



How often does the patient need help to interact appropriately? Less than 10% of the time



SOCIAL INTERACTION - SCORE:



5-SUP  



PROBLEM SOLVING:



PROBLEM SOLVING - STEP 1:



Does the patient need help to solve complex problems such as managing a checking account or confronti
ng interpersonal problems? Yes.



PROBLEM SOLVING - STEP 2:



Does the patient solve basic routine problems half or more of the time? Yes.



PROBLEM SOLVING - STEP 3:



How often does the patient need help to solve basic routine problems? Less than 10% of the time



PROBLEM SOLVING - SCORE:



5-SUP  



MEMORY:



MEMORY - STEP 1:



Does the patient need help to remember frequently encountered people, daily routines, and executing r
equests? Yes.



MEMORY - STEP 2:



How often does the patient need help to remember frequently encountered people, daily routines, and e
xecuting requests? Less than 10% of the time



MEMORY - SCORE:



5-SUP  



SIGNATURE PANEL:



The following modified sections: Eating - Score, Grooming - Score, Bathing - Score, Dressing - Upper 
Body - Score, Dressing - Lower Body - Score, Toileting - Score, Bladder Management - Score, Bowel Man
agement - Score, Transfers: Bed, Chair, Wheelchair - Score, Transfers: Toilet - Score, Transfers: Fabi
wer - Score, Transfers: Tub - Score, Locomotion: Walk - Score, Locomotion: Wheelchair - Score, Compre
hension - Score, Expression - Score, Social Interaction - Score, Problem Solving - Score, Memory - Sc
ore were [electronically] signed by Chetna Barrera on Mon Jul 16 2018 02:27:44 GMT-0500 (Central Day
light Time)

## 2018-07-16 NOTE — FAST
ENCOUNTER DATE AND TIME:



07/16/2018 08:00 (CDT)



NAME



ESPERANZA CONLEY



YOB: 1935



DATE OF ADMISSION:



07/05/2018 15:57 (CDT)



PHONE:



[phone]



AGE:



83



N#







GENDER:



Female



ENCOUNTER PHYSICIAN:



Dr. Moris Gonzalez M.D.



ADMISSION DIAGNOSIS:



- Orthopaedic Disorders 08 -  Femur (Shaft) Fracture (08.2)

Left Femoral Neck Fracture. 



EATING:



Activity did not occur on this shift



EATING - SCORE:



0-UNK  



GROOMING:



Activity did not occur on this shift



GROOMING - SCORE:



0-UNK  



BATHING:



Activity did not occur on this shift



BATHING - SCORE:



0-UNK  



DRESSING - UPPER BODY:



Activity did not occur on this shift

Patient is not dressing in public clothing



ARTICLES SCORE



Total number of steps: 0



DRESSING - UPPER BODY - SCORE:



0-UNK  



DRESSING - LOWER BODY:



Activity did not occur on this shift

Patient is not dressing in public clothing



ARTICLES SCORE



Total number of steps: 0



DRESSING - LOWER BODY - SCORE:



0-UNK  



TOILETING:



Activity did not occur on this shift



TOILETING - SCORE:



0-UNK  



BLADDER MANAGEMENT:



Activity did not occur on this shift



BLADDER MANAGEMENT - SCORE:



7-IND  



BOWEL MANAGEMENT:



Activity did not occur on this shift



BOWEL MANAGEMENT - SCORE:



7-IND  



TRANSFERS: BED, CHAIR, WHEELCHAIR:



Activity did not occur on this shift



TRANSFERS: BED, CHAIR, WHEELCHAIR - SCORE:



0-UNK  



TRANSFERS: TOILET:



Activity did not occur on this shift



TRANSFERS: TOILET - SCORE:



0-UNK  



TRANSFERS: SHOWER:



Activity did not occur on this shift



TRANSFERS: SHOWER - SCORE:



0-UNK  



TRANSFERS: TUB:



Activity did not occur on this shift



TRANSFERS: TUB - SCORE:



0-UNK  



LOCOMOTION: WALK:



Activity did not occur on this shift



LOCOMOTION: WALK - SCORE:



0-UNK  



LOCOMOTION: WHEELCHAIR:



Activity did not occur on this shift



LOCOMOTION: WHEELCHAIR - SCORE:



0-UNK  



LOCOMOTION: STAIRS:



Activity did not occur on this shift



LOCOMOTION: STAIRS - SCORE:



0-UNK  



COMPREHENSION:



COMPREHENSION - STEP 1:



Does the patient require help to understand complex and abstract ideas (such as current events, finan
nora, discharge planning, medical issues, relationships, etc)? No.



COMPREHENSION - STEP 2:



Does the patient need extra time, require an assistive device (such as glasses, hearing aids, or an a
ugmentative communication system), OR does s/he have mild difficulty expressing complex and abstract 
ideas (including mild dysarthria or mild word-finding problems)? Yes.



COMPREHENSION - SCORE:



6-ANUJA  



EXPRESSION



EXPRESSION - STEP 1:



Does the patient require help expressing complex and abstract ideas (such as current events, finances
, discharge planning, medical issues, relationships, etc)? No.



EXPRESSION - STEP 2:



Does the patient need extra time, require an assistive device (such as augmentive communication syste
m or a communication board), OR does s/he have mild difficulty expressing complex and abstract ideas 
(including mild dysarthria or mild word-find problems)? Yes.



EXPRESSION - SCORE:



6-ANUJA  



SOCIAL INTERACTION:



SOCIAL INTERACTION - STEP 1:



Does the patient require a helper to interact with others in social and therapeutic situations? No.



SOCIAL INTERACTION - STEP 2:



Does the patient need extra time in social situations, OR does s/he interact with staff, other patien
ts, and family members ONLY in structured environments, OR does s/he require medication for social in
teraction? Yes, patient needs extra time



SOCIAL INTERACTION - SCORE:



6-ANUJA  



PROBLEM SOLVING:



PROBLEM SOLVING - STEP 1:



Does the patient need help to solve complex problems such as managing a checking account or confronti
ng interpersonal problems? Yes.



PROBLEM SOLVING - STEP 2:



Does the patient solve basic routine problems half or more of the time? Yes.



PROBLEM SOLVING - STEP 3:



How often does the patient need help to solve basic routine problems? 10%-24% of the time



PROBLEM SOLVING - SCORE:



4-MIN  



MEMORY:



MEMORY - STEP 1:



Does the patient need help to remember frequently encountered people, daily routines, and executing r
equests? Yes.



MEMORY - STEP 2:



How often does the patient need help to remember frequently encountered people, daily routines, and e
xecuting requests? 10% - 24% of the time



MEMORY - SCORE:



4-MIN  



SIGNATURE PANEL:



The following modified sections: Comprehension - Score, Expression - Score, Social Interaction - Scor
e, Problem Solving - Score, Memory - Score were [electronically] signed by ST Jj on Mon Ju
l 16 2018 15:36:25 GMT-0500 (Central Daylight Time)

## 2018-07-16 NOTE — FAST
SHIFT START DATE/TIME:



07/16/2018 07:00 (CDT)



SHIFT END DATE/TIME:



07/16/2018 19:00 (CDT)



NAME



ESPERANZA CONLEY



YOB: 1935



DATE OF ADMISSION:



07/05/2018 15:57 (CDT)



PHONE:



[phone]



AGE:



83



N#







GENDER:



Female



ENCOUNTER PHYSICIAN:



Dr. Moris Gonzalez M.D.



ADMISSION DIAGNOSIS:



- Orthopaedic Disorders 08 -  Femur (Shaft) Fracture (08.2)

Left Femoral Neck Fracture. 



EATING:



EATING - STEP 1:



Does the patient require assistance when eating? Yes.



EATING - STEP 2:



Does the patient require the assistance of a helper? Yes.



EATING - STEP 3:



Does the patient perform half or more of the eating tasks? Yes.



EATING - STEP 4:



Does the patient need only supervision, cuing, coaxing OR help to apply an orthosis OR help to cut fo
od, open containers, pour liquids, or butter bread? Yes.



EATING - SCORE:



5-SUP  



GROOMING:



Comb/brush hair

Oral care

Wash, rinse, and dry face

Wash, rinse, and dry hands



GROOMING - STEP 1:



Does the patient require assistance when grooming? Yes.



GROOMING - STEP 2:



Does the patient require the assistance of a helper? No. The patient only requires an assistive devic
e, OR takes more than reasonable time to groom, OR there is a concern for safety as the patient groom
s



GROOMING - SCORE:



6-ANUJA  



BATHING:



Activity did not occur on this shift



BATHING - SCORE:



0-UNK  



DRESSING - UPPER BODY:



Activity did not occur on this shift



ARTICLES SCORE



Total number of steps: 0



DRESSING - UPPER BODY - SCORE:



0-UNK  



DRESSING - LOWER BODY:



Elastic waist pants (three steps)  



ARTICLES SCORE



Total number of steps: 3



DRESSING - LOWER BODY - STEP 1:



Does the patient require help when dressing below the waist? Yes.



DRESSING - LOWER BODY - STEP 2:



Does the patient require the assistance of a helper? Yes.



DRESSING - LOWER BODY - STEP 3:



Does the helper touch the patient while dressing? Yes.



DRESSING - LOWER BODY - STEP 4:



How many of the total steps does the patient complete on his/her own? 2



DRESSING - LOWER BODY - SCORE:



3-MOD  



TOILETING:



TOILETING - STEP 1:



Does the patient require assistance with toileting? Yes.



TOILETING - STEP 2:



Does the patient require the assistance of a helper? Yes.



TOILETING - STEP 3:



How much assistance does the patient require from the helper? Hands-on assistance from the helper



TOILETING - STEP 4:



Of the 3 tasks: 1) Adjusting clothing prior to use, 2) Cleansing of perineal area,  3) Adjusting clot
mary after use; How many tasks does the patient perform WITHOUT assistance of the helper? Three tasks
 with steadying assistance from the helper



TOILETING - SCORE:



4-MIN  



BLADDER MANAGEMENT:



BLADDER MANAGEMENT - STEP 1:



Does the patient control the bladder completely and intentionally without equipment or devices or med
ications, and is always continent? No.



BLADDER MANAGEMENT - STEP 2:



Does the patient require the assistance of a helper? No, patient only requires extra time



BLADDER MANAGEMENT - SCORE:



6-ANUJA  



BLADDER MANAGEMENT - FREQUENCY OF ACCIDENTS:



BLADDER MANAGEMENT(FA) - STEP 1:



How many accidents has the patient had during the current shift? 0



BOWEL MANAGEMENT:



BOWEL MANAGEMENT - STEP 1:



Does the patient control bowels completely and intentionally without equipment devices or medications
 AND is always continent? No.



BOWEL MANAGEMENT - STEP 2:



Does the patient require the assistance of a helper? Yes.



BOWEL MANAGEMENT - STEP 3:



How much assistance does the patient require from the helper? Patient requires supervision, stand by,
 cueing, coaxing, or setup of equipment - placing within reach of patient and emptying device / bedpa
nd or BSC bucket - to maintain either satisfactory bowel pattern or managing an external device such 
as an absorbent pad, colostomy bag / ileostomy bag



BOWEL MANAGEMENT - SCORE:



5-SUP  



BOWEL MANAGEMENT - FREQUENCY OF ACCIDENTS:



BOWEL MANAGEMENT(FA) - STEP 1:



How many accidents has the patient had during the current shift? 0



TRANSFERS: BED, CHAIR, WHEELCHAIR:



TRANSFERS: BED, CHAIR, WHEELCHAIR - STEP 1:



Does the patient require assistance with bed, chair, or wheelchair transfers? Yes.



TRANSFERS: BED, CHAIR, WHEELCHAIR - STEP 2:



Does the patient require the assistance of a helper? Yes.



TRANSFERS: BED, CHAIR, WHEELCHAIR - STEP 3:



How much assistance does the patient require from the helper? Steadying/guiding assistance



TRANSFERS: BED, CHAIR, WHEELCHAIR - SCORE:



4-MIN  



TRANSFERS: TOILET:



TRANSFERS: TOILET - STEP 1:



Does the patient require assistance with toilet transfers? Yes.



TRANSFERS: TOILET - STEP 2:



Does the patient require the assistance of a helper? Yes.



TRANSFERS: TOILET - STEP 3:



How much assistance does the patient require from the helper? Patient performs half or more of the tr
ansferring tasks



TRANSFERS: TOILET - STEP 4:



Does the patient need only incidental help such as contact guard or steadying during toilet transfer?
 Yes.



TRANSFERS: TOILET - SCORE:



4-MIN  



TRANSFERS: SHOWER:



Activity did not occur on this shift



TRANSFERS: SHOWER - SCORE:



0-UNK  



TRANSFERS: TUB:



Activity did not occur on this shift



TRANSFERS: TUB - SCORE:



0-UNK  



LOCOMOTION: WALK:



Activity did not occur on this shift



LOCOMOTION: WALK - SCORE:



0-UNK  



LOCOMOTION: WHEELCHAIR:



Activity did not occur on this shift



LOCOMOTION: WHEELCHAIR - SCORE:



0-UNK  



COMPREHENSION:



COMPREHENSION: TYPE:



Both



COMPREHENSION - STEP 1:



Does the patient require help to understand complex and abstract ideas (such as current events, finan
nora, discharge planning, medical issues, relationships, etc)? Yes.



COMPREHENSION - STEP 2:



Does the patient require help to understand questions or statements about basic needs or ideas (such 
as hunger, thirst, sleep, safety, daily schedule, room location, or discomfort) half or more of the t
saleem? Yes.



COMPREHENSION - STEP 3:



Is the patient basically able to understand and respond appropriately and consistently? Yes.



COMPREHENSION - SCORE:



2-MAX  



EXPRESSION



EXPRESSION: TYPE:



Both



EXPRESSION - STEP 1:



Does the patient require help expressing complex and abstract ideas (such as current events, finances
, discharge planning, medical issues, relationships, etc)? Yes.



EXPRESSION - STEP 2:



Does the patient require help to express basic necessities or ideas (such as hunger, thirst, sleep, s
afety, daily schedule, room location, or discomfort) half or more of the time? No.



EXPRESSION - STEP 3:



How often does the patient need help to express directions and conversation about basic needs? Less t
mercedes 10% of the time



EXPRESSION - SCORE:



5-SUP  



SOCIAL INTERACTION:



SOCIAL INTERACTION - STEP 1:



Does the patient require a helper to interact with others in social and therapeutic situations? No.



SOCIAL INTERACTION - STEP 2:



Does the patient need extra time in social situations, OR does s/he interact with staff, other patien
ts, and family members ONLY in structured environments, OR does s/he require medication for social in
teraction? Yes, patient needs extra time



SOCIAL INTERACTION - SCORE:



6-ANUJA  



PROBLEM SOLVING:



PROBLEM SOLVING - STEP 1:



Does the patient need help to solve complex problems such as managing a checking account or confronti
ng interpersonal problems? Yes.



PROBLEM SOLVING - STEP 2:



Does the patient solve basic routine problems half or more of the time? Yes.



PROBLEM SOLVING - STEP 3:



How often does the patient need help to solve basic routine problems? Less than 10% of the time



PROBLEM SOLVING - SCORE:



5-SUP  



MEMORY:



MEMORY - STEP 1:



Does the patient need help to remember frequently encountered people, daily routines, and executing r
equests? Yes.



MEMORY - STEP 2:



How often does the patient need help to remember frequently encountered people, daily routines, and e
xecuting requests? Less than 10% of the time



MEMORY - SCORE:



5-SUP  



SIGNATURE PANEL:



The following modified sections: Eating - Score, Grooming - Score, Bathing - Score, Dressing - Upper 
Body - Score, Dressing - Lower Body - Score, Toileting - Score, Bladder Management - Score, Bowel Man
agement - Score, Transfers: Bed, Chair, Wheelchair - Score, Transfers: Toilet - Score, Transfers: Fabi
wer - Score, Transfers: Tub - Score, Locomotion: Walk - Score, Locomotion: Wheelchair - Score, Compre
hension - Score, Expression - Score, Social Interaction - Score, Problem Solving - Score, Memory - Sc
ore were [electronically] signed by Kat Kenny C.N.A. on Mon Jul 16 2018 13:53:30 T-0500 (Centra
l Daylight Time)

## 2018-07-16 NOTE — FAST
ENCOUNTER DATE AND TIME:



07/16/2018 08:00 (CDT)



NAME



ESPERANZA CONLEY



YOB: 1935



DATE OF ADMISSION:



07/05/2018 15:57 (CDT)



PHONE:



[phone]



AGE:



83



N#







GENDER:



Female



ENCOUNTER PHYSICIAN:



Dr. Moris Gonzalez M.D.



ADMISSION DIAGNOSIS:



- Orthopaedic Disorders 08 -  Femur (Shaft) Fracture (08.2)

Left Femoral Neck Fracture. 



EATING:



Activity did not occur on this shift



EATING - SCORE:



0-UNK  



GROOMING:



Activity did not occur on this shift



GROOMING - SCORE:



0-UNK  



BATHING:



Activity did not occur on this shift



BATHING - SCORE:



0-UNK  



DRESSING - UPPER BODY:



Activity did not occur on this shift



ARTICLES SCORE



Total number of steps: 0



DRESSING - UPPER BODY - SCORE:



0-UNK  



DRESSING - LOWER BODY:



Sock - Left foot (one step)  

Sock - Right foot (one step)  



ARTICLES SCORE



Total number of steps: 2



DRESSING - LOWER BODY - STEP 1:



Does the patient require help when dressing below the waist? Yes.



DRESSING - LOWER BODY - STEP 2:



Does the patient require the assistance of a helper? Yes.



DRESSING - LOWER BODY - STEP 3:



Does the helper touch the patient while dressing? No.



DRESSING - LOWER BODY - SCORE:



5-SUP  



TOILETING:



Activity did not occur on this shift



TOILETING - SCORE:



0-UNK  



BLADDER MANAGEMENT:



Activity did not occur on this shift



BLADDER MANAGEMENT - SCORE:



7-IND  



BOWEL MANAGEMENT:



Activity did not occur on this shift



BOWEL MANAGEMENT - SCORE:



7-IND  



TRANSFERS: BED, CHAIR, WHEELCHAIR:



Activity did not occur on this shift



TRANSFERS: BED, CHAIR, WHEELCHAIR - SCORE:



0-UNK  



TRANSFERS: TOILET:



Activity did not occur on this shift



TRANSFERS: TOILET - SCORE:



0-UNK  



TRANSFERS: SHOWER:



Activity did not occur on this shift



TRANSFERS: SHOWER - SCORE:



0-UNK  



TRANSFERS: TUB:



Activity did not occur on this shift



TRANSFERS: TUB - SCORE:



0-UNK  



LOCOMOTION: WALK:



Activity did not occur on this shift



LOCOMOTION: WALK - SCORE:



0-UNK  



LOCOMOTION: WHEELCHAIR:



Activity did not occur on this shift



LOCOMOTION: WHEELCHAIR - SCORE:



0-UNK  



LOCOMOTION: STAIRS:



Activity did not occur on this shift



LOCOMOTION: STAIRS - SCORE:



0-UNK  



COMPREHENSION:



COMPREHENSION - SCORE:



0-UNK  



EXPRESSION



EXPRESSION - SCORE:



0-UNK  



SOCIAL INTERACTION:



SOCIAL INTERACTION - SCORE:



0-UNK  



PROBLEM SOLVING:



PROBLEM SOLVING - SCORE:



0-UNK  



MEMORY:



MEMORY - SCORE:



0-UNK  



SIGNATURE PANEL:



The following modified sections: Eating - Score, Grooming - Score, Bathing - Score, Dressing - Upper 
Body - Score, Dressing - Lower Body - Score, Toileting - Score, Transfers: Bed, Chair, Wheelchair - S
core, Transfers: Toilet - Score, Transfers: Shower - Score, Transfers: Tub - Score, Comprehension - S
core, Expression - Score, Social Interaction - Score, Problem Solving - Score, Memory - Score were [e
lectronically] signed by AKASH Harvey on Mon Jul 16 2018 12:26:45 T-0500 (Central Daylig
ht Time)

## 2018-07-16 NOTE — R.PN
ENCOUNTER DATE AND TIME:



07/16/2018 20:26 (CDT)



NAME



ESPERANZA CONLEY



YOB: 1935



DATE OF ADMISSION:



07/05/2018 15:57 (CDT)



Left Femoral Neck FractureCHIEF COMPLAINT:



Left hip fracture.



SUBJECTIVE:



Pt denied any Shortness of Breath.

Pt denied any depression.

Ambulated 105' using a hemiwalker with contact guard assistance. Self-propelled wheelchair 250' with 
standby assistance.



VITAL SIGNS



Temperature: 97.4 F

SBP/DBP: 154/68

Pulse: 70

Resp: 16

Activities of daily living was done with standby assistance to modified independence.



MEDICATION ALLERGIES:



CODEINE

CEPHALEXIN



ENVIRONMENTAL ALLERGIES:





None Known

- Substance Allergies

None Known

- Other Allergies

None Known



NURSING:



- Shower

allowing shower

- Skin

care per protocol



PRECAUTIONS:



- Weight Bearing Precaution

WBAT left LE

NWB Left UE

- Fall Precaution

Bed and chair alarm



ACTIVITIES



OOB only with supervision



THERAPIES:



- Occupational Therapy

Evaluate and Treat. 



- Physical Therapy

Evaluate and Treat. 



PHYSICAL EXAM



- Gen

Alert and awake

Lying in bed

No apparent distress

Oriented to: person, time, and place

- Skin

No skin breakdown.



Normacephalic

- Eyes

No abnormalities

- ENMT

No abnormalities

- Neck

No abnormalities

- CVS

RRR

- Chest

Clear

- Abd

Soft

- GI

Non distended



Deferred

- 

No abnormalities

- Ext

Mild postoperative edema in the left lower extremity.

- MSK

4+/5 weakness in left lower extremity

- Neuro

4/5 strength left lower extremities.

- Psych

No abnormalities



ASSESSMENT:



Pt. is a 82 yo Right-handed white female.Her impairment category is Orthopaedic Disorders 08 -  Femur
 (Shaft) Fracture (08.2).Pre-morbidly, Pt. was independent/mod-I in Social Cognition, Self-Care, Loco
motion, Sphincter Control, Transfers Control, and Communication; and she had good Sphincter Control.C
urrently, she has deficits of Balance, Self-Care, Locomotion, Endurance, Safety Awareness, and Transf
ers Control.Pt. is now [facility] for acute in-patient rehabilitat
ion in order to maximize patient's functional independence in activities of daily living, strength, R
OM, and mobility.- Rehab Goal

Patient has realistic goal of being discharged at assistance level 6-Brian to reside at Home with  Fam
russell/Relatives.

MDM/PLAN:



- Diet Type

Continue Regular

- Physical Therapy

 Decreased range of motion - to improve, our physical therapists will perform initial evaluation of p
t's status upon admission and devise an individualized program for increasing patient's Range of Miguel
on.

 Gait dysfunction - to improve, our physical therapists will perform initial evaluation of pt's statu
s upon admission and devise an individualized program for Gait Training, and Wheel Chair mobility

 Inability to transfer - to improve, our physical therapists will perform initial evaluation of pt's 
status upon admission and devise an individualized program for Bed mobility

 Need for home safety evaluation - to improve, our physical therapists will perform initial evaluatio
n of pt's status upon admission and devise an individualized program for Home Evaluation

 Need in caregiver upon discharge - to improve, our physical therapists will perform initial evaluati
on of pt's status upon admission and devise an individualized program for Caregiver Training

 Edema - to improve, our physical therapists will perform initial evaluation of pt's status upon admi
ssion and devise an individualized program for Elevation Training, and Lymphedema Therapy

 New precaution - to improve, our physical therapists will perform initial evaluation of pt's status 
upon admission and devise an individualized program for Patient precaution education

 Poor balance - to improve, our physical therapists will perform initial evaluation of pt's status up
on admission and devise an individualized program for Balance Training

 Poor endurance - to improve, our physical therapists will perform initial evaluation of pt's status 
upon admission and devise an individualized program for Endurance Training

 Weakness - to improve, our physical therapists will perform initial evaluation of pt's status upon a
dmission and devise an individualized program for Aquatic Therapy, Neuromuscular Reeducation, and Str
engthening

 Achieving independence - to improve, our physical therapists will perform initial evaluation of pt's
 status upon admission and devise an individualized program for Community Reintegration Activities

- Diet - Liquid Texture

Continue Regular

- Tube Feed

Continue N/A

- Weight Bearing Precaution

 WBAT left LE

 NWB Left UE

- Fall Precaution

 Bed and chair alarm

- Skin

 care per protocol

- Diet - Solid Texture

Continue Regular

- Shower

 allowing shower

- Occupational Therapy

 ADL deficits - to improve, our occupation therapists will perform initial evaluation of pt's status 
upon admission and devise an individualized program for Bathing, Bed mobility, Community Reintegratio
n, Cooking, Dressing, Eating, Fine Motor Skills, Grooming, Homemaking, Kitchen Mobility, Laundry, Pat
ient Education, Safety Awareness, Splinting - Positioning, Transfers(Toilet, Tub, Shower), and Wheel 
Chair Management

 Need for care giver - to improve, our occupation therapists will perform initial evaluation of pt's 
status upon admission and devise an individualized program for Caregiver Training

 Weakness - to improve, our occupation therapists will perform initial evaluation of pt's status upon
 admission and devise an individualized program for Aquatic Therapy, Balance, Endurance, UE ROM, and 
UE strengthening



FUNCTIONAL STATUS: UPDATED AT WEEKLY TEAM CONFERENCE



- Bladder

Same accident frequency: 7-Ind - No accidents in the past 7 days

- Bowel

Same accident frequency: 7-Ind - No accidents in the past 7 days

- Walking

Same score based on distance walked: 0(N/A)

- Wheelchair

Same score based on distance traveled: 0(N/A)



FUNCTIONAL STATUS:



- Self-Care

A. Eating    sup   

B. Grooming    sup   

C. Bathing    sup   

D. Dressing - Upper     Ben   

E. Dressing - Lower     Ben   

F. Toileting    Ben   

- Sphincter Control

G: Bladder control     Ind   

H: Bowel control     Ind   

- Transfers Control

I. Bed/Chair/Wheelchair     maxA   

J. Toilet    maxA   

K. Tub/Shower    ADNO   

- Locomotion

L. Walk/Wheelchair (C)     maxA   

L. Walk/Wheelchair (W)     maxA   

M. Stairs    ADNO   

- Communication

N. Comprehension (B)     Ind   

O. Expression (B)     Ind   

- Social Cognition

P. Social Interaction    Ind   

Q. Problem Solving    Ind   

R. Memory    Ind   

- Endurance

Fair

- Balance

Fair

- Safety Awareness

Fair



CURRENT FUNC. DEFICITS:



Balance, Self-Care, Locomotion, Endurance, Safety Awareness, and Transfers Control



SIGNATURE PANEL:



Electronically signed by Dr. Moris Gonzalez M.D. on 07/16/2018 at 20:31 (CDT)

## 2018-07-17 RX ADMIN — ISOSORBIDE MONONITRATE SCH MG: 30 TABLET, EXTENDED RELEASE ORAL at 08:22

## 2018-07-17 RX ADMIN — Medication SCH PATCH: at 08:20

## 2018-07-17 RX ADMIN — BISOPROLOL FUMARATE SCH MG: 5 TABLET, COATED ORAL at 08:21

## 2018-07-17 RX ADMIN — AMIODARONE HYDROCHLORIDE SCH MG: 200 TABLET ORAL at 08:22

## 2018-07-17 RX ADMIN — ENOXAPARIN SODIUM SCH MG: 30 INJECTION SUBCUTANEOUS at 16:53

## 2018-07-17 RX ADMIN — PANTOPRAZOLE SODIUM SCH MG: 40 TABLET, DELAYED RELEASE ORAL at 08:22

## 2018-07-17 RX ADMIN — Medication SCH ML: at 20:30

## 2018-07-17 RX ADMIN — MELATONIN PRN MG: 3 TAB ORAL at 20:29

## 2018-07-17 RX ADMIN — Medication SCH TAB: at 08:21

## 2018-07-17 RX ADMIN — TRAMADOL HYDROCHLORIDE PRN MG: 50 TABLET, COATED ORAL at 08:28

## 2018-07-17 RX ADMIN — SENNOSIDES AND DOCUSATE SODIUM SCH TAB: 8.6; 5 TABLET ORAL at 20:28

## 2018-07-17 RX ADMIN — TRAMADOL HYDROCHLORIDE PRN MG: 50 TABLET, COATED ORAL at 20:29

## 2018-07-17 RX ADMIN — SERTRALINE HYDROCHLORIDE SCH MG: 100 TABLET ORAL at 08:22

## 2018-07-17 RX ADMIN — MONTELUKAST SODIUM SCH MG: 10 TABLET, FILM COATED ORAL at 08:21

## 2018-07-17 RX ADMIN — Medication SCH ML: at 08:22

## 2018-07-17 RX ADMIN — DOXEPIN HYDROCHLORIDE SCH MG: 10 CAPSULE ORAL at 08:21

## 2018-07-17 RX ADMIN — LEVOTHYROXINE SODIUM SCH MG: 0.1 TABLET ORAL at 05:14

## 2018-07-17 RX ADMIN — NIFEDIPINE SCH MG: 30 TABLET, FILM COATED, EXTENDED RELEASE ORAL at 20:29

## 2018-07-17 RX ADMIN — Medication SCH MG: at 20:28

## 2018-07-17 RX ADMIN — IRON SUPPLEMENT SCH MG: 325 TABLET ORAL at 08:21

## 2018-07-17 RX ADMIN — GABAPENTIN SCH MG: 300 CAPSULE ORAL at 08:22

## 2018-07-17 RX ADMIN — TRAMADOL HYDROCHLORIDE PRN MG: 50 TABLET, COATED ORAL at 12:06

## 2018-07-17 RX ADMIN — Medication SCH MG: at 08:22

## 2018-07-17 RX ADMIN — GABAPENTIN SCH MG: 300 CAPSULE ORAL at 20:28

## 2018-07-17 NOTE — FAST
SHIFT START DATE/TIME:



07/16/2018 19:00 (CDT)



SHIFT END DATE/TIME:



07/17/2018 07:00 (CDT)



NAME



ESPERANZA CONLEY



YOB: 1935



DATE OF ADMISSION:



07/05/2018 15:57 (CDT)



PHONE:



[phone]



AGE:



83



N#







GENDER:



Female



ENCOUNTER PHYSICIAN:



Dr. Moirs Gonzalez M.D.



ADMISSION DIAGNOSIS:



- Orthopaedic Disorders 08 -  Femur (Shaft) Fracture (08.2)

Left Femoral Neck Fracture. 



EATING:



Activity did not occur on this shift



EATING - SCORE:



0-UNK  



GROOMING:



Oral care

Wash, rinse, and dry face

Wash, rinse, and dry hands



GROOMING - STEP 1:



Does the patient require assistance when grooming? Yes.



GROOMING - STEP 2:



Does the patient require the assistance of a helper? Yes.



GROOMING - STEP 3:



How much assistance does the patient require from the helper? Only prior equipment preparation/set up
 from the helper



GROOMING - SCORE:



5-SUP  



BATHING:



Activity did not occur on this shift



BATHING - SCORE:



0-UNK  



DRESSING - UPPER BODY:



Patient is not dressing in public clothing



ARTICLES SCORE



Total number of steps: 0



DRESSING - UPPER BODY - SCORE:



0-UNK  



DRESSING - LOWER BODY:



Patient is not dressing in public clothing



ARTICLES SCORE



Total number of steps: 0



DRESSING - LOWER BODY - SCORE:



0-UNK  



TOILETING:



TOILETING - STEP 1:



Does the patient require assistance with toileting? Yes.



TOILETING - STEP 2:



Does the patient require the assistance of a helper? Yes.



TOILETING - STEP 3:



How much assistance does the patient require from the helper? Hands-on assistance from the helper



TOILETING - STEP 4:



Of the 3 tasks: 1) Adjusting clothing prior to use, 2) Cleansing of perineal area,  3) Adjusting clot
mary after use; How many tasks does the patient perform WITHOUT assistance of the helper? One task



TOILETING - SCORE:



2-MAX  



BLADDER MANAGEMENT:



BLADDER MANAGEMENT - STEP 1:



Does the patient control the bladder completely and intentionally without equipment or devices or med
ications, and is always continent? No.



BLADDER MANAGEMENT - STEP 2:



Does the patient require the assistance of a helper? Yes.



BLADDER MANAGEMENT - STEP 3:



How much assistance does the patient require from the helper? Only set-up of equipment - such as plac
ing it within reach of the patient or emptying a device - to maintain either satisfactory voiding pat
tern or managing an external device, such as an absorbent pad, ileal device, or catheter



BLADDER MANAGEMENT - SCORE:



5-SUP  



BOWEL MANAGEMENT:



Activity did not occur on this shift



BOWEL MANAGEMENT - SCORE:



7-IND  



TRANSFERS: BED, CHAIR, WHEELCHAIR:



TRANSFERS: BED, CHAIR, WHEELCHAIR - STEP 1:



Does the patient require assistance with bed, chair, or wheelchair transfers? Yes.



TRANSFERS: BED, CHAIR, WHEELCHAIR - STEP 2:



Does the patient require the assistance of a helper? Yes.



TRANSFERS: BED, CHAIR, WHEELCHAIR - STEP 3:



How much assistance does the patient require from the helper? Lifting of the legs



TRANSFERS: BED, CHAIR, WHEELCHAIR - STEP 4:



How many legs does the patient require the helper to lift? both legs



TRANSFERS: BED, CHAIR, WHEELCHAIR - SCORE:



3-MOD  



TRANSFERS: TOILET:



TRANSFERS: TOILET - STEP 1:



Does the patient require assistance with toilet transfers? Yes.



TRANSFERS: TOILET - STEP 2:



Does the patient require the assistance of a helper? Yes.



TRANSFERS: TOILET - STEP 3:



How much assistance does the patient require from the helper? Patient performs half or more of the tr
ansferring tasks



TRANSFERS: TOILET - STEP 4:



Does the patient need only incidental help such as contact guard or steadying during toilet transfer?
 Yes.



TRANSFERS: TOILET - SCORE:



4-MIN  



TRANSFERS: SHOWER:



Activity did not occur on this shift



TRANSFERS: SHOWER - SCORE:



0-UNK  



TRANSFERS: TUB:



Activity did not occur on this shift



TRANSFERS: TUB - SCORE:



0-UNK  



LOCOMOTION: WALK:



Activity did not occur on this shift



LOCOMOTION: WALK - SCORE:



0-UNK  



LOCOMOTION: WHEELCHAIR:



Activity did not occur on this shift



LOCOMOTION: WHEELCHAIR - SCORE:



0-UNK  



COMPREHENSION:



COMPREHENSION - STEP 1:



Does the patient require help to understand complex and abstract ideas (such as current events, finan
nora, discharge planning, medical issues, relationships, etc)? Yes.



COMPREHENSION - STEP 2:



Does the patient require help to understand questions or statements about basic needs or ideas (such 
as hunger, thirst, sleep, safety, daily schedule, room location, or discomfort) half or more of the t
saleem? No.



COMPREHENSION - STEP 3:



How often does the patient need help to understand directions and conversation about basic needs? 10%
 - 24% of the time



COMPREHENSION - SCORE:



4-MIN  



EXPRESSION



EXPRESSION - STEP 1:



Does the patient require help expressing complex and abstract ideas (such as current events, finances
, discharge planning, medical issues, relationships, etc)? No.



EXPRESSION - STEP 2:



Does the patient need extra time, require an assistive device (such as augmentive communication syste
m or a communication board), OR does s/he have mild difficulty expressing complex and abstract ideas 
(including mild dysarthria or mild word-find problems)? Yes.



EXPRESSION - SCORE:



6-ANUJA  



SOCIAL INTERACTION:



SOCIAL INTERACTION - STEP 1:



Does the patient require a helper to interact with others in social and therapeutic situations? No.



SOCIAL INTERACTION - STEP 2:



Does the patient need extra time in social situations, OR does s/he interact with staff, other patien
ts, and family members ONLY in structured environments, OR does s/he require medication for social in
teraction? Yes, patient requires medication for social interaction



SOCIAL INTERACTION - SCORE:



6-ANUJA  



PROBLEM SOLVING:



PROBLEM SOLVING - STEP 1:



Does the patient need help to solve complex problems such as managing a checking account or confronti
ng interpersonal problems? Yes.



PROBLEM SOLVING - STEP 2:



Does the patient solve basic routine problems half or more of the time? Yes.



PROBLEM SOLVING - STEP 3:



How often does the patient need help to solve basic routine problems? 25%-49% of the time



PROBLEM SOLVING - SCORE:



3-MOD  



MEMORY:



MEMORY - STEP 1:



Does the patient need help to remember frequently encountered people, daily routines, and executing r
equests? No.



MEMORY - STEP 2:



Does the patient have slight difficulty recognizing frequently encountered people, daily routines, or
 executing requests without the need for repetition or using self-initiated or environmental cues to 
remember? Yes.



MEMORY - SCORE:



6-ANUJA  



SIGNATURE PANEL:



The following modified sections: Eating - Score, Grooming - Score, Dressing - Upper Body - Score, Rip
ssing - Lower Body - Score, Toileting - Score, Bladder Management - Score, Bowel Management - Score, 
Transfers: Bed, Chair, Wheelchair - Score, Transfers: Toilet - Score, Transfers: Shower - Score, Young
sfers: Tub - Score, Locomotion: Walk - Score, Locomotion: Wheelchair - Score, Comprehension - Score, 
Expression - Score, Social Interaction - Score, Problem Solving - Score, Memory - Score were [electro
nically] signed by Liliya Caceres CNA on Tue Jul 17 2018 01:24:58 T-0500 (Central Daylight Time)

## 2018-07-17 NOTE — FAST
SHIFT START DATE/TIME:



07/17/2018 07:00 (CDT)



SHIFT END DATE/TIME:



07/17/2018 19:00 (CDT)



NAME



ESPERANZA CONLEY



YOB: 1935



DATE OF ADMISSION:



07/05/2018 15:57 (CDT)



PHONE:



[phone]



AGE:



83



N#







GENDER:



Female



ENCOUNTER PHYSICIAN:



Dr. Moris Gonzalez M.D.



ADMISSION DIAGNOSIS:



- Orthopaedic Disorders 08 -  Femur (Shaft) Fracture (08.2)

Left Femoral Neck Fracture. 



EATING:



EATING - STEP 1:



Does the patient require assistance when eating? Yes.



EATING - STEP 2:



Does the patient require the assistance of a helper? Yes.



EATING - STEP 3:



Does the patient perform half or more of the eating tasks? Yes.



EATING - STEP 4:



Does the patient need only supervision, cuing, coaxing OR help to apply an orthosis OR help to cut fo
od, open containers, pour liquids, or butter bread? Yes.



EATING - SCORE:



5-SUP  



GROOMING:



Activity did not occur on this shift



GROOMING - SCORE:



0-UNK  



BATHING:



Activity did not occur on this shift



BATHING - SCORE:



0-UNK  



DRESSING - UPPER BODY:



Activity did not occur on this shift



ARTICLES SCORE



Total number of steps: 0



DRESSING - UPPER BODY - SCORE:



0-UNK  



DRESSING - LOWER BODY:



Activity did not occur on this shift



ARTICLES SCORE



Total number of steps: 0



DRESSING - LOWER BODY - SCORE:



0-UNK  



TOILETING:



TOILETING - STEP 1:



Does the patient require assistance with toileting? Yes.



TOILETING - STEP 2:



Does the patient require the assistance of a helper? Yes.



TOILETING - STEP 3:



How much assistance does the patient require from the helper? Only supervision



TOILETING - SCORE:



5-SUP  



BLADDER MANAGEMENT:



BLADDER MANAGEMENT - STEP 1:



Does the patient control the bladder completely and intentionally without equipment or devices or med
ications, and is always continent? No.



BLADDER MANAGEMENT - STEP 2:



Does the patient require the assistance of a helper? No, patient requires and independently uses an a
ssistive device, such as a urinal, bedpan, bedside commode, catheter, absorbent pad, or collecting de
vice



BLADDER MANAGEMENT - SCORE:



6-ANUJA  



BLADDER MANAGEMENT - FREQUENCY OF ACCIDENTS:



BLADDER MANAGEMENT(FA) - STEP 1:



How many accidents has the patient had during the current shift? 0



BOWEL MANAGEMENT:



BOWEL MANAGEMENT - STEP 1:



Does the patient control bowels completely and intentionally without equipment devices or medications
 AND is always continent? No.



BOWEL MANAGEMENT - STEP 2:



Does the patient require the assistance of a helper? No, patient requires medication for control such
 as stool softeners, suppositories, laxatives, enemas, or OTC medications



BOWEL MANAGEMENT - SCORE:



6-ANUJA  



BOWEL MANAGEMENT - FREQUENCY OF ACCIDENTS:



BOWEL MANAGEMENT(FA) - STEP 1:



How many accidents has the patient had during the current shift? 0



TRANSFERS: BED, CHAIR, WHEELCHAIR:



TRANSFERS: BED, CHAIR, WHEELCHAIR - STEP 1:



Does the patient require assistance with bed, chair, or wheelchair transfers? Yes.



TRANSFERS: BED, CHAIR, WHEELCHAIR - STEP 2:



Does the patient require the assistance of a helper? Yes.



TRANSFERS: BED, CHAIR, WHEELCHAIR - STEP 3:



How much assistance does the patient require from the helper? Steadying/guiding assistance



TRANSFERS: BED, CHAIR, WHEELCHAIR - SCORE:



4-MIN  



TRANSFERS: TOILET:



TRANSFERS: TOILET - STEP 1:



Does the patient require assistance with toilet transfers? Yes.



TRANSFERS: TOILET - STEP 2:



Does the patient require the assistance of a helper? Yes.



TRANSFERS: TOILET - STEP 3:



How much assistance does the patient require from the helper? Patient performs half or more of the tr
ansferring tasks



TRANSFERS: TOILET - STEP 4:



Does the patient need only incidental help such as contact guard or steadying during toilet transfer?
 Yes.



TRANSFERS: TOILET - SCORE:



4-MIN  



TRANSFERS: SHOWER:



Activity did not occur on this shift



TRANSFERS: SHOWER - SCORE:



0-UNK  



TRANSFERS: TUB:



Activity did not occur on this shift



TRANSFERS: TUB - SCORE:



0-UNK  



LOCOMOTION: WALK:



Activity did not occur on this shift



LOCOMOTION: WALK - SCORE:



0-UNK  



LOCOMOTION: WHEELCHAIR:



LOCOMOTION: WHEELCHAIR - STEP 1:



Does the patient need help to go 150 feet in a wheelchair? Yes.



LOCOMOTION: WHEELCHAIR - STEP 2:



How much assistance does the patient need from the helper? Only incidental help such as around corner
s or over thresholds



LOCOMOTION: WHEELCHAIR - SCORE:



4-MIN  



COMPREHENSION:



COMPREHENSION: TYPE:



Both



COMPREHENSION - STEP 1:



Does the patient require help to understand complex and abstract ideas (such as current events, finan
nora, discharge planning, medical issues, relationships, etc)? Yes.



COMPREHENSION - STEP 2:



Does the patient require help to understand questions or statements about basic needs or ideas (such 
as hunger, thirst, sleep, safety, daily schedule, room location, or discomfort) half or more of the t
saleem? No.



COMPREHENSION - STEP 3:



How often does the patient need help to understand directions and conversation about basic needs? Les
s than 10% of the time



COMPREHENSION - SCORE:



5-SUP  



EXPRESSION



EXPRESSION: TYPE:



Both



EXPRESSION - STEP 1:



Does the patient require help expressing complex and abstract ideas (such as current events, finances
, discharge planning, medical issues, relationships, etc)? Yes.



EXPRESSION - STEP 2:



Does the patient require help to express basic necessities or ideas (such as hunger, thirst, sleep, s
afety, daily schedule, room location, or discomfort) half or more of the time? No.



EXPRESSION - STEP 3:



How often does the patient need help to express directions and conversation about basic needs? Less t
mercedes 10% of the time



EXPRESSION - SCORE:



5-SUP  



SOCIAL INTERACTION:



SOCIAL INTERACTION - STEP 1:



Does the patient require a helper to interact with others in social and therapeutic situations? Yes.



SOCIAL INTERACTION - STEP 2:



Does the patient interact appropriately half or more of the time? Yes.



SOCIAL INTERACTION - STEP 3:



How often does the patient need help to interact appropriately? Less than 10% of the time



SOCIAL INTERACTION - SCORE:



5-SUP  



PROBLEM SOLVING:



PROBLEM SOLVING - STEP 1:



Does the patient need help to solve complex problems such as managing a checking account or confronti
ng interpersonal problems? Yes.



PROBLEM SOLVING - STEP 2:



Does the patient solve basic routine problems half or more of the time? Yes.



PROBLEM SOLVING - STEP 3:



How often does the patient need help to solve basic routine problems? Less than 10% of the time



PROBLEM SOLVING - SCORE:



5-SUP  



MEMORY:



MEMORY - STEP 1:



Does the patient need help to remember frequently encountered people, daily routines, and executing r
equests? Yes.



MEMORY - STEP 2:



How often does the patient need help to remember frequently encountered people, daily routines, and e
xecuting requests? Less than 10% of the time



MEMORY - SCORE:



5-SUP  



SIGNATURE PANEL:



The following modified sections: Eating - Score, Grooming - Score, Bathing - Score, Dressing - Upper 
Body - Score, Dressing - Lower Body - Score, Toileting - Score, Bladder Management - Score, Bowel Man
agement - Score, Transfers: Bed, Chair, Wheelchair - Score, Transfers: Toilet - Score, Transfers: Fabi
wer - Score, Transfers: Tub - Score, Locomotion: Walk - Score, Locomotion: Wheelchair - Score, Compre
hension - Score, Expression - Score, Social Interaction - Score, Problem Solving - Score, Memory - Sc
ore were [electronically] signed by Maricruz Montana RN on Tue Jul 17 2018 13:29:16 GMT-0500 (Central Day
ight Time)

## 2018-07-17 NOTE — FAST
ENCOUNTER DATE AND TIME:



07/17/2018 08:00 (CDT)



NAME



ESPERANZA CONLEY



YOB: 1935



DATE OF ADMISSION:



07/05/2018 15:57 (CDT)



PHONE:



[phone]



AGE:



83



N#







GENDER:



Female



ENCOUNTER PHYSICIAN:



Dr. Moris Gonzalez M.D.



ADMISSION DIAGNOSIS:



- Orthopaedic Disorders 08 -  Femur (Shaft) Fracture (08.2)

Left Femoral Neck Fracture. 



EATING:



Activity did not occur on this shift



EATING - SCORE:



0-UNK  



GROOMING:



Activity did not occur on this shift



GROOMING - SCORE:



0-UNK  



BATHING:



Activity did not occur on this shift



BATHING - SCORE:



0-UNK  



DRESSING - UPPER BODY:



Activity did not occur on this shift

Patient is not dressing in public clothing



ARTICLES SCORE



Total number of steps: 0



DRESSING - UPPER BODY - SCORE:



0-UNK  



DRESSING - LOWER BODY:



Activity did not occur on this shift

Patient is not dressing in public clothing



ARTICLES SCORE



Total number of steps: 0



DRESSING - LOWER BODY - SCORE:



0-UNK  



TOILETING:



Activity did not occur on this shift



TOILETING - SCORE:



0-UNK  



BLADDER MANAGEMENT:



Activity did not occur on this shift



BLADDER MANAGEMENT - SCORE:



7-IND  



BOWEL MANAGEMENT:



Activity did not occur on this shift



BOWEL MANAGEMENT - SCORE:



7-IND  



TRANSFERS: BED, CHAIR, WHEELCHAIR:



Activity did not occur on this shift



TRANSFERS: BED, CHAIR, WHEELCHAIR - SCORE:



0-UNK  



TRANSFERS: TOILET:



Activity did not occur on this shift



TRANSFERS: TOILET - SCORE:



0-UNK  



TRANSFERS: SHOWER:



Activity did not occur on this shift



TRANSFERS: SHOWER - SCORE:



0-UNK  



TRANSFERS: TUB:



Activity did not occur on this shift



TRANSFERS: TUB - SCORE:



0-UNK  



LOCOMOTION: WALK:



Activity did not occur on this shift



LOCOMOTION: WALK - SCORE:



0-UNK  



LOCOMOTION: WHEELCHAIR:



Activity did not occur on this shift



LOCOMOTION: WHEELCHAIR - SCORE:



0-UNK  



LOCOMOTION: STAIRS:



Activity did not occur on this shift



LOCOMOTION: STAIRS - SCORE:



0-UNK  



COMPREHENSION:



COMPREHENSION - STEP 1:



Does the patient require help to understand complex and abstract ideas (such as current events, finan
nora, discharge planning, medical issues, relationships, etc)? No.



COMPREHENSION - STEP 2:



Does the patient need extra time, require an assistive device (such as glasses, hearing aids, or an a
ugmentative communication system), OR does s/he have mild difficulty expressing complex and abstract 
ideas (including mild dysarthria or mild word-finding problems)? Yes.



COMPREHENSION - SCORE:



6-ANUJA  



EXPRESSION



EXPRESSION - STEP 1:



Does the patient require help expressing complex and abstract ideas (such as current events, finances
, discharge planning, medical issues, relationships, etc)? No.



EXPRESSION - STEP 2:



Does the patient need extra time, require an assistive device (such as augmentive communication syste
m or a communication board), OR does s/he have mild difficulty expressing complex and abstract ideas 
(including mild dysarthria or mild word-find problems)? Yes.



EXPRESSION - SCORE:



6-ANUJA  



SOCIAL INTERACTION:



SOCIAL INTERACTION - STEP 1:



Does the patient require a helper to interact with others in social and therapeutic situations? No.



SOCIAL INTERACTION - STEP 2:



Does the patient need extra time in social situations, OR does s/he interact with staff, other patien
ts, and family members ONLY in structured environments, OR does s/he require medication for social in
teraction? Yes, patient needs extra time



SOCIAL INTERACTION - SCORE:



6-ANUJA  



PROBLEM SOLVING:



PROBLEM SOLVING - STEP 1:



Does the patient need help to solve complex problems such as managing a checking account or confronti
ng interpersonal problems? Yes.



PROBLEM SOLVING - STEP 2:



Does the patient solve basic routine problems half or more of the time? Yes.



PROBLEM SOLVING - STEP 3:



How often does the patient need help to solve basic routine problems? 10%-24% of the time



PROBLEM SOLVING - SCORE:



4-MIN  



MEMORY:



MEMORY - STEP 1:



Does the patient need help to remember frequently encountered people, daily routines, and executing r
equests? Yes.



MEMORY - STEP 2:



How often does the patient need help to remember frequently encountered people, daily routines, and e
xecuting requests? 25% - 49% of the time



MEMORY - SCORE:



3-MOD  



SIGNATURE PANEL:



The following modified sections: Comprehension - Score, Expression - Score, Social Interaction - Scor
e, Problem Solving - Score, Memory - Score were [electronically] signed by ST prabhu Gardner 17 2018 18:22:23 GMT-0500 (Central Daylight Time)

## 2018-07-17 NOTE — FAST
ENCOUNTER DATE AND TIME:



07/17/2018 08:00 (CDT)



NAME



ESPERANZA CONLEY



YOB: 1935



DATE OF ADMISSION:



07/05/2018 15:57 (CDT)



PHONE:



[phone]



AGE:



83



N#







GENDER:



Female



ENCOUNTER PHYSICIAN:



Dr. Moris Gonzalez M.D.



ADMISSION DIAGNOSIS:



- Orthopaedic Disorders 08 -  Femur (Shaft) Fracture (08.2)

Left Femoral Neck Fracture. 



EATING:



Activity did not occur on this shift



EATING - SCORE:



0-UNK  



GROOMING:



Activity did not occur on this shift



GROOMING - SCORE:



0-UNK  



BATHING:



Activity did not occur on this shift



BATHING - SCORE:



0-UNK  



DRESSING - UPPER BODY:



Activity did not occur on this shift

Patient is not dressing in public clothing



ARTICLES SCORE



Total number of steps: 0



DRESSING - UPPER BODY - SCORE:



0-UNK  



DRESSING - LOWER BODY:



Activity did not occur on this shift

Patient is not dressing in public clothing



ARTICLES SCORE



Total number of steps: 0



DRESSING - LOWER BODY - SCORE:



0-UNK  



TOILETING:



Activity did not occur on this shift



TOILETING - SCORE:



0-UNK  



BLADDER MANAGEMENT:



Activity did not occur on this shift



BLADDER MANAGEMENT - SCORE:



7-IND  



BOWEL MANAGEMENT:



Activity did not occur on this shift



BOWEL MANAGEMENT - SCORE:



7-IND  



TRANSFERS: BED, CHAIR, WHEELCHAIR:



TRANSFERS: BED, CHAIR, WHEELCHAIR - STEP 1:



Does the patient require assistance with bed, chair, or wheelchair transfers? Yes.



TRANSFERS: BED, CHAIR, WHEELCHAIR - STEP 2:



Does the patient require the assistance of a helper? Yes.



TRANSFERS: BED, CHAIR, WHEELCHAIR - STEP 3:



How much assistance does the patient require from the helper? Steadying/guiding assistance



TRANSFERS: BED, CHAIR, WHEELCHAIR - SCORE:



4-MIN  



TRANSFERS: TOILET:



Activity did not occur on this shift



TRANSFERS: TOILET - SCORE:



0-UNK  



TRANSFERS: SHOWER:



Activity did not occur on this shift



TRANSFERS: SHOWER - SCORE:



0-UNK  



TRANSFERS: TUB:



Activity did not occur on this shift



TRANSFERS: TUB - SCORE:



0-UNK  



LOCOMOTION: WALK:



LOCOMOTION: WALK - STEP 1:



Does the patient need help to walk 150 feet? Yes.



LOCOMOTION: WALK - STEP 2:



How much assistance does the patient require to walk a minimum of 150 feet? Only incidental help such
 as contact guarding or steadying



LOCOMOTION: WALK - SCORE:



4-MIN  



LOCOMOTION: WHEELCHAIR:



LOCOMOTION: WHEELCHAIR - STEP 1:



Does the patient need help to go 150 feet in a wheelchair? Yes.



LOCOMOTION: WHEELCHAIR - STEP 2:



How much assistance does the patient need from the helper? Only supervision, cuing, or coaxing



LOCOMOTION: WHEELCHAIR - SCORE:



5-SUP  



LOCOMOTION: STAIRS:



Activity did not occur on this shift



LOCOMOTION: STAIRS - SCORE:



0-UNK  



COMPREHENSION:



COMPREHENSION - SCORE:



0-UNK  



EXPRESSION



EXPRESSION - SCORE:



0-UNK  



SOCIAL INTERACTION:



SOCIAL INTERACTION - SCORE:



0-UNK  



PROBLEM SOLVING:



PROBLEM SOLVING - SCORE:



0-UNK  



MEMORY:



MEMORY - SCORE:



0-UNK  



SIGNATURE PANEL:



The following modified sections: Transfers: Bed, Chair, Wheelchair - Score, Transfers: Toilet - Score
, Locomotion: Walk - Score, Locomotion: Wheelchair - Score, Locomotion: Stairs - Score were [nhung louis] signed by Ynes Goyal PTA on Tue Jul 17 2018 12:00:56 GMT-0500 (Central Daylight Time)

## 2018-07-17 NOTE — R.PN
ENCOUNTER DATE AND TIME:



07/17/2018 17:41 (CDT)



NAME



ESPERANZA CONLEY



YOB: 1935



DATE OF ADMISSION:



07/05/2018 15:57 (CDT)



Left Femoral Neck FractureCHIEF COMPLAINT:



Left hip fracture.



SUBJECTIVE:



Pt denied any Shortness of Breath.

Pt denied any depression.

Ambulated 144' using a hemiwalker with contact guard assistance. Self-propelled wheelchair 250' with 
standby assistance.



VITAL SIGNS



Temperature: 97.4 F

SBP/DBP: 135/62

Pulse: 72

Resp: 16

Activities of daily living was done with standby assistance to modified independence.



MEDICATION ALLERGIES:



CODEINE

CEPHALEXIN



ENVIRONMENTAL ALLERGIES:





None Known

- Substance Allergies

None Known

- Other Allergies

None Known



NURSING:



- Shower

allowing shower

- Skin

care per protocol



PRECAUTIONS:



- Weight Bearing Precaution

WBAT left LE

NWB Left UE

- Fall Precaution

Bed and chair alarm



ACTIVITIES



OOB only with supervision



THERAPIES:



- Occupational Therapy

Evaluate and Treat. 



- Physical Therapy

Evaluate and Treat. 



PHYSICAL EXAM



- Gen

Alert and awake

Lying in bed

No apparent distress

Oriented to: person, time, and place

- Skin

No skin breakdown.



Normacephalic

- Eyes

No abnormalities

- ENMT

No abnormalities

- Neck

No abnormalities

- CVS

RRR

- Chest

Clear

- Abd

Soft

- GI

Non distended



Deferred

- 

No abnormalities

- Ext

Mild postoperative edema in the left lower extremity.

- MSK

4+/5 weakness in left lower extremity

- Neuro

4/5 strength left lower extremities.

- Psych

No abnormalities



ASSESSMENT:



Pt. is a 82 yo Right-handed white female.Her impairment category is Orthopaedic Disorders 08 -  Femur
 (Shaft) Fracture (08.2).Pre-morbidly, Pt. was independent/mod-I in Social Cognition, Self-Care, Loco
motion, Sphincter Control, Transfers Control, and Communication; and she had good Sphincter Control.C
urrently, she has deficits of Balance, Self-Care, Locomotion, Endurance, Safety Awareness, and Transf
ers Control.Pt. is now referred to [facility] for acute in-patient rehabilitat
ion in order to maximize patient's functional independence in activities of daily living, strength, R
OM, and mobility.- Rehab Goal

Patient has realistic goal of being discharged at assistance level 6-Brian to reside at Home with  Fam
russell/Relatives.

MDM/PLAN:



- Diet Type

Continue Regular

- Physical Therapy

 Decreased range of motion - to improve, our physical therapists will perform initial evaluation of p
t's status upon admission and devise an individualized program for increasing patient's Range of Miguel
on.

 Gait dysfunction - to improve, our physical therapists will perform initial evaluation of pt's statu
s upon admission and devise an individualized program for Gait Training, and Wheel Chair mobility

 Inability to transfer - to improve, our physical therapists will perform initial evaluation of pt's 
status upon admission and devise an individualized program for Bed mobility

 Need for home safety evaluation - to improve, our physical therapists will perform initial evaluatio
n of pt's status upon admission and devise an individualized program for Home Evaluation

 Need in caregiver upon discharge - to improve, our physical therapists will perform initial evaluati
on of pt's status upon admission and devise an individualized program for Caregiver Training

 Edema - to improve, our physical therapists will perform initial evaluation of pt's status upon admi
ssion and devise an individualized program for Elevation Training, and Lymphedema Therapy

 New precaution - to improve, our physical therapists will perform initial evaluation of pt's status 
upon admission and devise an individualized program for Patient precaution education

 Poor balance - to improve, our physical therapists will perform initial evaluation of pt's status up
on admission and devise an individualized program for Balance Training

 Poor endurance - to improve, our physical therapists will perform initial evaluation of pt's status 
upon admission and devise an individualized program for Endurance Training

 Weakness - to improve, our physical therapists will perform initial evaluation of pt's status upon a
dmission and devise an individualized program for Aquatic Therapy, Neuromuscular Reeducation, and Str
engthening

 Achieving independence - to improve, our physical therapists will perform initial evaluation of pt's
 status upon admission and devise an individualized program for Community Reintegration Activities

- Diet - Liquid Texture

Continue Regular

- Tube Feed

Continue N/A

- Weight Bearing Precaution

 WBAT left LE

 NWB Left UE

- Fall Precaution

 Bed and chair alarm

- Skin

 care per protocol

- Diet - Solid Texture

Continue Regular

- Shower

 allowing shower

- Occupational Therapy

 ADL deficits - to improve, our occupation therapists will perform initial evaluation of pt's status 
upon admission and devise an individualized program for Bathing, Bed mobility, Community Reintegratio
n, Cooking, Dressing, Eating, Fine Motor Skills, Grooming, Homemaking, Kitchen Mobility, Laundry, Pat
ient Education, Safety Awareness, Splinting - Positioning, Transfers(Toilet, Tub, Shower), and Wheel 
Chair Management

 Need for care giver - to improve, our occupation therapists will perform initial evaluation of pt's 
status upon admission and devise an individualized program for Caregiver Training

 Weakness - to improve, our occupation therapists will perform initial evaluation of pt's status upon
 admission and devise an individualized program for Aquatic Therapy, Balance, Endurance, UE ROM, and 
UE strengthening



FUNCTIONAL STATUS: UPDATED AT WEEKLY TEAM CONFERENCE



- Bladder

Same accident frequency: 7-Ind - No accidents in the past 7 days

- Bowel

Same accident frequency: 7-Ind - No accidents in the past 7 days

- Walking

Same score based on distance walked: 0(N/A)

- Wheelchair

Same score based on distance traveled: 0(N/A)



FUNCTIONAL STATUS:



- Self-Care

A. Eating    sup   

B. Grooming    sup   

C. Bathing    sup   

D. Dressing - Upper     Ben   

E. Dressing - Lower     Ben   

F. Toileting    Ben   

- Sphincter Control

G: Bladder control     Ind   

H: Bowel control     Ind   

- Transfers Control

I. Bed/Chair/Wheelchair     maxA   

J. Toilet    maxA   

K. Tub/Shower    ADNO   

- Locomotion

L. Walk/Wheelchair (C)     maxA   

L. Walk/Wheelchair (W)     maxA   

M. Stairs    ADNO   

- Communication

N. Comprehension (B)     Ind   

O. Expression (B)     Ind   

- Social Cognition

P. Social Interaction    Ind   

Q. Problem Solving    Ind   

R. Memory    Ind   

- Endurance

Fair

- Balance

Fair

- Safety Awareness

Fair



CURRENT FUNC. DEFICITS:



Balance, Self-Care, Locomotion, Endurance, Safety Awareness, and Transfers Control



SIGNATURE PANEL:



Electronically signed by Dr. Moris Gonzalez M.D. on 07/17/2018 at 17:43 (CDT)

## 2018-07-17 NOTE — FAST
ENCOUNTER DATE AND TIME:



07/17/2018 08:00 (CDT)



NAME



ESPERANZA CONLEY



YOB: 1935



DATE OF ADMISSION:



07/05/2018 15:57 (CDT)



PHONE:



[phone]



AGE:



83



N#







GENDER:



Female



ENCOUNTER PHYSICIAN:



Dr. Moris Gonzalez M.D.



ADMISSION DIAGNOSIS:



- Orthopaedic Disorders 08 -  Femur (Shaft) Fracture (08.2)

Left Femoral Neck Fracture. 



EATING:



Activity did not occur on this shift



EATING - SCORE:



0-UNK  



GROOMING:



Activity did not occur on this shift



GROOMING - SCORE:



0-UNK  



BATHING:



Activity did not occur on this shift



BATHING - SCORE:



0-UNK  



DRESSING - UPPER BODY:



Activity did not occur on this shift



ARTICLES SCORE



Total number of steps: 0



DRESSING - UPPER BODY - SCORE:



0-UNK  



DRESSING - LOWER BODY:



Sock - Left foot (one step)  

Sock - Right foot (one step)  



ARTICLES SCORE



Total number of steps: 2



DRESSING - LOWER BODY - STEP 1:



Does the patient require help when dressing below the waist? Yes.



DRESSING - LOWER BODY - STEP 2:



Does the patient require the assistance of a helper? Yes.



DRESSING - LOWER BODY - STEP 3:



Does the helper touch the patient while dressing? No.



DRESSING - LOWER BODY - SCORE:



5-SUP  



TOILETING:



Activity did not occur on this shift



TOILETING - SCORE:



0-UNK  



BLADDER MANAGEMENT:



Activity did not occur on this shift



BLADDER MANAGEMENT - SCORE:



7-IND  



BOWEL MANAGEMENT:



Activity did not occur on this shift



BOWEL MANAGEMENT - SCORE:



7-IND  



TRANSFERS: BED, CHAIR, WHEELCHAIR:



Activity did not occur on this shift



TRANSFERS: BED, CHAIR, WHEELCHAIR - SCORE:



0-UNK  



TRANSFERS: TOILET:



Activity did not occur on this shift



TRANSFERS: TOILET - SCORE:



0-UNK  



TRANSFERS: SHOWER:



Activity did not occur on this shift



TRANSFERS: SHOWER - SCORE:



0-UNK  



TRANSFERS: TUB:



Activity did not occur on this shift



TRANSFERS: TUB - SCORE:



0-UNK  



LOCOMOTION: WALK:



Activity did not occur on this shift



LOCOMOTION: WALK - SCORE:



0-UNK  



LOCOMOTION: WHEELCHAIR:



Activity did not occur on this shift



LOCOMOTION: WHEELCHAIR - SCORE:



0-UNK  



LOCOMOTION: STAIRS:



Activity did not occur on this shift



LOCOMOTION: STAIRS - SCORE:



0-UNK  



COMPREHENSION:



COMPREHENSION: TYPE:



Visual



COMPREHENSION - SCORE:



0-UNK  



EXPRESSION



EXPRESSION - SCORE:



0-UNK  



SOCIAL INTERACTION:



SOCIAL INTERACTION - SCORE:



0-UNK  



PROBLEM SOLVING:



PROBLEM SOLVING - SCORE:



0-UNK  



MEMORY:



MEMORY - SCORE:



0-UNK  



SIGNATURE PANEL:



The following modified sections: Eating - Score, Grooming - Score, Bathing - Score, Dressing - Upper 
Body - Score, Dressing - Lower Body - Score, Toileting - Score, Transfers: Bed, Chair, Wheelchair - S
core, Transfers: Toilet - Score, Transfers: Shower - Score, Transfers: Tub - Score, Comprehension - S
core, Expression - Score, Social Interaction - Score, Problem Solving - Score, Memory - Score were [e
lectronically] signed by AKASH Harvey on Tue Jul 17 2018 11:05:16 T-0500 ([facility] Time)

## 2018-07-18 LAB
ALBUMIN SERPL BCP-MCNC: 2.1 G/DL (ref 3.4–5)
BUN BLD-MCNC: 17 MG/DL (ref 7–18)
GLUCOSE SERPLBLD-MCNC: 90 MG/DL (ref 74–106)
HCT VFR BLD CALC: 29.6 % (ref 36–45)
LYMPHOCYTES # SPEC AUTO: 2 K/UL (ref 0.7–4.9)
MCH RBC QN AUTO: 29.8 PG (ref 27–35)
MCV RBC: 88.5 FL (ref 80–100)
PMV BLD: 7.3 FL (ref 7.6–11.3)
POTASSIUM SERPL-SCNC: 4.6 MMOL/L (ref 3.5–5.1)
PREALB SERPL-MCNC: 16.7 MG/DL (ref 20–40)
RBC # BLD: 3.34 M/UL (ref 3.86–4.86)

## 2018-07-18 RX ADMIN — GABAPENTIN SCH MG: 300 CAPSULE ORAL at 08:56

## 2018-07-18 RX ADMIN — Medication SCH PATCH: at 08:54

## 2018-07-18 RX ADMIN — AMIODARONE HYDROCHLORIDE SCH MG: 200 TABLET ORAL at 08:56

## 2018-07-18 RX ADMIN — MELATONIN PRN MG: 3 TAB ORAL at 20:25

## 2018-07-18 RX ADMIN — SENNOSIDES AND DOCUSATE SODIUM SCH TAB: 8.6; 5 TABLET ORAL at 20:25

## 2018-07-18 RX ADMIN — Medication SCH MG: at 08:55

## 2018-07-18 RX ADMIN — BISOPROLOL FUMARATE SCH MG: 5 TABLET, COATED ORAL at 08:56

## 2018-07-18 RX ADMIN — SERTRALINE HYDROCHLORIDE SCH MG: 100 TABLET ORAL at 08:56

## 2018-07-18 RX ADMIN — IRON SUPPLEMENT SCH MG: 325 TABLET ORAL at 08:55

## 2018-07-18 RX ADMIN — Medication SCH ML: at 19:00

## 2018-07-18 RX ADMIN — TRAMADOL HYDROCHLORIDE PRN MG: 50 TABLET, COATED ORAL at 12:53

## 2018-07-18 RX ADMIN — PANTOPRAZOLE SODIUM SCH MG: 40 TABLET, DELAYED RELEASE ORAL at 08:55

## 2018-07-18 RX ADMIN — Medication SCH MG: at 19:00

## 2018-07-18 RX ADMIN — DOXEPIN HYDROCHLORIDE SCH MG: 10 CAPSULE ORAL at 08:56

## 2018-07-18 RX ADMIN — Medication SCH ML: at 08:00

## 2018-07-18 RX ADMIN — LEVOTHYROXINE SODIUM SCH MG: 0.1 TABLET ORAL at 05:10

## 2018-07-18 RX ADMIN — ENOXAPARIN SODIUM SCH MG: 30 INJECTION SUBCUTANEOUS at 17:09

## 2018-07-18 RX ADMIN — TRAMADOL HYDROCHLORIDE PRN MG: 50 TABLET, COATED ORAL at 18:58

## 2018-07-18 RX ADMIN — MONTELUKAST SODIUM SCH MG: 10 TABLET, FILM COATED ORAL at 08:55

## 2018-07-18 RX ADMIN — GABAPENTIN SCH MG: 300 CAPSULE ORAL at 19:00

## 2018-07-18 RX ADMIN — ISOSORBIDE MONONITRATE SCH MG: 30 TABLET, EXTENDED RELEASE ORAL at 08:56

## 2018-07-18 RX ADMIN — NIFEDIPINE SCH MG: 30 TABLET, FILM COATED, EXTENDED RELEASE ORAL at 20:25

## 2018-07-18 RX ADMIN — TRAMADOL HYDROCHLORIDE PRN MG: 50 TABLET, COATED ORAL at 08:56

## 2018-07-18 RX ADMIN — Medication SCH TAB: at 08:56

## 2018-07-18 NOTE — FAST
SHIFT START DATE/TIME:



07/18/2018 07:00 (CDT)



SHIFT END DATE/TIME:



07/18/2018 19:00 (CDT)



NAME



ESPERANZA CONLEY



YOB: 1935



DATE OF ADMISSION:



07/05/2018 15:57 (CDT)



PHONE:



[phone]



AGE:



83



N#







GENDER:



Female



ENCOUNTER PHYSICIAN:



Dr. Moris Gonzalez M.D.



ADMISSION DIAGNOSIS:



- Orthopaedic Disorders 08 -  Femur (Shaft) Fracture (08.2)

Left Femoral Neck Fracture. 



EATING:



EATING - STEP 1:



Does the patient require assistance when eating? No.



EATING - SCORE:



7-IND  



GROOMING:



Wash, rinse, and dry hands



GROOMING - STEP 1:



Does the patient require assistance when grooming? No.



GROOMING - SCORE:



7-IND  



BATHING:



Activity did not occur on this shift



BATHING - SCORE:



0-UNK  



DRESSING - UPPER BODY:



Activity did not occur on this shift



ARTICLES SCORE



Total number of steps: 0



DRESSING - UPPER BODY - SCORE:



0-UNK  



DRESSING - LOWER BODY:



Activity did not occur on this shift



ARTICLES SCORE



Total number of steps: 0



DRESSING - LOWER BODY - SCORE:



0-UNK  



TOILETING:



TOILETING - STEP 1:



Does the patient require assistance with toileting? Yes.



TOILETING - STEP 2:



Does the patient require the assistance of a helper? No.



TOILETING - SCORE:



6-ANUJA  



BLADDER MANAGEMENT:



BLADDER MANAGEMENT - STEP 1:



Does the patient control the bladder completely and intentionally without equipment or devices or med
ications, and is always continent? Yes.



BLADDER MANAGEMENT - SCORE:



7-IND  



BLADDER MANAGEMENT - FREQUENCY OF ACCIDENTS:



BLADDER MANAGEMENT(FA) - STEP 1:



How many accidents has the patient had during the current shift? 0



BOWEL MANAGEMENT:



BOWEL MANAGEMENT - STEP 1:



Does the patient control bowels completely and intentionally without equipment devices or medications
 AND is always continent? Yes.



BOWEL MANAGEMENT - SCORE:



7-IND  



BOWEL MANAGEMENT - FREQUENCY OF ACCIDENTS:



BOWEL MANAGEMENT(FA) - STEP 1:



How many accidents has the patient had during the current shift? 0



TRANSFERS: BED, CHAIR, WHEELCHAIR:



TRANSFERS: BED, CHAIR, WHEELCHAIR - STEP 1:



Does the patient require assistance with bed, chair, or wheelchair transfers? Yes.



TRANSFERS: BED, CHAIR, WHEELCHAIR - STEP 2:



Does the patient require the assistance of a helper? Yes.



TRANSFERS: BED, CHAIR, WHEELCHAIR - STEP 3:



How much assistance does the patient require from the helper? Steadying/guiding assistance



TRANSFERS: BED, CHAIR, WHEELCHAIR - SCORE:



4-MIN  



TRANSFERS: TOILET:



TRANSFERS: TOILET - STEP 1:



Does the patient require assistance with toilet transfers? Yes.



TRANSFERS: TOILET - STEP 2:



Does the patient require the assistance of a helper? Yes.



TRANSFERS: TOILET - STEP 3:



How much assistance does the patient require from the helper? Patient performs half or more of the tr
ansferring tasks



TRANSFERS: TOILET - STEP 4:



Does the patient need only incidental help such as contact guard or steadying during toilet transfer?
 Yes.



TRANSFERS: TOILET - SCORE:



4-MIN  



TRANSFERS: SHOWER:



Activity did not occur on this shift



TRANSFERS: SHOWER - SCORE:



0-UNK  



TRANSFERS: TUB:



Activity did not occur on this shift



TRANSFERS: TUB - SCORE:



0-UNK  



LOCOMOTION: WALK:



Activity did not occur on this shift



LOCOMOTION: WALK - SCORE:



0-UNK  



LOCOMOTION: WHEELCHAIR:



Activity did not occur on this shift



LOCOMOTION: WHEELCHAIR - SCORE:



0-UNK  



COMPREHENSION:



COMPREHENSION - SCORE:



0-UNK  



EXPRESSION



EXPRESSION - SCORE:



0-UNK  



SOCIAL INTERACTION:



SOCIAL INTERACTION - SCORE:



0-UNK  



PROBLEM SOLVING:



PROBLEM SOLVING - SCORE:



0-UNK  



MEMORY:



MEMORY - SCORE:



0-UNK  



SIGNATURE PANEL:



The following modified sections: Eating - Score, Grooming - Score, Bathing - Score, Dressing - Upper 
Body - Score, Dressing - Lower Body - Score, Toileting - Score, Bladder Management - Score, Bowel Man
agement - Score, Transfers: Bed, Chair, Wheelchair - Score, Transfers: Toilet - Score, Transfers: Fabi
wer - Score, Transfers: Tub - Score, Locomotion: Walk - Score, Locomotion: Wheelchair - Score, Compre
hension - Score, Expression - Score, Social Interaction - Score, Problem Solving - Score, Memory - Sc
ore were [electronically] signed by Leidy Meza CNA on Wed Jul 18 2018 18:07:38 GMT-0500 ([facility] Time)

## 2018-07-18 NOTE — FAST
ENCOUNTER DATE AND TIME:



07/18/2018 08:00 (CDT)



NAME



ESPERANZA CONLEY



YOB: 1935



DATE OF ADMISSION:



07/05/2018 15:57 (CDT)



PHONE:



[phone]



AGE:



83



N#







GENDER:



Female



ENCOUNTER PHYSICIAN:



Dr. Moris Gonzalez M.D.



ADMISSION DIAGNOSIS:



- Orthopaedic Disorders 08 -  Femur (Shaft) Fracture (08.2)

Left Femoral Neck Fracture. 



EATING:



Activity did not occur on this shift



EATING - SCORE:



0-UNK  



GROOMING:



Wash, rinse, and dry face

Wash, rinse, and dry hands



GROOMING - STEP 1:



Does the patient require assistance when grooming? No.



GROOMING - SCORE:



7-IND  



BATHING:



Abdomen

Buttocks

Chest

Left arm

Left lower leg and foot

Left upper leg

Perineal area

Right arm

Right lower leg and foot

Right upper leg



BATHING - STEP 1:



Does the patient require assistance when bathing? Yes.



BATHING - STEP 2:



Does the patient require the assistance of a helper? Yes.



BATHING - STEP 3:



How much assistance does the patient require from the helper? Only prior preparation such as putting 
bathing equipment within reach, turning on water, checking water temperature



BATHING - SCORE:



5-SUP  



DRESSING - UPPER BODY:



Button down shirt or blouse - TUCKED IN (five steps)  



ARTICLES SCORE



Total number of steps: 5



DRESSING - UPPER BODY - STEP 1:



Does the patient require help when dressing above the waist? Yes.



DRESSING - UPPER BODY - STEP 2:



Does the patient require the assistance of a helper? Yes.



DRESSING - UPPER BODY - STEP 3:



Does the helper touch the patient while dressing? No.



DRESSING - UPPER BODY - SCORE:



5-SUP  



DRESSING - LOWER BODY:



Elastic waist pants (three steps)  

Sock - Left foot (one step)  

Sock - Right foot (one step)  

Underwear (three steps)  



ARTICLES SCORE



Total number of steps: 8



DRESSING - LOWER BODY - STEP 1:



Does the patient require help when dressing below the waist? Yes.



DRESSING - LOWER BODY - STEP 2:



Does the patient require the assistance of a helper? Yes.



DRESSING - LOWER BODY - STEP 3:



Does the helper touch the patient while dressing? No.



DRESSING - LOWER BODY - SCORE:



5-SUP  



TOILETING:



TOILETING - STEP 1:



Does the patient require assistance with toileting? Yes.



TOILETING - STEP 2:



Does the patient require the assistance of a helper? Yes.



TOILETING - STEP 3:



How much assistance does the patient require from the helper? Only supervision



TOILETING - SCORE:



5-SUP  



BLADDER MANAGEMENT:



Activity did not occur on this shift



BLADDER MANAGEMENT - SCORE:



7-IND  



BOWEL MANAGEMENT:



Activity did not occur on this shift



BOWEL MANAGEMENT - SCORE:



7-IND  



TRANSFERS: BED, CHAIR, WHEELCHAIR:



Activity did not occur on this shift



TRANSFERS: BED, CHAIR, WHEELCHAIR - SCORE:



0-UNK  



TRANSFERS: TOILET:



TRANSFERS: TOILET - STEP 1:



Does the patient require assistance with toilet transfers? Yes.



TRANSFERS: TOILET - STEP 2:



Does the patient require the assistance of a helper? Yes.



TRANSFERS: TOILET - STEP 3:



How much assistance does the patient require from the helper? Patient performs half or more of the tr
ansferring tasks



TRANSFERS: TOILET - STEP 4:



Does the patient need only incidental help such as contact guard or steadying during toilet transfer?
 Yes.



TRANSFERS: TOILET - SCORE:



4-MIN  



TRANSFERS: SHOWER:



TRANSFERS: SHOWER - STEP 1:



Does the patient require assistance with shower transfers? Yes.



TRANSFERS: SHOWER - STEP 2:



Does the patient require the assistance of a helper? Yes.



TRANSFERS: SHOWER - STEP 3:



How much assistance does the patient require from the helper? Only incidental help such as contact gu
arding or steadying during shower transfers, or help to lift one leg into the shower



TRANSFERS: SHOWER - SCORE:



4-MIN  



TRANSFERS: TUB:



Activity did not occur on this shift



TRANSFERS: TUB - SCORE:



0-UNK  



LOCOMOTION: WALK:



Activity did not occur on this shift



LOCOMOTION: WALK - SCORE:



0-UNK  



LOCOMOTION: WHEELCHAIR:



Activity did not occur on this shift



LOCOMOTION: WHEELCHAIR - SCORE:



0-UNK  



LOCOMOTION: STAIRS:



Activity did not occur on this shift



LOCOMOTION: STAIRS - SCORE:



0-UNK  



COMPREHENSION:



COMPREHENSION: TYPE:



Both



COMPREHENSION - STEP 1:



Does the patient require help to understand complex and abstract ideas (such as current events, finan
nora, discharge planning, medical issues, relationships, etc)? Yes.



COMPREHENSION - STEP 2:



Does the patient require help to understand questions or statements about basic needs or ideas (such 
as hunger, thirst, sleep, safety, daily schedule, room location, or discomfort) half or more of the t
saleem? No.



COMPREHENSION - STEP 3:



How often does the patient need help to understand directions and conversation about basic needs? Les
s than 10% of the time



COMPREHENSION - SCORE:



5-SUP  



EXPRESSION



EXPRESSION: TYPE:



Both



EXPRESSION - STEP 1:



Does the patient require help expressing complex and abstract ideas (such as current events, finances
, discharge planning, medical issues, relationships, etc)? No.



EXPRESSION - STEP 2:



Does the patient need extra time, require an assistive device (such as augmentive communication syste
m or a communication board), OR does s/he have mild difficulty expressing complex and abstract ideas 
(including mild dysarthria or mild word-find problems)? Yes.



EXPRESSION - SCORE:



6-ANUJA  



SOCIAL INTERACTION:



SOCIAL INTERACTION - STEP 1:



Does the patient require a helper to interact with others in social and therapeutic situations? Yes.



SOCIAL INTERACTION - STEP 2:



Does the patient interact appropriately half or more of the time? Yes.



SOCIAL INTERACTION - STEP 3:



How often does the patient need help to interact appropriately? Less than 10% of the time



SOCIAL INTERACTION - SCORE:



5-SUP  



PROBLEM SOLVING:



PROBLEM SOLVING - STEP 1:



Does the patient need help to solve complex problems such as managing a checking account or confronti
ng interpersonal problems? Yes.



PROBLEM SOLVING - STEP 2:



Does the patient solve basic routine problems half or more of the time? Yes.



PROBLEM SOLVING - STEP 3:



How often does the patient need help to solve basic routine problems? 25%-49% of the time



PROBLEM SOLVING - SCORE:



3-MOD  



MEMORY:



MEMORY - STEP 1:



Does the patient need help to remember frequently encountered people, daily routines, and executing r
equests? Yes.



MEMORY - STEP 2:



How often does the patient need help to remember frequently encountered people, daily routines, and e
xecuting requests? 25% - 49% of the time



MEMORY - SCORE:



3-MOD  



SIGNATURE PANEL:



The following modified sections: Eating - Score, Grooming - Score, Bathing - Score, Dressing - Upper 
Body - Score, Dressing - Lower Body - Score, Toileting - Score, Transfers: Bed, Chair, Wheelchair - S
core, Transfers: Toilet - Score, Transfers: Shower - Score, Transfers: Tub - Score, Comprehension - S
core, Expression - Score, Social Interaction - Score, Problem Solving - Score, Memory - Score were [e
lectronically] signed by Ynes Cooper OT on Wed Jul 18 2018 11:37:43 [facility]-0500 ([place] Time)

## 2018-07-18 NOTE — P.RH.PN
Estimated Length of Stay: 13


Expected Discharge Date: 07/18/18


Discharge Disposition Plan: Home


Family Support: Yes


Long Term Goal: Mobility, Transfers, Self Care


Vital Signs: 


 Last Vital Signs











Temp  97.0 F   07/18/18 07:15


 


Pulse  74   07/18/18 08:56


 


Resp  18   07/18/18 07:15


 


BP  144/66 H  07/18/18 08:56


 


Pulse Ox  93   07/18/18 07:15











Laboratory: 


 Laboratory Last Values











WBC  6.0 K/uL (4.3-10.9)   07/18/18  05:42    


 


RBC  3.34 M/uL (3.86-4.86)  L  07/18/18  05:42    


 


Hgb  10.0 g/dL (12.0-15.0)  L  07/18/18  05:42    


 


Hct  29.6 % (36.0-45.0)  L  07/18/18  05:42    


 


MCV  88.5 fL ()   07/18/18  05:42    


 


MCH  29.8 pg (27.0-35.0)   07/18/18  05:42    


 


MCHC  33.7 g/dL (32.0-36.0)   07/18/18  05:42    


 


RDW  17.2 % (12.1-15.2)  H  07/18/18  05:42    


 


Plt Count  511 K/uL (152-406)  H D 07/18/18  05:42    


 


MPV  7.3 fL (7.6-11.3)  L  07/18/18  05:42    


 


Neutrophils %  48.7 % (41.7-73.7)   07/18/18  05:42    


 


Lymphocytes %  33.1 % (15.3-44.8)   07/18/18  05:42    


 


Monocytes %  11.4 % (3.3-12.3)   07/18/18  05:42    


 


Eosinophils %  6.1 % (0-4.4)  H  07/18/18  05:42    


 


Basophils %  0.7 % (0-1.3)   07/18/18  05:42    


 


Absolute Neutrophils  2.9 K/uL (1.8-8.0)   07/18/18  05:42    


 


Absolute Lymphocytes  2.0 K/uL (0.7-4.9)   07/18/18  05:42    


 


Absolute Monocytes  0.7 K/uL (0.1-1.3)   07/18/18  05:42    


 


Absolute Eosinophils  0.4 K/uL (0-0.5)   07/18/18  05:42    


 


Absolute Basophils  0.0 K/uL (0-0.5)   07/18/18  05:42    


 


Sodium  140 mmol/L (136-145)   07/18/18  05:42    


 


Potassium  4.6 mmol/L (3.5-5.1)   07/18/18  05:42    


 


Chloride  104 mmol/L ()   07/18/18  05:42    


 


Carbon Dioxide  31 mmol/L (21-32)   07/18/18  05:42    


 


BUN  17 mg/dL (7-18)   07/18/18  05:42    


 


Creatinine  0.90 mg/dL (0.55-1.3)   07/18/18  05:42    


 


Estimated GFR  60 mL/min (=/>90)  L  07/18/18  05:42    


 


Glucose  90 mg/dL ()   07/18/18  05:42    


 


Calcium  8.6 mg/dL (8.5-10.1)   07/18/18  05:42    


 


Magnesium  1.8 mg/dL (1.8-2.4)   07/06/18  05:35    


 


Albumin  2.1 g/dL (3.4-5.0)  L  07/18/18  05:42    


 


Prealbumin  16.7 mg/dL (20-40)  L  07/18/18  05:42    


 


Urine Color  Yellow   07/05/18  21:03    


 


Urine Appearance  Clear   07/05/18  21:03    


 


Urine pH  6.5  (5.0-7.0)   07/05/18  21:03    


 


Ur Specific Gravity  <=1.005  (1.005-1.030)   07/05/18  21:03    


 


Urine Ketones  Negative  (NEG)   07/05/18  21:03    


 


Urine Blood  Negative  (NEG)   07/05/18  21:03    


 


Urine Nitrite  Negative  (NEG)   07/05/18  21:03    


 


Urine Bilirubin  Negative  (NEG)   07/05/18  21:03    


 


Urine Urobilinogen  0.2 mg/dL (0.2-1.0)   07/05/18  21:03    


 


Ur Leukocyte Esterase  Negative  (NEG)   07/05/18  21:03    


 


Urine RBC  <5 /HPF (NONE SEEN)   07/05/18  21:03    


 


Urine WBC  <5 /HPF (<5)   07/05/18  21:03    


 


Ur Squamous Epith Cells  <5 /HPF (NONE SEEN)   07/05/18  21:03    


 


Urine Bacteria  <20 /HPF (<20)   07/05/18  21:03    


 


Urine Culture Reflexed  Not needed   07/05/18  21:03    


 


Urine Glucose  Negative  (NEG)   07/05/18  21:03    


 


Urine Total Protein  Negative  (NEG)   07/05/18  21:03    











Weight: 131 lb 4.8 oz


Wound Present: No


Closed Surgical Incision Present: Yes


Negative Pressure Wound Therapy Present: No


Physician Update: Hgb is better at 10.0. WBC is normal at 6.0. Prealbumin is 

better at 16.7. She will need supervison due to not recalling her hip 

precautions. She still requires supervision for activities of daily living.


Medical Issues: UA result in- Cranberry 400mg BId ordered. Instructed to 

increase PO intake


Pain Issues: Tramadol 50mg Q4H PRN.  Norco 10/325mg Q4H PRN


Comment: patient with no new bruising, hematomas or abrasions s/p fall.


Functional Improvement: Patient has met all short-term goals at this time and 

is progressing as expected toward long-term goals.  Patient is presenting w/ 

memory deficits, and confusion.


Functional Improvement Occupational Therapy: PATIENT HAS DEMONSTRATED SBA FOR 

ALL BADL TASKS, INCLUDING FUNCTIONAL TRF, HOWEVER, WHEN HAVING SIGNIFICANT PAIN 

PATIENT FEELS WEAK AND REQUIRES CLOSER SBA AND/OR MIN ASSIST DURING SIT>STAND 

TRF.


Speech Therapy Update: Supervision is required for MAX safety. Patient is able 

to state Hip precautions with MIN A but continues to require MOD to MAX cues 

for safety in function ADLS .


Summary: Patient's care plan and long term goals have been reviewed and revised 

as necessary. Please see the Rehabilitation Signature page for all necessary 

signatures.

## 2018-07-18 NOTE — FAST
SHIFT START DATE/TIME:



07/17/2018 19:00 (CDT)



SHIFT END DATE/TIME:



07/18/2018 07:00 (CDT)



NAME



ESPERANZA CONLEY



YOB: 1935



DATE OF ADMISSION:



07/05/2018 15:57 (CDT)



PHONE:



[phone]



AGE:



83



N#







GENDER:



Female



ENCOUNTER PHYSICIAN:



Dr. Moris Gonzalez M.D.



ADMISSION DIAGNOSIS:



- Orthopaedic Disorders 08 -  Femur (Shaft) Fracture (08.2)

Left Femoral Neck Fracture. 



EATING:



Activity did not occur on this shift



EATING - SCORE:



0-UNK  



GROOMING:



Comb/brush hair

Oral care

Wash, rinse, and dry face

Wash, rinse, and dry hands



GROOMING - STEP 1:



Does the patient require assistance when grooming? Yes.



GROOMING - STEP 2:



Does the patient require the assistance of a helper? Yes.



GROOMING - STEP 3:



How much assistance does the patient require from the helper? Only prior equipment preparation/set up
 from the helper



GROOMING - SCORE:



5-SUP  



BATHING:



Activity did not occur on this shift



BATHING - SCORE:



0-UNK  



DRESSING - UPPER BODY:



Patient is not dressing in public clothing



ARTICLES SCORE



Total number of steps: 0



DRESSING - UPPER BODY - SCORE:



0-UNK  



DRESSING - LOWER BODY:



Patient is not dressing in public clothing



ARTICLES SCORE



Total number of steps: 0



DRESSING - LOWER BODY - SCORE:



0-UNK  



TOILETING:



TOILETING - STEP 1:



Does the patient require assistance with toileting? Yes.



TOILETING - STEP 2:



Does the patient require the assistance of a helper? Yes.



TOILETING - STEP 3:



How much assistance does the patient require from the helper? Hands-on assistance from the helper



TOILETING - STEP 4:



Of the 3 tasks: 1) Adjusting clothing prior to use, 2) Cleansing of perineal area,  3) Adjusting clot
mary after use; How many tasks does the patient perform WITHOUT assistance of the helper? One task



TOILETING - SCORE:



2-MAX  



BLADDER MANAGEMENT:



BLADDER MANAGEMENT - STEP 1:



Does the patient control the bladder completely and intentionally without equipment or devices or med
ications, and is always continent? No.



BLADDER MANAGEMENT - STEP 2:



Does the patient require the assistance of a helper? Yes.



BLADDER MANAGEMENT - STEP 3:



How much assistance does the patient require from the helper? Only set-up of equipment - such as plac
ing it within reach of the patient or emptying a device - to maintain either satisfactory voiding pat
tern or managing an external device, such as an absorbent pad, ileal device, or catheter



BLADDER MANAGEMENT - SCORE:



5-SUP  



BOWEL MANAGEMENT:



Activity did not occur on this shift



BOWEL MANAGEMENT - SCORE:



7-IND  



TRANSFERS: BED, CHAIR, WHEELCHAIR:



TRANSFERS: BED, CHAIR, WHEELCHAIR - STEP 1:



Does the patient require assistance with bed, chair, or wheelchair transfers? Yes.



TRANSFERS: BED, CHAIR, WHEELCHAIR - STEP 2:



Does the patient require the assistance of a helper? Yes.



TRANSFERS: BED, CHAIR, WHEELCHAIR - STEP 3:



How much assistance does the patient require from the helper? Lifting of the legs



TRANSFERS: BED, CHAIR, WHEELCHAIR - STEP 4:



How many legs does the patient require the helper to lift? both legs



TRANSFERS: BED, CHAIR, WHEELCHAIR - SCORE:



3-MOD  



TRANSFERS: TOILET:



TRANSFERS: TOILET - STEP 1:



Does the patient require assistance with toilet transfers? Yes.



TRANSFERS: TOILET - STEP 2:



Does the patient require the assistance of a helper? Yes.



TRANSFERS: TOILET - STEP 3:



How much assistance does the patient require from the helper? Patient performs half or more of the tr
ansferring tasks



TRANSFERS: TOILET - STEP 4:



Does the patient need only incidental help such as contact guard or steadying during toilet transfer?
 Yes.



TRANSFERS: TOILET - SCORE:



4-MIN  



TRANSFERS: SHOWER:



Activity did not occur on this shift



TRANSFERS: SHOWER - SCORE:



0-UNK  



TRANSFERS: TUB:



Activity did not occur on this shift



TRANSFERS: TUB - SCORE:



0-UNK  



LOCOMOTION: WALK:



Activity did not occur on this shift



LOCOMOTION: WALK - SCORE:



0-UNK  



LOCOMOTION: WHEELCHAIR:



Activity did not occur on this shift



LOCOMOTION: WHEELCHAIR - SCORE:



0-UNK  



COMPREHENSION:



COMPREHENSION - STEP 1:



Does the patient require help to understand complex and abstract ideas (such as current events, finan
nora, discharge planning, medical issues, relationships, etc)? Yes.



COMPREHENSION - STEP 2:



Does the patient require help to understand questions or statements about basic needs or ideas (such 
as hunger, thirst, sleep, safety, daily schedule, room location, or discomfort) half or more of the t
saleem? No.



COMPREHENSION - STEP 3:



How often does the patient need help to understand directions and conversation about basic needs? 10%
 - 24% of the time



COMPREHENSION - SCORE:



4-MIN  



EXPRESSION



EXPRESSION - STEP 1:



Does the patient require help expressing complex and abstract ideas (such as current events, finances
, discharge planning, medical issues, relationships, etc)? No.



EXPRESSION - STEP 2:



Does the patient need extra time, require an assistive device (such as augmentive communication syste
m or a communication board), OR does s/he have mild difficulty expressing complex and abstract ideas 
(including mild dysarthria or mild word-find problems)? Yes.



EXPRESSION - SCORE:



6-ANUJA  



SOCIAL INTERACTION:



SOCIAL INTERACTION - STEP 1:



Does the patient require a helper to interact with others in social and therapeutic situations? No.



SOCIAL INTERACTION - STEP 2:



Does the patient need extra time in social situations, OR does s/he interact with staff, other patien
ts, and family members ONLY in structured environments, OR does s/he require medication for social in
teraction? Yes, patient requires medication for social interaction



SOCIAL INTERACTION - SCORE:



6-ANUJA  



PROBLEM SOLVING:



PROBLEM SOLVING - STEP 1:



Does the patient need help to solve complex problems such as managing a checking account or confronti
ng interpersonal problems? Yes.



PROBLEM SOLVING - STEP 2:



Does the patient solve basic routine problems half or more of the time? Yes.



PROBLEM SOLVING - STEP 3:



How often does the patient need help to solve basic routine problems? 10%-24% of the time



PROBLEM SOLVING - SCORE:



4-MIN  



MEMORY:



MEMORY - STEP 1:



Does the patient need help to remember frequently encountered people, daily routines, and executing r
equests? No.



MEMORY - STEP 2:



Does the patient have slight difficulty recognizing frequently encountered people, daily routines, or
 executing requests without the need for repetition or using self-initiated or environmental cues to 
remember? Yes.



MEMORY - SCORE:



6-ANUJA  



SIGNATURE PANEL:



The following modified sections: Eating - Score, Grooming - Score, Dressing - Upper Body - Score, Rip
ssing - Lower Body - Score, Toileting - Score, Bladder Management - Score, Bowel Management - Score, 
Transfers: Bed, Chair, Wheelchair - Score, Transfers: Toilet - Score, Transfers: Shower - Score, Young
sfers: Tub - Score, Locomotion: Walk - Score, Locomotion: Wheelchair - Score, Comprehension - Score, 
Expression - Score, Social Interaction - Score, Problem Solving - Score, Memory - Score were [electro
nically] signed by Liliya Caceres CNA on Wed Jul 18 2018 01:45:41 GMT-0500 (Central Daylight Time)

## 2018-07-18 NOTE — FAST
ENCOUNTER DATE AND TIME:



07/18/2018 08:00 (CDT)



NAME



ESPERANZA CONLEY



YOB: 1935



DATE OF ADMISSION:



07/05/2018 15:57 (CDT)



PHONE:



[phone]



AGE:



83



N#







GENDER:



Female



ENCOUNTER PHYSICIAN:



Dr. Moris Gonzalez M.D.



ADMISSION DIAGNOSIS:



- Orthopaedic Disorders 08 -  Femur (Shaft) Fracture (08.2)

Left Femoral Neck Fracture. 



EATING:



Activity did not occur on this shift



EATING - SCORE:



0-UNK  



GROOMING:



Activity did not occur on this shift



GROOMING - SCORE:



0-UNK  



BATHING:



Activity did not occur on this shift



BATHING - SCORE:



0-UNK  



DRESSING - UPPER BODY:



Activity did not occur on this shift

Patient is not dressing in public clothing



ARTICLES SCORE



Total number of steps: 0



DRESSING - UPPER BODY - SCORE:



0-UNK  



DRESSING - LOWER BODY:



Activity did not occur on this shift

Patient is not dressing in public clothing



ARTICLES SCORE



Total number of steps: 0



DRESSING - LOWER BODY - SCORE:



0-UNK  



TOILETING:



Activity did not occur on this shift



TOILETING - SCORE:



0-UNK  



BLADDER MANAGEMENT:



Activity did not occur on this shift



BLADDER MANAGEMENT - SCORE:



7-IND  



BOWEL MANAGEMENT:



Activity did not occur on this shift



BOWEL MANAGEMENT - SCORE:



7-IND  



TRANSFERS: BED, CHAIR, WHEELCHAIR:



Activity did not occur on this shift



TRANSFERS: BED, CHAIR, WHEELCHAIR - SCORE:



0-UNK  



TRANSFERS: TOILET:



Activity did not occur on this shift



TRANSFERS: TOILET - SCORE:



0-UNK  



TRANSFERS: SHOWER:



Activity did not occur on this shift



TRANSFERS: SHOWER - SCORE:



0-UNK  



TRANSFERS: TUB:



Activity did not occur on this shift



TRANSFERS: TUB - SCORE:



0-UNK  



LOCOMOTION: WALK:



Activity did not occur on this shift



LOCOMOTION: WALK - SCORE:



0-UNK  



LOCOMOTION: WHEELCHAIR:



Activity did not occur on this shift



LOCOMOTION: WHEELCHAIR - SCORE:



0-UNK  



LOCOMOTION: STAIRS:



Activity did not occur on this shift



LOCOMOTION: STAIRS - SCORE:



0-UNK  



COMPREHENSION:



COMPREHENSION - STEP 1:



Does the patient require help to understand complex and abstract ideas (such as current events, finan
nora, discharge planning, medical issues, relationships, etc)? Yes.



COMPREHENSION - STEP 2:



Does the patient require help to understand questions or statements about basic needs or ideas (such 
as hunger, thirst, sleep, safety, daily schedule, room location, or discomfort) half or more of the t
saleem? No.



COMPREHENSION - STEP 3:



How often does the patient need help to understand directions and conversation about basic needs? Les
s than 10% of the time



COMPREHENSION - SCORE:



5-SUP  



EXPRESSION



EXPRESSION - STEP 1:



Does the patient require help expressing complex and abstract ideas (such as current events, finances
, discharge planning, medical issues, relationships, etc)? No.



EXPRESSION - STEP 2:



Does the patient need extra time, require an assistive device (such as augmentive communication syste
m or a communication board), OR does s/he have mild difficulty expressing complex and abstract ideas 
(including mild dysarthria or mild word-find problems)? Yes.



EXPRESSION - SCORE:



6-ANUJA  



SOCIAL INTERACTION:



SOCIAL INTERACTION - STEP 1:



Does the patient require a helper to interact with others in social and therapeutic situations? No.



SOCIAL INTERACTION - STEP 2:



Does the patient need extra time in social situations, OR does s/he interact with staff, other patien
ts, and family members ONLY in structured environments, OR does s/he require medication for social in
teraction? Yes, patient needs extra time



SOCIAL INTERACTION - SCORE:



6-ANUJA  



PROBLEM SOLVING:



PROBLEM SOLVING - STEP 1:



Does the patient need help to solve complex problems such as managing a checking account or confronti
ng interpersonal problems? Yes.



PROBLEM SOLVING - STEP 2:



Does the patient solve basic routine problems half or more of the time? Yes.



PROBLEM SOLVING - STEP 3:



How often does the patient need help to solve basic routine problems? 25%-49% of the time



PROBLEM SOLVING - SCORE:



3-MOD  



MEMORY:



MEMORY - STEP 1:



Does the patient need help to remember frequently encountered people, daily routines, and executing r
equests? Yes.



MEMORY - STEP 2:



How often does the patient need help to remember frequently encountered people, daily routines, and e
xecuting requests? 25% - 49% of the time



MEMORY - SCORE:



3-MOD  



SIGNATURE PANEL:



The following modified sections: Comprehension - Score, Expression - Score, Social Interaction - Scor
e, Problem Solving - Score, Memory - Score were [electronically] signed by ST Jj on Wed Ju
l 18 2018 18:57:02 T-0500 (Central Daylight Time)

## 2018-07-18 NOTE — FAST
ENCOUNTER DATE AND TIME:



07/18/2018 08:00 (CDT)



NAME



ESPERANZA CONLEY



YOB: 1935



DATE OF ADMISSION:



07/05/2018 15:57 (CDT)



PHONE:



[phone]



AGE:



83



N#







GENDER:



Female



ENCOUNTER PHYSICIAN:



Dr. Moris Gonzalez M.D.



ADMISSION DIAGNOSIS:



- Orthopaedic Disorders 08 -  Femur (Shaft) Fracture (08.2)

Left Femoral Neck Fracture. 



EATING:



Activity did not occur on this shift



EATING - SCORE:



0-UNK  



GROOMING:



Activity did not occur on this shift



GROOMING - SCORE:



0-UNK  



BATHING:



Activity did not occur on this shift



BATHING - SCORE:



0-UNK  



DRESSING - UPPER BODY:



Activity did not occur on this shift

Patient is not dressing in public clothing



ARTICLES SCORE



Total number of steps: 0



DRESSING - UPPER BODY - SCORE:



0-UNK  



DRESSING - LOWER BODY:



Activity did not occur on this shift

Patient is not dressing in public clothing



ARTICLES SCORE



Total number of steps: 0



DRESSING - LOWER BODY - SCORE:



0-UNK  



TOILETING:



Activity did not occur on this shift



TOILETING - SCORE:



0-UNK  



BLADDER MANAGEMENT:



Activity did not occur on this shift



BLADDER MANAGEMENT - SCORE:



7-IND  



BOWEL MANAGEMENT:



Activity did not occur on this shift



BOWEL MANAGEMENT - SCORE:



7-IND  



TRANSFERS: BED, CHAIR, WHEELCHAIR:



TRANSFERS: BED, CHAIR, WHEELCHAIR - STEP 1:



Does the patient require assistance with bed, chair, or wheelchair transfers? Yes.



TRANSFERS: BED, CHAIR, WHEELCHAIR - STEP 2:



Does the patient require the assistance of a helper? Yes.



TRANSFERS: BED, CHAIR, WHEELCHAIR - STEP 3:



How much assistance does the patient require from the helper? Steadying/guiding assistance



TRANSFERS: BED, CHAIR, WHEELCHAIR - SCORE:



4-MIN  



TRANSFERS: TOILET:



TRANSFERS: TOILET - STEP 1:



Does the patient require assistance with toilet transfers? Yes.



TRANSFERS: TOILET - STEP 2:



Does the patient require the assistance of a helper? Yes.



TRANSFERS: TOILET - STEP 3:



How much assistance does the patient require from the helper? Patient performs half or more of the tr
ansferring tasks



TRANSFERS: TOILET - STEP 4:



Does the patient need only incidental help such as contact guard or steadying during toilet transfer?
 Yes.



TRANSFERS: TOILET - SCORE:



4-MIN  



TRANSFERS: SHOWER:



Activity did not occur on this shift



TRANSFERS: SHOWER - SCORE:



0-UNK  



TRANSFERS: TUB:



Activity did not occur on this shift



TRANSFERS: TUB - SCORE:



0-UNK  



LOCOMOTION: WALK:



LOCOMOTION: WALK - STEP 1:



Does the patient need help to walk 150 feet? Yes.



LOCOMOTION: WALK - STEP 2:



How much assistance does the patient require to walk a minimum of 150 feet? Only incidental help such
 as contact guarding or steadying



LOCOMOTION: WALK - SCORE:



4-MIN  



LOCOMOTION: WHEELCHAIR:



LOCOMOTION: WHEELCHAIR - STEP 1:



Does the patient need help to go 150 feet in a wheelchair? Yes.



LOCOMOTION: WHEELCHAIR - STEP 2:



How much assistance does the patient need from the helper? Only supervision, cuing, or coaxing



LOCOMOTION: WHEELCHAIR - SCORE:



5-SUP  



LOCOMOTION: STAIRS:



Activity did not occur on this shift



LOCOMOTION: STAIRS - SCORE:



0-UNK  



COMPREHENSION:



COMPREHENSION - SCORE:



0-UNK  



EXPRESSION



EXPRESSION - SCORE:



0-UNK  



SOCIAL INTERACTION:



SOCIAL INTERACTION - SCORE:



0-UNK  



PROBLEM SOLVING:



PROBLEM SOLVING - SCORE:



0-UNK  



MEMORY:



MEMORY - SCORE:



0-UNK  



SIGNATURE PANEL:



The following modified sections: Transfers: Bed, Chair, Wheelchair - Score, Transfers: Toilet - Score
, Locomotion: Walk - Score, Locomotion: Wheelchair - Score, Locomotion: Stairs - Score were [nhung louis] signed by Ynes Goyal PTA on Wed Jul 18 2018 14:56:49 T-0500 (Central Daylight Time)

## 2018-07-19 VITALS — SYSTOLIC BLOOD PRESSURE: 143 MMHG | DIASTOLIC BLOOD PRESSURE: 66 MMHG | TEMPERATURE: 97 F

## 2018-07-19 RX ADMIN — BISOPROLOL FUMARATE SCH MG: 5 TABLET, COATED ORAL at 07:58

## 2018-07-19 RX ADMIN — LEVOTHYROXINE SODIUM SCH MG: 0.1 TABLET ORAL at 05:31

## 2018-07-19 RX ADMIN — Medication SCH MG: at 07:55

## 2018-07-19 RX ADMIN — Medication SCH PATCH: at 07:55

## 2018-07-19 RX ADMIN — Medication SCH: at 07:58

## 2018-07-19 RX ADMIN — HYDROCODONE BITARTRATE AND ACETAMINOPHEN PRN TAB: 10; 325 TABLET ORAL at 13:26

## 2018-07-19 RX ADMIN — Medication SCH TAB: at 07:55

## 2018-07-19 RX ADMIN — MONTELUKAST SODIUM SCH MG: 10 TABLET, FILM COATED ORAL at 07:57

## 2018-07-19 RX ADMIN — GABAPENTIN SCH MG: 300 CAPSULE ORAL at 07:56

## 2018-07-19 RX ADMIN — TRAMADOL HYDROCHLORIDE PRN MG: 50 TABLET, COATED ORAL at 12:00

## 2018-07-19 RX ADMIN — AMIODARONE HYDROCHLORIDE SCH MG: 200 TABLET ORAL at 07:57

## 2018-07-19 RX ADMIN — ISOSORBIDE MONONITRATE SCH MG: 30 TABLET, EXTENDED RELEASE ORAL at 07:57

## 2018-07-19 RX ADMIN — Medication SCH ML: at 07:58

## 2018-07-19 RX ADMIN — IRON SUPPLEMENT SCH MG: 325 TABLET ORAL at 07:57

## 2018-07-19 RX ADMIN — PANTOPRAZOLE SODIUM SCH MG: 40 TABLET, DELAYED RELEASE ORAL at 07:57

## 2018-07-19 RX ADMIN — DOXEPIN HYDROCHLORIDE SCH MG: 10 CAPSULE ORAL at 07:57

## 2018-07-19 RX ADMIN — ENOXAPARIN SODIUM SCH MG: 30 INJECTION SUBCUTANEOUS at 16:22

## 2018-07-19 RX ADMIN — HYDROCODONE BITARTRATE AND ACETAMINOPHEN PRN TAB: 10; 325 TABLET ORAL at 07:57

## 2018-07-19 RX ADMIN — TRAMADOL HYDROCHLORIDE PRN MG: 50 TABLET, COATED ORAL at 16:22

## 2018-07-19 RX ADMIN — SERTRALINE HYDROCHLORIDE SCH MG: 100 TABLET ORAL at 07:58

## 2018-07-19 NOTE — FAST
ENCOUNTER DATE AND TIME:



07/19/2018 08:00 (CDT)



NAME



ESPERANZA CONLEY



YOB: 1935



DATE OF ADMISSION:



07/05/2018 15:57 (CDT)



PHONE:



[phone]



AGE:



83



N#







GENDER:



Female



ENCOUNTER PHYSICIAN:



Dr. Moris Gonzalez M.D.



ADMISSION DIAGNOSIS:



- Orthopaedic Disorders 08 -  Femur (Shaft) Fracture (08.2)

Left Femoral Neck Fracture. 



EATING:



Activity did not occur on this shift



EATING - SCORE:



0-UNK  



GROOMING:



Activity did not occur on this shift



GROOMING - SCORE:



0-UNK  



BATHING:



Activity did not occur on this shift



BATHING - SCORE:



0-UNK  



DRESSING - UPPER BODY:



Activity did not occur on this shift

Patient is not dressing in public clothing



ARTICLES SCORE



Total number of steps: 0



DRESSING - UPPER BODY - SCORE:



0-UNK  



DRESSING - LOWER BODY:



Activity did not occur on this shift

Patient is not dressing in public clothing



ARTICLES SCORE



Total number of steps: 0



DRESSING - LOWER BODY - SCORE:



0-UNK  



TOILETING:



Activity did not occur on this shift



TOILETING - SCORE:



0-UNK  



BLADDER MANAGEMENT:



Activity did not occur on this shift



BLADDER MANAGEMENT - SCORE:



7-IND  



BOWEL MANAGEMENT:



Activity did not occur on this shift



BOWEL MANAGEMENT - SCORE:



7-IND  



TRANSFERS: BED, CHAIR, WHEELCHAIR:



Activity did not occur on this shift



TRANSFERS: BED, CHAIR, WHEELCHAIR - SCORE:



0-UNK  



TRANSFERS: TOILET:



Activity did not occur on this shift



TRANSFERS: TOILET - SCORE:



0-UNK  



TRANSFERS: SHOWER:



Activity did not occur on this shift



TRANSFERS: SHOWER - SCORE:



0-UNK  



TRANSFERS: TUB:



Activity did not occur on this shift



TRANSFERS: TUB - SCORE:



0-UNK  



LOCOMOTION: WALK:



Activity did not occur on this shift



LOCOMOTION: WALK - SCORE:



0-UNK  



LOCOMOTION: WHEELCHAIR:



Activity did not occur on this shift



LOCOMOTION: WHEELCHAIR - SCORE:



0-UNK  



LOCOMOTION: STAIRS:



Activity did not occur on this shift



LOCOMOTION: STAIRS - SCORE:



0-UNK  



COMPREHENSION:



COMPREHENSION - STEP 1:



Does the patient require help to understand complex and abstract ideas (such as current events, finan
nora, discharge planning, medical issues, relationships, etc)? Yes.



COMPREHENSION - STEP 2:



Does the patient require help to understand questions or statements about basic needs or ideas (such 
as hunger, thirst, sleep, safety, daily schedule, room location, or discomfort) half or more of the t
saleem? No.



COMPREHENSION - STEP 3:



How often does the patient need help to understand directions and conversation about basic needs? Les
s than 10% of the time



COMPREHENSION - SCORE:



5-SUP  



EXPRESSION



EXPRESSION - STEP 1:



Does the patient require help expressing complex and abstract ideas (such as current events, finances
, discharge planning, medical issues, relationships, etc)? No.



EXPRESSION - STEP 2:



Does the patient need extra time, require an assistive device (such as augmentive communication syste
m or a communication board), OR does s/he have mild difficulty expressing complex and abstract ideas 
(including mild dysarthria or mild word-find problems)? Yes.



EXPRESSION - SCORE:



6-ANUJA  



SOCIAL INTERACTION:



SOCIAL INTERACTION - STEP 1:



Does the patient require a helper to interact with others in social and therapeutic situations? No.



SOCIAL INTERACTION - STEP 2:



Does the patient need extra time in social situations, OR does s/he interact with staff, other patien
ts, and family members ONLY in structured environments, OR does s/he require medication for social in
teraction? Yes, patient needs extra time



SOCIAL INTERACTION - SCORE:



6-ANUJA  



PROBLEM SOLVING:



PROBLEM SOLVING - STEP 1:



Does the patient need help to solve complex problems such as managing a checking account or confronti
ng interpersonal problems? Yes.



PROBLEM SOLVING - STEP 2:



Does the patient solve basic routine problems half or more of the time? Yes.



PROBLEM SOLVING - STEP 3:



How often does the patient need help to solve basic routine problems? 10%-24% of the time



PROBLEM SOLVING - SCORE:



4-MIN  



MEMORY:



MEMORY - STEP 1:



Does the patient need help to remember frequently encountered people, daily routines, and executing r
equests? Yes.



MEMORY - STEP 2:



How often does the patient need help to remember frequently encountered people, daily routines, and e
xecuting requests? 10% - 24% of the time



MEMORY - SCORE:



4-MIN  



SIGNATURE PANEL:



The following modified sections: Comprehension - Score, Expression - Score, Social Interaction - Scor
e, Problem Solving - Score, Memory - Score were [electronically] signed by ST Jj on Thu Ju
l 19 2018 16:44:56 T-0500 (Central Daylight Time)

## 2018-07-19 NOTE — FAST
ENCOUNTER DATE AND TIME:



07/19/2018 08:00 (CDT)



NAME



ESPERANZA CONLEY



YOB: 1935



DATE OF ADMISSION:



07/05/2018 15:57 (CDT)



PHONE:



[phone]



AGE:



83



N#







GENDER:



Female



ENCOUNTER PHYSICIAN:



Dr. Moris Gonzalez M.D.



ADMISSION DIAGNOSIS:



- Orthopaedic Disorders 08 -  Femur (Shaft) Fracture (08.2)

Left Femoral Neck Fracture. 



EATING:



Activity did not occur on this shift



EATING - SCORE:



0-UNK  



GROOMING:



Activity did not occur on this shift



GROOMING - SCORE:



0-UNK  



BATHING:



Activity did not occur on this shift



BATHING - SCORE:



0-UNK  



DRESSING - UPPER BODY:



Activity did not occur on this shift

Patient is not dressing in public clothing



ARTICLES SCORE



Total number of steps: 0



DRESSING - UPPER BODY - SCORE:



0-UNK  



DRESSING - LOWER BODY:



Activity did not occur on this shift

Patient is not dressing in public clothing



ARTICLES SCORE



Total number of steps: 0



DRESSING - LOWER BODY - SCORE:



0-UNK  



TOILETING:



Activity did not occur on this shift



TOILETING - SCORE:



0-UNK  



BLADDER MANAGEMENT:



Activity did not occur on this shift



BLADDER MANAGEMENT - SCORE:



7-IND  



BOWEL MANAGEMENT:



Activity did not occur on this shift



BOWEL MANAGEMENT - SCORE:



7-IND  



TRANSFERS: BED, CHAIR, WHEELCHAIR:



TRANSFERS: BED, CHAIR, WHEELCHAIR - STEP 1:



Does the patient require assistance with bed, chair, or wheelchair transfers? Yes.



TRANSFERS: BED, CHAIR, WHEELCHAIR - STEP 2:



Does the patient require the assistance of a helper? Yes.



TRANSFERS: BED, CHAIR, WHEELCHAIR - STEP 3:



How much assistance does the patient require from the helper? Only supervision



TRANSFERS: BED, CHAIR, WHEELCHAIR - SCORE:



5-SUP  



TRANSFERS: TOILET:



TRANSFERS: TOILET - STEP 1:



Does the patient require assistance with toilet transfers? Yes.



TRANSFERS: TOILET - STEP 2:



Does the patient require the assistance of a helper? Yes.



TRANSFERS: TOILET - STEP 3:



How much assistance does the patient require from the helper? Only supervision, cuing, coaxing, OR he
lp to set out transfer equipment or to lock brakes and/or lift foot rests



TRANSFERS: TOILET - SCORE:



5-SUP  



TRANSFERS: SHOWER:



Activity did not occur on this shift



TRANSFERS: SHOWER - SCORE:



0-UNK  



TRANSFERS: TUB:



Activity did not occur on this shift



TRANSFERS: TUB - SCORE:



0-UNK  



LOCOMOTION: WALK:



LOCOMOTION: WALK - STEP 1:



Does the patient need help to walk 150 feet? Yes.



LOCOMOTION: WALK - STEP 2:



How much assistance does the patient require to walk a minimum of 150 feet? Only incidental help such
 as contact guarding or steadying



LOCOMOTION: WALK - SCORE:



4-MIN  



LOCOMOTION: WHEELCHAIR:



LOCOMOTION: WHEELCHAIR - STEP 1:



Does the patient need help to go 150 feet in a wheelchair? Yes.



LOCOMOTION: WHEELCHAIR - STEP 2:



How much assistance does the patient need from the helper? Only supervision, cuing, or coaxing



LOCOMOTION: WHEELCHAIR - SCORE:



5-SUP  



LOCOMOTION: STAIRS:



LOCOMOTION: STAIRS - STEP 1:



Does the patient need help to go up and down 12 to 14 stairs? Yes.



LOCOMOTION: STAIRS - STEP 2:



How much assistance does the patient need from the helper to go a minimum of 12 to 14 stairs? Only in
cidental help such as contact guarding or steadying



LOCOMOTION: STAIRS - SCORE:



4-MIN  



COMPREHENSION:



COMPREHENSION - SCORE:



0-UNK  



EXPRESSION



EXPRESSION - SCORE:



0-UNK  



SOCIAL INTERACTION:



SOCIAL INTERACTION - SCORE:



0-UNK  



PROBLEM SOLVING:



PROBLEM SOLVING - SCORE:



0-UNK  



MEMORY:



MEMORY - SCORE:



0-UNK  



SIGNATURE PANEL:



The following modified sections: Transfers: Bed, Chair, Wheelchair - Score, Transfers: Toilet - Score
, Locomotion: Walk - Score, Locomotion: Wheelchair - Score, Locomotion: Stairs - Score were [nhung louis] signed by Ynes Goyal PTA on Thu Jul 19 2018 15:01:45 GMT-0500 (Central Daylight Time)

## 2018-07-19 NOTE — FAST
ENCOUNTER DATE AND TIME:



07/16/2018 08:00 (CDT)



NAME



ESPERANZA CONLEY



YOB: 1935



DATE OF ADMISSION:



07/05/2018 15:57 (CDT)



PHONE:



[phone]



AGE:



83



N#







GENDER:



Female



ENCOUNTER PHYSICIAN:



Dr. Moris Gonzalez M.D.



ADMISSION DIAGNOSIS:



- Orthopaedic Disorders 08 -  Femur (Shaft) Fracture (08.2)

Left Femoral Neck Fracture. 



EATING:



Activity did not occur on this shift



EATING - SCORE:



0-UNK  



GROOMING:



Activity did not occur on this shift



GROOMING - SCORE:



0-UNK  



BATHING:



Activity did not occur on this shift



BATHING - SCORE:



0-UNK  



DRESSING - UPPER BODY:



Activity did not occur on this shift

Patient is not dressing in public clothing



ARTICLES SCORE



Total number of steps: 0



DRESSING - UPPER BODY - SCORE:



0-UNK  



DRESSING - LOWER BODY:



Activity did not occur on this shift

Patient is not dressing in public clothing



ARTICLES SCORE



Total number of steps: 0



DRESSING - LOWER BODY - SCORE:



0-UNK  



TOILETING:



Activity did not occur on this shift



TOILETING - SCORE:



0-UNK  



BLADDER MANAGEMENT:



Activity did not occur on this shift



BLADDER MANAGEMENT - SCORE:



7-IND  



BOWEL MANAGEMENT:



Activity did not occur on this shift



BOWEL MANAGEMENT - SCORE:



7-IND  



TRANSFERS: BED, CHAIR, WHEELCHAIR:



Activity did not occur on this shift



TRANSFERS: BED, CHAIR, WHEELCHAIR - SCORE:



0-UNK  



TRANSFERS: TOILET:



Activity did not occur on this shift



TRANSFERS: TOILET - SCORE:



0-UNK  



TRANSFERS: SHOWER:



Activity did not occur on this shift



TRANSFERS: SHOWER - SCORE:



0-UNK  



TRANSFERS: TUB:



Activity did not occur on this shift



TRANSFERS: TUB - SCORE:



0-UNK  



LOCOMOTION: WALK:



Activity did not occur on this shift



LOCOMOTION: WALK - SCORE:



0-UNK  



LOCOMOTION: WHEELCHAIR:



Activity did not occur on this shift



LOCOMOTION: WHEELCHAIR - SCORE:



0-UNK  



LOCOMOTION: STAIRS:



Activity did not occur on this shift



LOCOMOTION: STAIRS - SCORE:



0-UNK  



COMPREHENSION:



COMPREHENSION - SCORE:



0-UNK  



EXPRESSION



EXPRESSION - SCORE:



0-UNK  



SOCIAL INTERACTION:



SOCIAL INTERACTION - SCORE:



0-UNK  



PROBLEM SOLVING:



PROBLEM SOLVING - SCORE:



0-UNK  



MEMORY:



MEMORY - SCORE:



0-UNK  



SIGNATURE PANEL:



The following modified sections: Transfers: Bed, Chair, Wheelchair - Score, Transfers: Toilet - Score
, Locomotion: Walk - Score, Locomotion: Wheelchair - Score, Locomotion: Stairs - Score were [electron
ically] signed by Forest Roberts PTA on Thu Jul 19 2018 16:40:23 GMT-0500 (Central Daylight Time)

## 2018-07-19 NOTE — FAST
SHIFT START DATE/TIME:



07/19/2018 07:00 (CDT)



SHIFT END DATE/TIME:



07/19/2018 19:00 (CDT)



NAME



ESPERANZA CONLEY



YOB: 1935



DATE OF ADMISSION:



07/05/2018 15:57 (CDT)



PHONE:



[phone]



AGE:



83



N#







GENDER:



Female



ENCOUNTER PHYSICIAN:



Dr. Moris Gonzalez M.D.



ADMISSION DIAGNOSIS:



- Orthopaedic Disorders 08 -  Femur (Shaft) Fracture (08.2)

Left Femoral Neck Fracture. 



EATING:



EATING - STEP 1:



Does the patient require assistance when eating? No.



EATING - SCORE:



7-IND  



GROOMING:



Comb/brush hair

Wash, rinse, and dry hands



GROOMING - STEP 1:



Does the patient require assistance when grooming? Yes.



GROOMING - STEP 2:



Does the patient require the assistance of a helper? No. The patient only requires an assistive devic
e, OR takes more than reasonable time to groom, OR there is a concern for safety as the patient groom
s



GROOMING - SCORE:



6-ANUJA  



BATHING:



Activity did not occur on this shift



BATHING - SCORE:



0-UNK  



DRESSING - UPPER BODY:



Sweater (four steps)  



ARTICLES SCORE



Total number of steps: 4



DRESSING - UPPER BODY - STEP 1:



Does the patient require help when dressing above the waist? Yes.



DRESSING - UPPER BODY - STEP 2:



Does the patient require the assistance of a helper? Yes.



DRESSING - UPPER BODY - STEP 3:



Does the helper touch the patient while dressing? Yes.



DRESSING - UPPER BODY - STEP 4:



How many of the total steps does the patient complete on his/her own? 3



DRESSING - UPPER BODY - SCORE:



4-MIN  



DRESSING - LOWER BODY:



Elastic waist pants (three steps)  

Tied or buckled shoe - Left foot (two steps)  

Tied or buckled shoe - Right foot (two steps)  



ARTICLES SCORE



Total number of steps: 7



DRESSING - LOWER BODY - STEP 1:



Does the patient require help when dressing below the waist? Yes.



DRESSING - LOWER BODY - STEP 2:



Does the patient require the assistance of a helper? No. Patient requires an assistive device such as
 a reacher. OR s/he takes more than reasonable time as s/he dresses the lower body, OR there is a con
cern for safety when s/he dresses the lower body



DRESSING - LOWER BODY - SCORE:



6-ANUJA  



TOILETING:



TOILETING - STEP 1:



Does the patient require assistance with toileting? Yes.



TOILETING - STEP 2:



Does the patient require the assistance of a helper? No.



TOILETING - SCORE:



6-ANUJA  



BLADDER MANAGEMENT:



BLADDER MANAGEMENT - STEP 1:



Does the patient control the bladder completely and intentionally without equipment or devices or med
ications, and is always continent? Yes.



BLADDER MANAGEMENT - SCORE:



7-IND  



BLADDER MANAGEMENT - FREQUENCY OF ACCIDENTS:



BLADDER MANAGEMENT(FA) - STEP 1:



How many accidents has the patient had during the current shift? 0



BOWEL MANAGEMENT:



BOWEL MANAGEMENT - STEP 1:



Does the patient control bowels completely and intentionally without equipment devices or medications
 AND is always continent? Yes.



BOWEL MANAGEMENT - SCORE:



7-IND  



BOWEL MANAGEMENT - FREQUENCY OF ACCIDENTS:



BOWEL MANAGEMENT(FA) - STEP 1:



How many accidents has the patient had during the current shift? 0



TRANSFERS: BED, CHAIR, WHEELCHAIR:



TRANSFERS: BED, CHAIR, WHEELCHAIR - STEP 1:



Does the patient require assistance with bed, chair, or wheelchair transfers? Yes.



TRANSFERS: BED, CHAIR, WHEELCHAIR - STEP 2:



Does the patient require the assistance of a helper? Yes.



TRANSFERS: BED, CHAIR, WHEELCHAIR - STEP 3:



How much assistance does the patient require from the helper? Steadying/guiding assistance



TRANSFERS: BED, CHAIR, WHEELCHAIR - SCORE:



4-MIN  



TRANSFERS: TOILET:



TRANSFERS: TOILET - STEP 1:



Does the patient require assistance with toilet transfers? Yes.



TRANSFERS: TOILET - STEP 2:



Does the patient require the assistance of a helper? Yes.



TRANSFERS: TOILET - STEP 3:



How much assistance does the patient require from the helper? Only supervision, cuing, coaxing, OR he
lp to set out transfer equipment or to lock brakes and/or lift foot rests



TRANSFERS: TOILET - SCORE:



5-SUP  



TRANSFERS: SHOWER:



Activity did not occur on this shift



TRANSFERS: SHOWER - SCORE:



0-UNK  



TRANSFERS: TUB:



Activity did not occur on this shift



TRANSFERS: TUB - SCORE:



0-UNK  



LOCOMOTION: WALK:



Activity did not occur on this shift



LOCOMOTION: WALK - SCORE:



0-UNK  



LOCOMOTION: WHEELCHAIR:



Activity did not occur on this shift



LOCOMOTION: WHEELCHAIR - SCORE:



0-UNK  



COMPREHENSION:



COMPREHENSION - SCORE:



0-UNK  



EXPRESSION



EXPRESSION - SCORE:



0-UNK  



SOCIAL INTERACTION:



SOCIAL INTERACTION - SCORE:



0-UNK  



PROBLEM SOLVING:



PROBLEM SOLVING - SCORE:



0-UNK  



MEMORY:



MEMORY - SCORE:



0-UNK  



SIGNATURE PANEL:



The following modified sections: Eating - Score, Grooming - Score, Bathing - Score, Dressing - Upper 
Body - Score, Dressing - Lower Body - Score, Toileting - Score, Bladder Management - Score, Bowel Man
agement - Score, Transfers: Bed, Chair, Wheelchair - Score, Transfers: Toilet - Score, Transfers: Fabi
wer - Score, Transfers: Tub - Score, Locomotion: Walk - Score, Locomotion: Wheelchair - Score, Compre
hension - Score, Expression - Score, Social Interaction - Score, Problem Solving - Score, Memory - Sc
ore were [electronically] signed by Leidy Meza CNA on Thu Jul 19 2018 13:34:55 T-0500 (Centra
l Daylight Time)

## 2018-07-19 NOTE — FAST
SHIFT START DATE/TIME:



07/18/2018 19:00 (CDT)



SHIFT END DATE/TIME:



07/19/2018 07:00 (CDT)



NAME



ESPERANZA CONLEY



YOB: 1935



DATE OF ADMISSION:



07/05/2018 15:57 (CDT)



PHONE:



[phone]



AGE:



83



[facility]#







GENDER:



Female



ENCOUNTER PHYSICIAN:



Dr. Moris Gonzalez M.D.



ADMISSION DIAGNOSIS:



- Orthopaedic Disorders 08 -  Femur (Shaft) Fracture (08.2)

Left Femoral Neck Fracture. 



EATING:



Activity did not occur on this shift



EATING - SCORE:



0-UNK  



GROOMING:



Oral care

Wash, rinse, and dry face

Wash, rinse, and dry hands



GROOMING - STEP 1:



Does the patient require assistance when grooming? Yes.



GROOMING - STEP 2:



Does the patient require the assistance of a helper? Yes.



GROOMING - STEP 3:



How much assistance does the patient require from the helper? Cuing, coaxing, instructions, or encour
agement for completion of grooming



GROOMING - SCORE:



5-SUP  



BATHING:



Activity did not occur on this shift



BATHING - SCORE:



0-UNK  



DRESSING - UPPER BODY:



Patient is not dressing in public clothing



ARTICLES SCORE



Total number of steps: 0



DRESSING - UPPER BODY - SCORE:



0-UNK  



DRESSING - LOWER BODY:



Patient is not dressing in public clothing



ARTICLES SCORE



Total number of steps: 0



DRESSING - LOWER BODY - SCORE:



0-UNK  



TOILETING:



TOILETING - STEP 1:



Does the patient require assistance with toileting? Yes.



TOILETING - STEP 2:



Does the patient require the assistance of a helper? Yes.



TOILETING - STEP 3:



How much assistance does the patient require from the helper? Hands-on assistance from the helper



TOILETING - STEP 4:



Of the 3 tasks: 1) Adjusting clothing prior to use, 2) Cleansing of perineal area,  3) Adjusting clot
mary after use; How many tasks does the patient perform WITHOUT assistance of the helper? Three tasks
 with steadying assistance from the helper



TOILETING - SCORE:



4-MIN  



BLADDER MANAGEMENT:



BLADDER MANAGEMENT - STEP 1:



Does the patient control the bladder completely and intentionally without equipment or devices or med
ications, and is always continent? No.



BLADDER MANAGEMENT - STEP 2:



Does the patient require the assistance of a helper? Yes.



BLADDER MANAGEMENT - STEP 3:



How much assistance does the patient require from the helper? Only set-up of equipment - such as plac
ing it within reach of the patient or emptying a device - to maintain either satisfactory voiding pat
tern or managing an external device, such as an absorbent pad, ileal device, or catheter



BLADDER MANAGEMENT - SCORE:



5-SUP  



BOWEL MANAGEMENT:



Activity did not occur on this shift



BOWEL MANAGEMENT - SCORE:



7-IND  



TRANSFERS: BED, CHAIR, WHEELCHAIR:



TRANSFERS: BED, CHAIR, WHEELCHAIR - STEP 1:



Does the patient require assistance with bed, chair, or wheelchair transfers? Yes.



TRANSFERS: BED, CHAIR, WHEELCHAIR - STEP 2:



Does the patient require the assistance of a helper? Yes.



TRANSFERS: BED, CHAIR, WHEELCHAIR - STEP 3:



How much assistance does the patient require from the helper? Steadying/guiding assistance



TRANSFERS: BED, CHAIR, WHEELCHAIR - SCORE:



4-MIN  



TRANSFERS: TOILET:



TRANSFERS: TOILET - STEP 1:



Does the patient require assistance with toilet transfers? Yes.



TRANSFERS: TOILET - STEP 2:



Does the patient require the assistance of a helper? Yes.



TRANSFERS: TOILET - STEP 3:



How much assistance does the patient require from the helper? Patient performs half or more of the tr
ansferring tasks



TRANSFERS: TOILET - STEP 4:



Does the patient need only incidental help such as contact guard or steadying during toilet transfer?
 Yes.



TRANSFERS: TOILET - SCORE:



4-MIN  



TRANSFERS: SHOWER:



Activity did not occur on this shift



TRANSFERS: SHOWER - SCORE:



0-UNK  



TRANSFERS: TUB:



Activity did not occur on this shift



TRANSFERS: TUB - SCORE:



0-UNK  



LOCOMOTION: WALK:



Activity did not occur on this shift



LOCOMOTION: WALK - SCORE:



0-UNK  



LOCOMOTION: WHEELCHAIR:



Activity did not occur on this shift



LOCOMOTION: WHEELCHAIR - SCORE:



0-UNK  



COMPREHENSION:



COMPREHENSION - STEP 1:



Does the patient require help to understand complex and abstract ideas (such as current events, finan
nora, discharge planning, medical issues, relationships, etc)? Yes.



COMPREHENSION - STEP 2:



Does the patient require help to understand questions or statements about basic needs or ideas (such 
as hunger, thirst, sleep, safety, daily schedule, room location, or discomfort) half or more of the t
saleem? No.



COMPREHENSION - STEP 3:



How often does the patient need help to understand directions and conversation about basic needs? 10%
 - 24% of the time



COMPREHENSION - SCORE:



4-MIN  



EXPRESSION



EXPRESSION - STEP 1:



Does the patient require help expressing complex and abstract ideas (such as current events, finances
, discharge planning, medical issues, relationships, etc)? Yes.



EXPRESSION - STEP 2:



Does the patient require help to express basic necessities or ideas (such as hunger, thirst, sleep, s
afety, daily schedule, room location, or discomfort) half or more of the time? No.



EXPRESSION - STEP 3:



How often does the patient need help to express directions and conversation about basic needs? 10-24%
 of the time



EXPRESSION - SCORE:



4-MIN  



SOCIAL INTERACTION:



SOCIAL INTERACTION - STEP 1:



Does the patient require a helper to interact with others in social and therapeutic situations? No.



SOCIAL INTERACTION - STEP 2:



Does the patient need extra time in social situations, OR does s/he interact with staff, other patien
ts, and family members ONLY in structured environments, OR does s/he require medication for social in
teraction? Yes, patient requires medication for social interaction



SOCIAL INTERACTION - SCORE:



6-ANUJA  



PROBLEM SOLVING:



PROBLEM SOLVING - STEP 1:



Does the patient need help to solve complex problems such as managing a checking account or confronti
ng interpersonal problems? Yes.



PROBLEM SOLVING - STEP 2:



Does the patient solve basic routine problems half or more of the time? Yes.



PROBLEM SOLVING - STEP 3:



How often does the patient need help to solve basic routine problems? 10%-24% of the time



PROBLEM SOLVING - SCORE:



4-MIN  



MEMORY:



MEMORY - STEP 1:



Does the patient need help to remember frequently encountered people, daily routines, and executing r
equests? Yes.



MEMORY - STEP 2:



How often does the patient need help to remember frequently encountered people, daily routines, and e
xecuting requests? 10% - 24% of the time



MEMORY - SCORE:



4-MIN  



SIGNATURE PANEL:



The following modified sections: Eating - Score, Grooming - Score, Dressing - Upper Body - Score, Rip
ssing - Lower Body - Score, Toileting - Score, Bladder Management - Score, Bowel Management - Score, 
Transfers: Bed, Chair, Wheelchair - Score, Transfers: Toilet - Score, Transfers: Shower - Score, Young
sfers: Tub - Score, Locomotion: Walk - Score, Locomotion: Wheelchair - Score, Comprehension - Score, 
Expression - Score, Social Interaction - Score, Problem Solving - Score, Memory - Score were [electro
nically] signed by Liliya Caceres CNA on Thu Jul 19 2018 03:25:20 GMT-0500 (Central Daylight Time)

## 2018-08-10 NOTE — R.DS
FACILITY



[facility]



MR#



[id number]



ACCT#



[id number]



NAME



ESPERANZA CONLEY



ADDRESS



[address] [facility]



ZIP



[address]



PHONE



[phone]



DATE OF BIRTH



1935



AGE



83



SSN#







GENDER



Female



DEXTERITY



Right-handed



MARITAL STATUS







RACE



White



ENCOUNTER PHYSICIAN



Dr. Moris Gonzalez M.D.



REFERRING DOCTOR



Tona Gill



REFERRING FACILITY



[facility]



DISCHARGE DIAGNOSIS:



- Orthopaedic Disorders 08 -  Femur (Shaft) Fracture (08.2)

Left Femoral Neck Fracture. 



DISCHARGE COMORBIDITIES:



- N/A

HTN

COPD

HIGH CHOLESTEROL

LUNG CANCER

ATRIAL FIBRILLATION

HYPOTHYROIDISM



DATE OF ADMISSION



07/05/2018 15:57 (CDT)



MEDICATION ALLERGIES:



CODEINE

CEPHALEXIN



ENVIRONMENTAL ALLERGIES:





None Known

- Substance Allergies

None Known

- Other Allergies

None Known



NURSING:



- Shower

allowing shower

- Skin

care per protocol



PRECAUTIONS:



- Weight Bearing Precaution

WBAT left LE

NWB Left UE

- Fall Precaution

Bed and chair alarm



ACTIVITIES







OOB only with supervision



THERAPIES:



- Occupational Therapy

Evaluate and Treat

- Physical Therapy

Evaluate and Treat



HISTORY OF PRESENT ILLNESS:



Pt. is a 82 yo Right-handed white female.Her impairment category is Orthopaedic Disorders 08 -  Femur
 (Shaft) Fracture (08.2).Pre-morbidly, Pt. was independent/mod-I in Sphincter Control, Communication,
 and Social Cognition; and she had good Sphincter Control.Currently, she has deficits of Balance, Fawn
f-Care, Locomotion, Endurance, Safety Awareness, and Transfers Control.Pt. is now referred to [facility] for acute in-patient rehabilitation in order to maximize patient's functio
nal independence in activities of daily living, strength, ROM, and mobility.- Rehab Goal

Patient has realistic goal of being discharged at assistance level 6-Brian to reside at Home with  Fam
russell/Relatives.

HOSPITAL COURSE:



On 07/09/2018 the following precautions were removed for the patient: Fall Precaution -  Bed and kailee
r alarm.

On 07/05/2018 the following precautions were added for the patient: Fall Precaution - Bed and chair a
larm.

On 07/06/2018 the following precautions were added for the patient: Fall Precaution -  Bed and chair 
alarm.



On 07/10/2018 the following precautions were added for the patient: Fall Precaution -  Bed and chair 
alarm.

The following precautions were removed for the patient: Fall Precaution - Bed and chair alarm, and Fa
ll Precaution -  Bed and chair alarm.

On 07/05/2018 the following precautions were added for the patient: Weight Bearing Precaution - NWB L
eft UE, and Weight Bearing Precaution - WBAT left LE.

On 07/06/2018 the following precautions were added for the patient: Weight Bearing Precaution -  WBAT
 left LE, and Weight Bearing Precaution -  NWB Left UE.

On 07/09/2018 the following precautions were removed for the patient: Weight Bearing Precaution -  WB
AT left LE, and Weight Bearing Precaution -  NWB Left UE.

On 07/10/2018 the following precautions were added for the patient: Weight Bearing Precaution -  WBAT
 left LE, and Weight Bearing Precaution -  NWB Left UE.

DIET - LIQUID TEXTURE:

On 07/05/2018 Pt was upgraded to Regular Diet - Liquid Texture.



DIET - SOLID TEXTURE:

On 07/05/2018 Pt was upgraded to Regular Diet - Solid Texture.



DIET TYPE:

On 07/05/2018 Pt was upgraded to Regular Diet Type.



FALL PRECAUTION:



TUBE FEED:

On 07/05/2018 Pt was changed to N/A Tube Feed.



WEIGHT BEARING PRECAUTION:



DISCHARGE PHYSICAL EXAM



- Gen

Alert and awake

Lying in bed

No apparent distress

Oriented to: person, time, and place

- Skin

No skin breakdown.



Normacephalic

- Eyes

No abnormalities

- ENMT

No abnormalities

- Neck

No abnormalities

- CVS

RRR

- Chest

Clear

- Abd

Soft

- GI

Non distended



Deferred

- 

No abnormalities

- Ext

Mild postoperative edema in the left lower extremity.

- MSK

4+/5 weakness in left lower extremity

- Neuro

4/5 strength left lower extremities.

- Psych

No abnormalities



FUNCTIONAL STATUS:



- Self-Care

A. Eating  5-sup  7-Ind  

B. Grooming  5-sup  7-Ind  

C. Bathing  5-sup  5-sup   

D. Dressing - Upper   4-Ben  5-sup  

E. Dressing - Lower   4-Ben  5-sup  

F. Toileting  4-Ben  5-sup  

- Sphincter Control

G: Bladder control   7-Ind  7-Ind   

H: Bowel control   7-Ind  7-Ind    

- Transfers Control

I. Bed/Chair/Wheelchair   2-maxA  5-sup  

J. Toilet  2-maxA  4-Ben  

K. Tub/Shower  0-ADNO  4-Ben  

- Locomotion

L. Walk/Wheelchair (C)   2-maxA  6-Brian  

L. Walk/Wheelchair (W)   2-maxA  4-Ben  

M. Stairs  0-ADNO  4-Ben  

- Communication

N. Comprehension (B)   7-Ind  7-Ind   

O. Expression (B)   7-Ind  7-Ind   

- Social Cognition

P. Social Interaction  7-Ind  7-Ind    

Q. Problem Solving  7-Ind  7-Ind    

R. Memory  7-Ind  7-Ind    

- Endurance

Fair

- Balance

Fair

- Safety Awareness

Fair



DISCHARGE INSTRUCTIONS:



- N/A

Lovenox 30 mg sq daily. 



DISCHARGE PLAN, FOLLOW UP CARE PROVISIONS:



- Estimated Length of Stay (days)

14. 



- Consensus on plan

Discharge plan has been discussed with primary caregiver. Patient/Family is in agreement with the aixa
n. Primary caregiver is in agreement with the plan. 



- Patient/Family Goals

Return home with assistance. 



- Planned Living Setting Upon Discharge

Home, to live with Family/Relatives. 



SIGNATURE PANEL:



Electronically signed by Dr. Moris Gonzalez M.D. on 08/10/2018 at 15:10 (CDT)